# Patient Record
Sex: MALE | Race: WHITE | Employment: FULL TIME | URBAN - METROPOLITAN AREA
[De-identification: names, ages, dates, MRNs, and addresses within clinical notes are randomized per-mention and may not be internally consistent; named-entity substitution may affect disease eponyms.]

---

## 2018-07-05 ENCOUNTER — HOSPITAL ENCOUNTER (EMERGENCY)
Age: 33
Discharge: HOME | End: 2018-07-05
Attending: EMERGENCY MEDICINE

## 2018-07-05 VITALS
RESPIRATION RATE: 16 BRPM | DIASTOLIC BLOOD PRESSURE: 112 MMHG | HEART RATE: 86 BPM | BODY MASS INDEX: 31.15 KG/M2 | TEMPERATURE: 98.1 F | OXYGEN SATURATION: 99 % | SYSTOLIC BLOOD PRESSURE: 182 MMHG | WEIGHT: 230 LBS | HEIGHT: 72 IN

## 2018-07-05 DIAGNOSIS — H11.89 CONJUNCTIVAL IRRITATION: Primary | ICD-10-CM

## 2018-07-05 PROCEDURE — 99282 EMERGENCY DEPT VISIT SF MDM: CPT

## 2018-07-05 RX ORDER — TOBRAMYCIN 3 MG/ML
1 SOLUTION/ DROPS OPHTHALMIC EVERY 6 HOURS
Qty: 1 BOTTLE | Refills: 0 | Status: SHIPPED | OUTPATIENT
Start: 2018-07-05 | End: 2018-07-15

## 2018-07-05 ASSESSMENT — ENCOUNTER SYMPTOMS
SINUS PRESSURE: 0
SORE THROAT: 0
ABDOMINAL PAIN: 0
EYE PAIN: 0
COUGH: 0
NAUSEA: 0
WHEEZING: 0
EYE REDNESS: 1
PERI-ORBITAL EDEMA: 1
DIARRHEA: 0
SHORTNESS OF BREATH: 0
VOMITING: 0
EYE ITCHING: 1
BACK PAIN: 0
EYE DISCHARGE: 0

## 2018-07-05 NOTE — ED PROVIDER NOTES
The history is provided by the patient. Eye Problem   Location:  Both eyes  Quality:  Aching  Severity:  Mild  Onset quality:  Gradual  Duration:  4 days  Chronicity:  New  Associated symptoms: itching, redness and swelling    Associated symptoms: no discharge, no headaches, no nausea, no vomiting and no weakness        Review of Systems   Constitutional: Negative for chills and fever. HENT: Negative for ear pain, sinus pressure and sore throat. Eyes: Positive for redness and itching. Negative for pain and discharge. Respiratory: Negative for cough, shortness of breath and wheezing. Cardiovascular: Negative for chest pain. Gastrointestinal: Negative for abdominal pain, diarrhea, nausea and vomiting. Genitourinary: Negative for dysuria and frequency. Musculoskeletal: Negative for arthralgias and back pain. Skin: Negative for rash and wound. Neurological: Negative for weakness and headaches. Hematological: Negative for adenopathy. All other systems reviewed and are negative. Physical Exam   Constitutional: He is oriented to person, place, and time. He appears well-developed and well-nourished. HENT:   Head: Normocephalic and atraumatic. Eyes: Pupils are equal, round, and reactive to light. Right conjunctiva is injected. Left conjunctiva is injected. Neck: Normal range of motion. Neck supple. Cardiovascular: Normal rate, regular rhythm and normal heart sounds. No murmur heard. Pulmonary/Chest: Effort normal and breath sounds normal. No respiratory distress. He has no wheezes. He has no rales. Abdominal: Soft. Bowel sounds are normal. There is no tenderness. There is no rebound and no guarding. Musculoskeletal: He exhibits no edema. Neurological: He is alert and oriented to person, place, and time. No cranial nerve deficit. Coordination normal.   Skin: Skin is warm and dry. Nursing note and vitals reviewed.       Procedures    MDM evidence of an acute process requiring hospitalization or inpatient management. They have remained hemodynamically stable throughout their entire ED visit and are stable for discharge with outpatient follow-up. The plan has been discussed in detail and they are aware of the specific conditions for emergent return, as well as the importance of follow-up. New Prescriptions    TOBRAMYCIN (TOBREX) 0.3 % OPHTHALMIC SOLUTION    Place 1 drop into both eyes every 6 hours for 10 days       Diagnosis:  1. Conjunctival irritation        Disposition:  Patient's disposition: Discharge to home  Patient's condition is stable.          Octavio Manning MD  07/05/18 1125

## 2018-11-20 ENCOUNTER — HOSPITAL ENCOUNTER (EMERGENCY)
Age: 33
Discharge: HOME | End: 2018-11-20
Attending: EMERGENCY MEDICINE
Payer: COMMERCIAL

## 2018-11-20 VITALS
BODY MASS INDEX: 35.21 KG/M2 | SYSTOLIC BLOOD PRESSURE: 155 MMHG | DIASTOLIC BLOOD PRESSURE: 90 MMHG | OXYGEN SATURATION: 98 % | TEMPERATURE: 99.2 F | RESPIRATION RATE: 16 BRPM | HEIGHT: 72 IN | WEIGHT: 260 LBS | HEART RATE: 90 BPM

## 2018-11-20 DIAGNOSIS — Z48.89 ENCOUNTER FOR POSTOPERATIVE WOUND CHECK: Primary | ICD-10-CM

## 2018-11-20 PROCEDURE — G0381 LEV 2 HOSP TYPE B ED VISIT: HCPCS

## 2018-11-20 PROCEDURE — 99282 EMERGENCY DEPT VISIT SF MDM: CPT

## 2018-11-20 ASSESSMENT — ENCOUNTER SYMPTOMS
SHORTNESS OF BREATH: 0
VOMITING: 0
SINUS PRESSURE: 0
NAUSEA: 0
CONSTIPATION: 0
RHINORRHEA: 0
ABDOMINAL PAIN: 0
SORE THROAT: 0
SINUS PAIN: 0
BACK PAIN: 0
DIARRHEA: 0
WHEEZING: 0
COUGH: 0

## 2018-11-20 ASSESSMENT — PAIN DESCRIPTION - FREQUENCY: FREQUENCY: CONTINUOUS

## 2018-11-20 ASSESSMENT — PAIN DESCRIPTION - PAIN TYPE: TYPE: ACUTE PAIN

## 2018-11-20 ASSESSMENT — PAIN SCALES - GENERAL: PAINLEVEL_OUTOF10: 7

## 2018-11-20 ASSESSMENT — PAIN DESCRIPTION - DESCRIPTORS: DESCRIPTORS: ACHING

## 2018-11-20 ASSESSMENT — PAIN DESCRIPTION - ORIENTATION: ORIENTATION: LEFT

## 2018-11-20 ASSESSMENT — PAIN DESCRIPTION - LOCATION: LOCATION: ARM

## 2018-11-20 ASSESSMENT — PAIN DESCRIPTION - PROGRESSION: CLINICAL_PROGRESSION: NOT CHANGED

## 2018-11-20 ASSESSMENT — PAIN DESCRIPTION - ONSET: ONSET: ON-GOING

## 2018-11-20 NOTE — ED PROVIDER NOTES
Patient is a 44-year-old male who presents with chief complaint of wound check. Patient states that he was just discharged from the hospital 5 days ago after surgical debridement of an abscess. Patient does have a left antecubital fossa wound is not having any drainage, swelling, or erythema. The patient states that he does not have any money and ran out oh dressings for his wound. The patient denies any fevers or chills. He is on oral antibiotics that were prescribed by infectious disease. He is following with him closely as an outpatient. The patient denies any history of IV drug abuse. The history is provided by the patient. No  was used. Review of Systems   Constitutional: Negative for chills, fatigue and fever. HENT: Negative for congestion, rhinorrhea, sinus pain, sinus pressure, sneezing and sore throat. Respiratory: Negative for cough, shortness of breath and wheezing. Cardiovascular: Negative for chest pain and palpitations. Gastrointestinal: Negative for abdominal pain, constipation, diarrhea, nausea and vomiting. Genitourinary: Negative for dysuria, frequency and hematuria. Musculoskeletal: Negative for back pain, neck pain and neck stiffness. Skin: Positive for wound. Negative for rash. Neurological: Negative for dizziness, light-headedness and headaches. Psychiatric/Behavioral: Negative for agitation, behavioral problems and confusion. Physical Exam   Constitutional: He is oriented to person, place, and time. He appears well-developed and well-nourished. No distress. HENT:   Head: Normocephalic and atraumatic. Mouth/Throat: Oropharynx is clear and moist.   Eyes: Conjunctivae are normal. Right eye exhibits no discharge. Left eye exhibits no discharge. No scleral icterus. Neck: Normal range of motion. Neck supple. Cardiovascular: Normal rate and regular rhythm.     Pulmonary/Chest: Effort normal and breath sounds normal. No respiratory

## 2019-09-23 ENCOUNTER — HOSPITAL ENCOUNTER (EMERGENCY)
Age: 34
Discharge: HOME | End: 2019-09-23
Payer: COMMERCIAL

## 2019-09-23 VITALS
HEART RATE: 96 BPM | TEMPERATURE: 98.6 F | WEIGHT: 300 LBS | RESPIRATION RATE: 16 BRPM | OXYGEN SATURATION: 98 % | BODY MASS INDEX: 40.63 KG/M2 | HEIGHT: 72 IN | SYSTOLIC BLOOD PRESSURE: 189 MMHG | DIASTOLIC BLOOD PRESSURE: 96 MMHG

## 2019-09-23 DIAGNOSIS — I10 HYPERTENSION, UNSPECIFIED TYPE: ICD-10-CM

## 2019-09-23 DIAGNOSIS — H66.90 ACUTE OTITIS MEDIA, UNSPECIFIED OTITIS MEDIA TYPE: Primary | ICD-10-CM

## 2019-09-23 PROCEDURE — 99282 EMERGENCY DEPT VISIT SF MDM: CPT

## 2019-09-23 PROCEDURE — 6370000000 HC RX 637 (ALT 250 FOR IP): Performed by: NURSE PRACTITIONER

## 2019-09-23 RX ORDER — AMOXICILLIN 500 MG/1
500 CAPSULE ORAL 2 TIMES DAILY
Qty: 14 CAPSULE | Refills: 0 | Status: SHIPPED | OUTPATIENT
Start: 2019-09-23 | End: 2019-09-30

## 2019-09-23 RX ORDER — LORATADINE 10 MG/1
10 TABLET ORAL DAILY
Qty: 30 TABLET | Refills: 0 | Status: SHIPPED | OUTPATIENT
Start: 2019-09-23 | End: 2019-10-23

## 2019-09-23 RX ORDER — AMOXICILLIN 250 MG/1
500 CAPSULE ORAL ONCE
Status: COMPLETED | OUTPATIENT
Start: 2019-09-23 | End: 2019-09-23

## 2019-09-23 RX ADMIN — AMOXICILLIN 500 MG: 250 CAPSULE ORAL at 19:40

## 2019-09-23 ASSESSMENT — PAIN DESCRIPTION - PAIN TYPE: TYPE: ACUTE PAIN

## 2019-09-23 ASSESSMENT — PAIN SCALES - GENERAL: PAINLEVEL_OUTOF10: 7

## 2019-09-23 ASSESSMENT — PAIN DESCRIPTION - LOCATION: LOCATION: EAR

## 2019-09-23 ASSESSMENT — PAIN DESCRIPTION - ORIENTATION: ORIENTATION: LEFT;RIGHT

## 2019-09-24 ENCOUNTER — TELEPHONE (OUTPATIENT)
Dept: ADMINISTRATIVE | Age: 34
End: 2019-09-24

## 2019-09-24 NOTE — TELEPHONE ENCOUNTER
Patient was seen in the ED yesterday for Acute otitis media. He is a community corrections resident and is not able to leave the area. He is referred to follow up with the [address] [facility] but due to his restrictions he must see someone in [facility]. I did not have any openings and was not able to reach anyone in the office. Please call him back at [facility]. The call back number is [phone]. Thank you.

## 2019-09-24 NOTE — ED PROVIDER NOTES
99 16 94 % 6' (1.829 m) 300 lb (136.1 kg)      Oxygen Saturation Interpretation: Normal.    Constitutional:  Alert, development consistent with age. Ears:  External Ears: normal.                 TM's & External Canals:  abnormal external canal left ear - erythematous and abnormal TM left ear - dull. Nose:   There is no abnormalities present  Throat:  Pharynx without injection, exudate, or tonsillar hypertrophy. Airway patient. Neck:  Normal ROM. Supple. Respiratory:  Clear to auscultation and breath sounds equal.    CV: Regular rate and rhythm, normal heart sounds, without pathological murmurs, ectopy, gallops, or rubs. Skin:  No rashes, erythema present, unless noted elsewhere. Lymphatic: No lymphangitis or adenopathy noted. Neurological:  Oriented. Motor functions intact. Lab / Imaging Results   (All laboratory and radiology results have been personally reviewed by myself)  Labs:  No results found for this visit on 09/23/19. Imaging: All Radiology results interpreted by Radiologist unless otherwise noted. No orders to display     ED Course / Medical Decision Making     Medications   amoxicillin (AMOXIL) capsule 500 mg (500 mg Oral Given 9/23/19 1940)        Consult(s):   None    Procedure(s):   none    MDM:   Joy Zamora is a 36year old male who presents the emergency department for complaint of left earache for the past 3 weeks. He reports that his PCP told him that he had fluid behind the ear several days ago. Reports that he is under the care of ENT and has had to have tubes placed in the past.  Nontoxic. Is presently residing at [facility] and reports for that reason he has had difficulty getting follow-up with his ENT. Left TM is dull with noted erythema. Has not been on any recent antibiotics. Prescribed amoxicillin. Instructed to follow-up with ENT and advised to return for any new, changing, worsening symptoms or concerns. His blood pressure was noted to be elevated while he was in the ED. was instructed to follow-up with his PCP for blood pressure reevaluation/check. At this time the patient is without objective evidence of an acute process requiring hospitalization or inpatient management. They have remained hemodynamically stable throughout their entire ED visit and are stable for discharge with outpatient follow-up. The plan has been discussed in detail and they are aware of the specific conditions for emergent return, as well as the importance of follow-up. Counseling: The emergency provider has spoken with the patient and discussed todays results, in addition to providing specific details for the plan of care and counseling regarding the diagnosis and prognosis. Questions are answered at this time and they are agreeable with the plan. Assessment      1. Acute otitis media, unspecified otitis media type    2. Hypertension, unspecified type      Plan   Discharge to home  Patient condition is good    New Medications     Discharge Medication List as of 9/23/2019  7:44 PM      START taking these medications    Details   amoxicillin (AMOXIL) 500 MG capsule Take 1 capsule by mouth 2 times daily for 7 days, Disp-14 capsule, R-0Print      loratadine (CLARITIN) 10 MG tablet Take 1 tablet by mouth daily, Disp-30 tablet, R-0Print           Electronically signed by FADIA Tyler CNP   DD: 9/23/19  **This report was transcribed using voice recognition software. Every effort was made to ensure accuracy; however, inadvertent computerized transcription errors may be present.   END OF ED PROVIDER NOTE     FADIA Tyler CNP  09/23/19 5829

## 2021-01-13 ENCOUNTER — APPOINTMENT (OUTPATIENT)
Dept: GENERAL RADIOLOGY | Age: 36
End: 2021-01-13
Payer: MEDICAID

## 2021-01-13 ENCOUNTER — APPOINTMENT (OUTPATIENT)
Dept: ULTRASOUND IMAGING | Age: 36
End: 2021-01-13
Payer: MEDICAID

## 2021-01-13 ENCOUNTER — HOSPITAL ENCOUNTER (EMERGENCY)
Age: 36
Discharge: HOME | End: 2021-01-13
Attending: EMERGENCY MEDICINE
Payer: MEDICAID

## 2021-01-13 VITALS
WEIGHT: 315 LBS | DIASTOLIC BLOOD PRESSURE: 79 MMHG | TEMPERATURE: 97.8 F | BODY MASS INDEX: 44.1 KG/M2 | HEART RATE: 86 BPM | HEIGHT: 71 IN | SYSTOLIC BLOOD PRESSURE: 118 MMHG | RESPIRATION RATE: 16 BRPM | OXYGEN SATURATION: 95 %

## 2021-01-13 DIAGNOSIS — R11.2 NON-INTRACTABLE VOMITING WITH NAUSEA, UNSPECIFIED VOMITING TYPE: ICD-10-CM

## 2021-01-13 DIAGNOSIS — E86.0 DEHYDRATION: Primary | ICD-10-CM

## 2021-01-13 LAB
ALBUMIN SERPL-MCNC: 4.3 G/DL (ref 3.5–5.2)
ALP BLD-CCNC: 107 U/L (ref 40–129)
ALT SERPL-CCNC: 103 U/L (ref 0–40)
AMPHETAMINE SCREEN, URINE: NOT DETECTED
ANION GAP SERPL CALCULATED.3IONS-SCNC: 13 MMOL/L (ref 7–16)
AST SERPL-CCNC: 49 U/L (ref 0–39)
BARBITURATE SCREEN URINE: NOT DETECTED
BASOPHILS ABSOLUTE: 0.03 E9/L (ref 0–0.2)
BASOPHILS RELATIVE PERCENT: 0.3 % (ref 0–2)
BENZODIAZEPINE SCREEN, URINE: NOT DETECTED
BILIRUB SERPL-MCNC: 0.4 MG/DL (ref 0–1.2)
BILIRUBIN URINE: NEGATIVE
BLOOD, URINE: NEGATIVE
BUN BLDV-MCNC: 23 MG/DL (ref 6–20)
CALCIUM SERPL-MCNC: 9.7 MG/DL (ref 8.6–10.2)
CANNABINOID SCREEN URINE: POSITIVE
CHLORIDE BLD-SCNC: 98 MMOL/L (ref 98–107)
CLARITY: CLEAR
CO2: 25 MMOL/L (ref 22–29)
COCAINE METABOLITE SCREEN URINE: NOT DETECTED
COLOR: YELLOW
CREAT SERPL-MCNC: 1.3 MG/DL (ref 0.7–1.2)
EKG ATRIAL RATE: 69 BPM
EKG P AXIS: 21 DEGREES
EKG P-R INTERVAL: 162 MS
EKG Q-T INTERVAL: 384 MS
EKG QRS DURATION: 86 MS
EKG QTC CALCULATION (BAZETT): 411 MS
EKG R AXIS: 64 DEGREES
EKG T AXIS: 29 DEGREES
EKG VENTRICULAR RATE: 69 BPM
EOSINOPHILS ABSOLUTE: 0.27 E9/L (ref 0.05–0.5)
EOSINOPHILS RELATIVE PERCENT: 3 % (ref 0–6)
FENTANYL SCREEN, URINE: NOT DETECTED
GFR AFRICAN AMERICAN: >60
GFR NON-AFRICAN AMERICAN: >60 ML/MIN/1.73
GLUCOSE BLD-MCNC: 105 MG/DL (ref 74–99)
GLUCOSE URINE: NEGATIVE MG/DL
HCT VFR BLD CALC: 50.2 % (ref 37–54)
HEMOGLOBIN: 16.6 G/DL (ref 12.5–16.5)
IMMATURE GRANULOCYTES #: 0.04 E9/L
IMMATURE GRANULOCYTES %: 0.5 % (ref 0–5)
KETONES, URINE: NEGATIVE MG/DL
LEUKOCYTE ESTERASE, URINE: NEGATIVE
LIPASE: 20 U/L (ref 13–60)
LYMPHOCYTES ABSOLUTE: 2.71 E9/L (ref 1.5–4)
LYMPHOCYTES RELATIVE PERCENT: 30.6 % (ref 20–42)
Lab: ABNORMAL
MCH RBC QN AUTO: 28.7 PG (ref 26–35)
MCHC RBC AUTO-ENTMCNC: 33.1 % (ref 32–34.5)
MCV RBC AUTO: 86.7 FL (ref 80–99.9)
METHADONE SCREEN, URINE: NOT DETECTED
MONOCYTES ABSOLUTE: 0.82 E9/L (ref 0.1–0.95)
MONOCYTES RELATIVE PERCENT: 9.2 % (ref 2–12)
NEUTROPHILS ABSOLUTE: 5 E9/L (ref 1.8–7.3)
NEUTROPHILS RELATIVE PERCENT: 56.4 % (ref 43–80)
NITRITE, URINE: NEGATIVE
OPIATE SCREEN URINE: NOT DETECTED
OXYCODONE URINE: NOT DETECTED
PDW BLD-RTO: 12.4 FL (ref 11.5–15)
PH UA: 6 (ref 5–9)
PHENCYCLIDINE SCREEN URINE: NOT DETECTED
PLATELET # BLD: 297 E9/L (ref 130–450)
PMV BLD AUTO: 8.8 FL (ref 7–12)
POTASSIUM REFLEX MAGNESIUM: 4.5 MMOL/L (ref 3.5–5)
PROTEIN UA: NEGATIVE MG/DL
RBC # BLD: 5.79 E12/L (ref 3.8–5.8)
SODIUM BLD-SCNC: 136 MMOL/L (ref 132–146)
SPECIFIC GRAVITY UA: 1.02 (ref 1–1.03)
TOTAL PROTEIN: 8 G/DL (ref 6.4–8.3)
TROPONIN: <0.01 NG/ML (ref 0–0.03)
UROBILINOGEN, URINE: 0.2 E.U./DL
WBC # BLD: 8.9 E9/L (ref 4.5–11.5)

## 2021-01-13 PROCEDURE — 84484 ASSAY OF TROPONIN QUANT: CPT

## 2021-01-13 PROCEDURE — 81003 URINALYSIS AUTO W/O SCOPE: CPT

## 2021-01-13 PROCEDURE — 71045 X-RAY EXAM CHEST 1 VIEW: CPT

## 2021-01-13 PROCEDURE — 85025 COMPLETE CBC W/AUTO DIFF WBC: CPT

## 2021-01-13 PROCEDURE — 36415 COLL VENOUS BLD VENIPUNCTURE: CPT

## 2021-01-13 PROCEDURE — 83690 ASSAY OF LIPASE: CPT

## 2021-01-13 PROCEDURE — 76705 ECHO EXAM OF ABDOMEN: CPT

## 2021-01-13 PROCEDURE — 99283 EMERGENCY DEPT VISIT LOW MDM: CPT

## 2021-01-13 PROCEDURE — 80053 COMPREHEN METABOLIC PANEL: CPT

## 2021-01-13 PROCEDURE — 96361 HYDRATE IV INFUSION ADD-ON: CPT

## 2021-01-13 PROCEDURE — 80307 DRUG TEST PRSMV CHEM ANLYZR: CPT

## 2021-01-13 PROCEDURE — 2580000003 HC RX 258: Performed by: EMERGENCY MEDICINE

## 2021-01-13 PROCEDURE — 6370000000 HC RX 637 (ALT 250 FOR IP): Performed by: STUDENT IN AN ORGANIZED HEALTH CARE EDUCATION/TRAINING PROGRAM

## 2021-01-13 PROCEDURE — 96374 THER/PROPH/DIAG INJ IV PUSH: CPT

## 2021-01-13 PROCEDURE — 93005 ELECTROCARDIOGRAM TRACING: CPT | Performed by: STUDENT IN AN ORGANIZED HEALTH CARE EDUCATION/TRAINING PROGRAM

## 2021-01-13 PROCEDURE — 6360000002 HC RX W HCPCS: Performed by: STUDENT IN AN ORGANIZED HEALTH CARE EDUCATION/TRAINING PROGRAM

## 2021-01-13 RX ORDER — ONDANSETRON 4 MG/1
4 TABLET, ORALLY DISINTEGRATING ORAL EVERY 12 HOURS PRN
Qty: 4 TABLET | Refills: 0 | Status: SHIPPED | OUTPATIENT
Start: 2021-01-13 | End: 2021-01-13 | Stop reason: SDUPTHER

## 2021-01-13 RX ORDER — ONDANSETRON 2 MG/ML
4 INJECTION INTRAMUSCULAR; INTRAVENOUS ONCE
Status: COMPLETED | OUTPATIENT
Start: 2021-01-13 | End: 2021-01-13

## 2021-01-13 RX ORDER — 0.9 % SODIUM CHLORIDE 0.9 %
1000 INTRAVENOUS SOLUTION INTRAVENOUS ONCE
Status: COMPLETED | OUTPATIENT
Start: 2021-01-13 | End: 2021-01-13

## 2021-01-13 RX ORDER — ONDANSETRON 4 MG/1
4 TABLET, ORALLY DISINTEGRATING ORAL EVERY 12 HOURS PRN
Qty: 4 TABLET | Refills: 0 | Status: SHIPPED | OUTPATIENT
Start: 2021-01-13 | End: 2021-01-15

## 2021-01-13 RX ADMIN — LIDOCAINE HYDROCHLORIDE: 20 SOLUTION ORAL; TOPICAL at 13:35

## 2021-01-13 RX ADMIN — SODIUM CHLORIDE 1000 ML: 9 INJECTION, SOLUTION INTRAVENOUS at 15:43

## 2021-01-13 RX ADMIN — ONDANSETRON 4 MG: 2 INJECTION INTRAMUSCULAR; INTRAVENOUS at 13:37

## 2021-01-13 RX ADMIN — SODIUM CHLORIDE 1000 ML: 9 INJECTION, SOLUTION INTRAVENOUS at 13:36

## 2021-01-13 ASSESSMENT — PAIN DESCRIPTION - LOCATION: LOCATION: ABDOMEN

## 2021-01-13 ASSESSMENT — ENCOUNTER SYMPTOMS
WHEEZING: 0
COLOR CHANGE: 0
CONSTIPATION: 0
BACK PAIN: 0
SHORTNESS OF BREATH: 0
NAUSEA: 1
RECTAL PAIN: 0
VOICE CHANGE: 0
COUGH: 0
ABDOMINAL PAIN: 1
VOMITING: 1
DIARRHEA: 0
TROUBLE SWALLOWING: 0

## 2021-01-13 ASSESSMENT — PAIN DESCRIPTION - PAIN TYPE: TYPE: ACUTE PAIN;CHRONIC PAIN

## 2021-01-13 NOTE — ED PROVIDER NOTES
[address]      Pt Name: Shaheen Santos  MRN: [id number]  [place] 1985  Date of evaluation: 1/13/2021      CHIEF COMPLAINT       Chief Complaint   Patient presents with    Abdominal Pain    Nausea    Emesis     x several weeks, unable to keep liquids or solids down.  Fatigue    Dizziness        HPI  Shaheen Santos is a 30 y.o. male with pertinent past medical history of GERD without esophagitis, presents with complaints of nausea, emesis, fatigue, dizziness, and abdominal pain for 2 weeks. Patient describes his abdominal pain is epigastric, gnawing, aching, moderate in severity. Worsened with eating. No alleviating factors. Admits to nausea, vomiting, fatigue, and dizziness. Patient taken Zofran with only minor relief of his symptoms. Patient also admits to smoking 1-1/2 g of medical grade marijuana daily. States that it is not helped his abdominal pain or his nausea. Denies any fevers, chills, chest pain, shortness of breath, flank pain, dysuria, hematuria, diarrhea, constipation, new rashes or sores. Except as noted above the remainder of the review of systems was reviewed and negative. Review of Systems   Constitutional: Positive for appetite change. Negative for chills, diaphoresis, fatigue, fever and unexpected weight change. HENT: Negative for trouble swallowing and voice change. Eyes: Negative for visual disturbance. Respiratory: Negative for cough, shortness of breath and wheezing. Cardiovascular: Negative for chest pain. Gastrointestinal: Positive for abdominal pain, nausea and vomiting. Negative for constipation, diarrhea and rectal pain. Endocrine: Negative for polyphagia and polyuria. Genitourinary: Negative for dysuria and frequency. Musculoskeletal: Negative for arthralgias and back pain. Skin: Negative for color change, pallor, rash and wound.    Neurological: Negative for for \"weeks. \"  Vitals within normal limits. On physical exam patient is in no acute distress, speaking full sentences, alert and oriented x3. Lungs are clear to auscultation bilaterally. No wheeze rales rhonchi appreciated. Normal S1-S2. Abnormality at the epigastrium. Mild right upper quadrant abdominal tenderness. Negative Olivas sign. No tenderness McBurney's point. Positive bowel sounds. No bilateral leg edema. Diagnostic labs remarkable. Electrolytes within normal limits. Patient has a mildly elevated creatinine of 1.3. Right upper quadrant ultrasound negative for acute process. Possibly elevated due to dehydration. Patient was given IV fluids in the department and given Zofran. He was p.o. challenged. Tolerated it well. Patient's diagnosis of his abdominal pain epigastric. And nonintractable nausea and vomiting. I spoke with the patient to try to come to the decreased amount of marijuana he smokes. And possibly that this could be the cause of his symptoms. Patient has a follow-up with a GI specialist on 24th of this month. He is agreeable plan for discharge. Amount and/or Complexity of Data Reviewed  Decide to obtain previous medical records or to obtain history from someone other than the patient: yes           Patient is awake alert, hemodynamic stable, afebrile and in no respiratory distress. Discussed with patient plan for close outpatient follow-up with the patient's PCP as well as return precautions and the patient understands and agrees to the plan. ED Course as of Jan 14 0255 Wed Jan 13, 2021   1314 On chart review patient does not have any medications on file.     [JV]   1316 HTN on lisinopril, Hcl, clonazapam, zolotipam, escitalopram, hydroyzine    New physician used to see Dr. Miranda Corley      [JV]   [place]:     I have personally performed and/or participated in the history, exam, medical decision making, and rhinitis, Chronic back pain, and Depression. Past Surgical History:  has a past surgical history that includes Abscess Drainage. Social History:  reports that he has quit smoking. His smoking use included cigarettes. He has a 30.00 pack-year smoking history. He has never used smokeless tobacco. He reports current alcohol use. He reports that he does not use drugs. Family History: family history is not on file. The patients home medications have been reviewed. Allergies: Patient has no known allergies.     -------------------------------------------------- RESULTS -------------------------------------------------  Labs:  Results for orders placed or performed during the hospital encounter of 01/13/21   CBC Auto Differential   Result Value Ref Range    WBC 8.9 4.5 - 11.5 E9/L    RBC 5.79 3.80 - 5.80 E12/L    Hemoglobin 16.6 (H) 12.5 - 16.5 g/dL    Hematocrit 50.2 37.0 - 54.0 %    MCV 86.7 80.0 - 99.9 fL    MCH 28.7 26.0 - 35.0 pg    MCHC 33.1 32.0 - 34.5 %    RDW 12.4 11.5 - 15.0 fL    Platelets 796 624 - 810 E9/L    MPV 8.8 7.0 - 12.0 fL    Neutrophils % 56.4 43.0 - 80.0 %    Immature Granulocytes % 0.5 0.0 - 5.0 %    Lymphocytes % 30.6 20.0 - 42.0 %    Monocytes % 9.2 2.0 - 12.0 %    Eosinophils % 3.0 0.0 - 6.0 %    Basophils % 0.3 0.0 - 2.0 %    Neutrophils Absolute 5.00 1.80 - 7.30 E9/L    Immature Granulocytes # 0.04 E9/L    Lymphocytes Absolute 2.71 1.50 - 4.00 E9/L    Monocytes Absolute 0.82 0.10 - 0.95 E9/L    Eosinophils Absolute 0.27 0.05 - 0.50 E9/L    Basophils Absolute 0.03 0.00 - 0.20 E9/L   Comprehensive Metabolic Panel w/ Reflex to MG   Result Value Ref Range    Sodium 136 132 - 146 mmol/L    Potassium reflex Magnesium 4.5 3.5 - 5.0 mmol/L    Chloride 98 98 - 107 mmol/L    CO2 25 22 - 29 mmol/L    Anion Gap 13 7 - 16 mmol/L    Glucose 105 (H) 74 - 99 mg/dL    BUN 23 (H) 6 - 20 mg/dL    CREATININE 1.3 (H) 0.7 - 1.2 mg/dL    GFR Non-African American >60 >=60 mL/min/1.73    GFR African American >60     Calcium 9.7 8.6 - 10.2 mg/dL    Total Protein 8.0 6.4 - 8.3 g/dL    Alb 4.3 3.5 - 5.2 g/dL    Total Bilirubin 0.4 0.0 - 1.2 mg/dL    Alkaline Phosphatase 107 40 - 129 U/L     (H) 0 - 40 U/L    AST 49 (H) 0 - 39 U/L   Lipase   Result Value Ref Range    Lipase 20 13 - 60 U/L   Troponin   Result Value Ref Range    Troponin <0.01 0.00 - 0.03 ng/mL   Urinalysis   Result Value Ref Range    Color, UA Yellow Straw/Yellow    Clarity, UA Clear Clear    Glucose, Ur Negative Negative mg/dL    Bilirubin Urine Negative Negative    Ketones, Urine Negative Negative mg/dL    Specific Gravity, UA 1.020 1.005 - 1.030    Blood, Urine Negative Negative    pH, UA 6.0 5.0 - 9.0    Protein, UA Negative Negative mg/dL    Urobilinogen, Urine 0.2 <2.0 E.U./dL    Nitrite, Urine Negative Negative    Leukocyte Esterase, Urine Negative Negative   Urine Drug Screen   Result Value Ref Range    Amphetamine Screen, Urine NOT DETECTED Negative <1000 ng/mL    Barbiturate Screen, Ur NOT DETECTED Negative < 200 ng/mL    Benzodiazepine Screen, Urine NOT DETECTED Negative < 200 ng/mL    Cannabinoid Scrn, Ur POSITIVE (A) Negative < 50ng/mL    Cocaine Metabolite Screen, Urine NOT DETECTED Negative < 300 ng/mL    Opiate Scrn, Ur NOT DETECTED Negative < 300ng/mL    PCP Screen, Urine NOT DETECTED Negative < 25 ng/mL    Methadone Screen, Urine NOT DETECTED Negative <300 ng/mL    Oxycodone Urine NOT DETECTED Negative <100 ng/mL    FENTANYL SCREEN, URINE NOT DETECTED Negative <1 ng/mL    Drug Screen Comment: see below    EKG 12 Lead   Result Value Ref Range    Ventricular Rate 69 BPM    Atrial Rate 69 BPM    P-R Interval 162 ms    QRS Duration 86 ms    Q-T Interval 384 ms    QTc Calculation (Bazett) 411 ms    P Axis 21 degrees    R Axis 64 degrees    T Axis 29 degrees     EKG read by me. Heart rate 69. Normal sinus rhythm. Normal axis deviation. No QTC prolongation. No ST elevations or depressions.   Stable as compared to previous EKG on 1/10/2017. Radiology:  XR CHEST 1 VIEW   Final Result   No pneumonia or pleural effusion. US ABDOMEN LIMITED   Final Result   Unremarkable right upper quadrant ultrasound. Please note that there is limited visualization of the pancreas. ------------------------- NURSING NOTES AND VITALS REVIEWED ---------------------------  Date / Time Roomed:  1/13/2021 12:57 PM  ED Bed Assignment:  23/23    The nursing notes within the ED encounter and vital signs as below have been reviewed. /79   Pulse 86   Temp 97.8 °F (36.6 °C) (Oral)   Resp 16   Ht 5' 11\" (1.803 m)   Wt (!) 343 lb 1 oz (155.6 kg)   SpO2 95%   BMI 47.85 kg/m²   Oxygen Saturation Interpretation: normal      ------------------------------------------ PROGRESS NOTES ------------------------------------------    I have spoken with the patient and discussed todays results, in addition to providing specific details for the plan of care and counseling regarding the diagnosis and prognosis. Their questions are answered at this time and they are agreeable with the plan. I discussed at length with them reasons for immediate return here for re evaluation. They will followup with their PCP by calling their office tomorrow. --------------------------------- ADDITIONAL PROVIDER NOTES ---------------------------------  At this time the patient is without objective evidence of an acute process requiring hospitalization or inpatient management. They have remained hemodynamically stable throughout their entire ED visit and are stable for discharge with outpatient follow-up. The plan has been discussed in detail and they are aware of the specific conditions for emergent return, as well as the importance of follow-up.       Discharge Medication List as of 1/13/2021  4:20 PM      START taking these medications    Details   ondansetron (ZOFRAN ODT) 4 MG disintegrating tablet Take 1 tablet by mouth every 12 hours as needed for Nausea

## 2021-01-19 ENCOUNTER — OFFICE VISIT (OUTPATIENT)
Dept: FAMILY MEDICINE CLINIC | Age: 36
End: 2021-01-19
Payer: MEDICAID

## 2021-01-19 VITALS
RESPIRATION RATE: 16 BRPM | SYSTOLIC BLOOD PRESSURE: 92 MMHG | TEMPERATURE: 97.8 F | WEIGHT: 315 LBS | HEART RATE: 81 BPM | HEIGHT: 71 IN | OXYGEN SATURATION: 95 % | BODY MASS INDEX: 44.1 KG/M2 | DIASTOLIC BLOOD PRESSURE: 59 MMHG

## 2021-01-19 DIAGNOSIS — R10.11 RIGHT UPPER QUADRANT ABDOMINAL PAIN: Primary | ICD-10-CM

## 2021-01-19 DIAGNOSIS — Z72.0 TOBACCO ABUSE: ICD-10-CM

## 2021-01-19 DIAGNOSIS — Z13.31 POSITIVE DEPRESSION SCREENING: ICD-10-CM

## 2021-01-19 DIAGNOSIS — E66.01 CLASS 3 SEVERE OBESITY DUE TO EXCESS CALORIES WITH BODY MASS INDEX (BMI) OF 45.0 TO 49.9 IN ADULT, UNSPECIFIED WHETHER SERIOUS COMORBIDITY PRESENT (HCC): ICD-10-CM

## 2021-01-19 DIAGNOSIS — I10 ESSENTIAL HYPERTENSION: ICD-10-CM

## 2021-01-19 PROCEDURE — G8417 CALC BMI ABV UP PARAM F/U: HCPCS | Performed by: INTERNAL MEDICINE

## 2021-01-19 PROCEDURE — 99213 OFFICE O/P EST LOW 20 MIN: CPT | Performed by: INTERNAL MEDICINE

## 2021-01-19 PROCEDURE — G8427 DOCREV CUR MEDS BY ELIG CLIN: HCPCS | Performed by: INTERNAL MEDICINE

## 2021-01-19 PROCEDURE — G8431 POS CLIN DEPRES SCRN F/U DOC: HCPCS | Performed by: INTERNAL MEDICINE

## 2021-01-19 PROCEDURE — G8484 FLU IMMUNIZE NO ADMIN: HCPCS | Performed by: INTERNAL MEDICINE

## 2021-01-19 PROCEDURE — 4004F PT TOBACCO SCREEN RCVD TLK: CPT | Performed by: INTERNAL MEDICINE

## 2021-01-19 RX ORDER — TESTOSTERONE 20.25 MG/1.25G
GEL TOPICAL
COMMUNITY
Start: 2021-01-15 | End: 2021-09-15

## 2021-01-19 RX ORDER — ESCITALOPRAM OXALATE 20 MG/1
TABLET ORAL
COMMUNITY
Start: 2021-01-05

## 2021-01-19 RX ORDER — AMLODIPINE BESYLATE 10 MG/1
TABLET ORAL
COMMUNITY
Start: 2020-12-01 | End: 2021-01-19 | Stop reason: SDUPTHER

## 2021-01-19 RX ORDER — LISINOPRIL 40 MG/1
TABLET ORAL
COMMUNITY
Start: 2020-12-01 | End: 2021-01-19 | Stop reason: SDUPTHER

## 2021-01-19 RX ORDER — HYDROCHLOROTHIAZIDE 12.5 MG/1
TABLET ORAL
COMMUNITY
Start: 2020-12-01 | End: 2021-01-19 | Stop reason: SDUPTHER

## 2021-01-19 RX ORDER — LISINOPRIL 40 MG/1
TABLET ORAL
Qty: 90 TABLET | Refills: 3 | Status: SHIPPED
Start: 2021-01-19 | End: 2021-09-15

## 2021-01-19 RX ORDER — HYDROXYZINE PAMOATE 50 MG/1
CAPSULE ORAL
COMMUNITY
Start: 2021-01-05

## 2021-01-19 RX ORDER — ZOLPIDEM TARTRATE 5 MG/1
TABLET ORAL
COMMUNITY
Start: 2021-01-08 | End: 2022-01-10

## 2021-01-19 RX ORDER — AMLODIPINE BESYLATE 10 MG/1
TABLET ORAL
Qty: 90 TABLET | Refills: 3 | Status: SHIPPED
Start: 2021-01-19 | End: 2021-09-15

## 2021-01-19 RX ORDER — HYDROCHLOROTHIAZIDE 12.5 MG/1
TABLET ORAL
Qty: 90 TABLET | Refills: 3 | Status: SHIPPED
Start: 2021-01-19 | End: 2021-09-15

## 2021-01-19 SDOH — ECONOMIC STABILITY: TRANSPORTATION INSECURITY
IN THE PAST 12 MONTHS, HAS LACK OF TRANSPORTATION KEPT YOU FROM MEETINGS, WORK, OR FROM GETTING THINGS NEEDED FOR DAILY LIVING?: NO

## 2021-01-19 SDOH — ECONOMIC STABILITY: FOOD INSECURITY: WITHIN THE PAST 12 MONTHS, YOU WORRIED THAT YOUR FOOD WOULD RUN OUT BEFORE YOU GOT MONEY TO BUY MORE.: OFTEN TRUE

## 2021-01-19 SDOH — ECONOMIC STABILITY: FOOD INSECURITY: WITHIN THE PAST 12 MONTHS, THE FOOD YOU BOUGHT JUST DIDN'T LAST AND YOU DIDN'T HAVE MONEY TO GET MORE.: OFTEN TRUE

## 2021-01-19 ASSESSMENT — PATIENT HEALTH QUESTIONNAIRE - PHQ9
3. TROUBLE FALLING OR STAYING ASLEEP: 3
7. TROUBLE CONCENTRATING ON THINGS, SUCH AS READING THE NEWSPAPER OR WATCHING TELEVISION: 3
SUM OF ALL RESPONSES TO PHQ9 QUESTIONS 1 & 2: 3
SUM OF ALL RESPONSES TO PHQ QUESTIONS 1-9: 15
9. THOUGHTS THAT YOU WOULD BE BETTER OFF DEAD, OR OF HURTING YOURSELF: 0
SUM OF ALL RESPONSES TO PHQ QUESTIONS 1-9: 15
1. LITTLE INTEREST OR PLEASURE IN DOING THINGS: 2
2. FEELING DOWN, DEPRESSED OR HOPELESS: 1
6. FEELING BAD ABOUT YOURSELF - OR THAT YOU ARE A FAILURE OR HAVE LET YOURSELF OR YOUR FAMILY DOWN: 0

## 2021-01-19 ASSESSMENT — COLUMBIA-SUICIDE SEVERITY RATING SCALE - C-SSRS: 1. WITHIN THE PAST MONTH, HAVE YOU WISHED YOU WERE DEAD OR WISHED YOU COULD GO TO SLEEP AND NOT WAKE UP?: NO

## 2021-01-19 NOTE — PROGRESS NOTES
1/19/2021    Chief Complaint   Patient presents with   [address] Other     sick to stomach: difficulty to keep food and drink down since December    Medication Refill    Flu Vaccine     pt declined       HPI  C/o GI issues since mid December. Feels nauseous with almost all types of food. Vomits at times. Has learned that if he eats slowly it helps. Review of Systems   Constitutional: Negative for chills, diaphoresis, fever and unexpected weight change. HENT: Negative for sore throat and trouble swallowing. Respiratory: Negative for cough, shortness of breath and wheezing. Cardiovascular: Negative for chest pain and leg swelling. Gastrointestinal: Positive for nausea and vomiting. Negative for abdominal pain, blood in stool, constipation and diarrhea. Genitourinary: Negative for dysuria and hematuria. Past Medical History:   Diagnosis Date    Allergic rhinitis     Chronic back pain     Depression     Hypertension        Social History     Tobacco Use    Smoking status: Current Every Day Smoker     Packs/day: 0.50     Years: 15.00     Pack years: 7.50     Types: Cigarettes     Start date: 0    Smokeless tobacco: Never Used   Substance Use Topics    Alcohol use: Yes     Alcohol/week: 0.0 standard drinks     Comment: occ     Social History     Substance and Sexual Activity   Drug Use No    Comment: medical marijuana       Past surgical, family, social history reviewed and updated. BP (!) 92/59   Pulse 81   Temp 97.8 °F (36.6 °C) (Temporal)   Resp 16   Ht 5' 11\" (1.803 m)   Wt (!) 344 lb 11.2 oz (156.4 kg)   SpO2 95%   BMI 48.08 kg/m²     Physical Exam  Constitutional:       General: He is not in acute distress. Appearance: He is not diaphoretic. Eyes:      General: No scleral icterus. Pulmonary:      Effort: No respiratory distress. Abdominal:      General: Bowel sounds are normal.      Palpations: Abdomen is soft. There is no mass. Tenderness: There is no abdominal tenderness. Skin:     Coloration: Skin is not jaundiced. Neurological:      General: No focal deficit present. Mental Status: He is alert. Cranial Nerves: No cranial nerve deficit. Gait: Gait normal.   Psychiatric:         Thought Content: Thought content normal.       1. Right upper quadrant abdominal pain  Requesting records of recent testing and will advise pt further after reviewing    2. Tobacco abuse  Strongly urged him to quit, he will discuss with radha who is also a smoker    3. Essential hypertension  Stable- continue present management. - lisinopril (PRINIVIL;ZESTRIL) 40 MG tablet; Once daily  Dispense: 90 tablet; Refill: 3  - amLODIPine (NORVASC) 10 MG tablet; Once daily  Dispense: 90 tablet; Refill: 3  - hydroCHLOROthiazide (HYDRODIURIL) 12.5 MG tablet; TK 1 T PO QD  Dispense: 90 tablet; Refill: 3    4. Positive depression screening  Previous dx, stable on Lexapro    5.  Class 3 severe obesity due to excess calories with body mass index (BMI) of 45.0 to 49.9 in adult, unspecified whether serious comorbidity present ([facility] [address]  Counseled briefly on need to improve diet and exercise

## 2021-01-20 PROBLEM — I10 ESSENTIAL HYPERTENSION: Status: ACTIVE | Noted: 2021-01-20

## 2021-01-20 PROBLEM — E66.01 CLASS 3 SEVERE OBESITY DUE TO EXCESS CALORIES WITH BODY MASS INDEX (BMI) OF 45.0 TO 49.9 IN ADULT (HCC): Status: ACTIVE | Noted: 2021-01-20

## 2021-01-20 PROBLEM — E66.813 CLASS 3 SEVERE OBESITY DUE TO EXCESS CALORIES WITH BODY MASS INDEX (BMI) OF 45.0 TO 49.9 IN ADULT: Status: ACTIVE | Noted: 2021-01-20

## 2021-01-20 ASSESSMENT — ENCOUNTER SYMPTOMS
ABDOMINAL PAIN: 0
SORE THROAT: 0
TROUBLE SWALLOWING: 0
VOMITING: 1
DIARRHEA: 0
NAUSEA: 1
BLOOD IN STOOL: 0
CONSTIPATION: 0
COUGH: 0
WHEEZING: 0
SHORTNESS OF BREATH: 0

## 2021-01-26 ENCOUNTER — TELEPHONE (OUTPATIENT)
Dept: FAMILY MEDICINE CLINIC | Age: 36
End: 2021-01-26

## 2021-01-26 DIAGNOSIS — K22.10 ESOPHAGEAL EROSIONS: Primary | ICD-10-CM

## 2021-01-26 NOTE — TELEPHONE ENCOUNTER
Called pt to discuss receipt of EGD findings from 8/21/2016 showing erosive esophagitis. He states that he has just started on Prilosec prescribed by a Dr. Yelitza Harvey who is planning EGD.

## 2021-02-25 ENCOUNTER — OFFICE VISIT (OUTPATIENT)
Dept: FAMILY MEDICINE CLINIC | Age: 36
End: 2021-02-25
Payer: MEDICAID

## 2021-02-25 VITALS
SYSTOLIC BLOOD PRESSURE: 114 MMHG | HEART RATE: 76 BPM | WEIGHT: 315 LBS | BODY MASS INDEX: 44.1 KG/M2 | DIASTOLIC BLOOD PRESSURE: 72 MMHG | HEIGHT: 71 IN | OXYGEN SATURATION: 96 % | TEMPERATURE: 98.7 F

## 2021-02-25 DIAGNOSIS — R73.03 PREDIABETES: ICD-10-CM

## 2021-02-25 DIAGNOSIS — E66.01 CLASS 3 SEVERE OBESITY DUE TO EXCESS CALORIES WITH BODY MASS INDEX (BMI) OF 45.0 TO 49.9 IN ADULT, UNSPECIFIED WHETHER SERIOUS COMORBIDITY PRESENT (HCC): Primary | ICD-10-CM

## 2021-02-25 DIAGNOSIS — Z72.0 TOBACCO ABUSE: ICD-10-CM

## 2021-02-25 DIAGNOSIS — R11.2 NAUSEA AND VOMITING, INTRACTABILITY OF VOMITING NOT SPECIFIED, UNSPECIFIED VOMITING TYPE: ICD-10-CM

## 2021-02-25 LAB — HBA1C MFR BLD: 5.8 %

## 2021-02-25 PROCEDURE — 99213 OFFICE O/P EST LOW 20 MIN: CPT | Performed by: INTERNAL MEDICINE

## 2021-02-25 PROCEDURE — 4004F PT TOBACCO SCREEN RCVD TLK: CPT | Performed by: INTERNAL MEDICINE

## 2021-02-25 PROCEDURE — G8427 DOCREV CUR MEDS BY ELIG CLIN: HCPCS | Performed by: INTERNAL MEDICINE

## 2021-02-25 PROCEDURE — 83036 HEMOGLOBIN GLYCOSYLATED A1C: CPT | Performed by: INTERNAL MEDICINE

## 2021-02-25 PROCEDURE — G8484 FLU IMMUNIZE NO ADMIN: HCPCS | Performed by: INTERNAL MEDICINE

## 2021-02-25 PROCEDURE — G8417 CALC BMI ABV UP PARAM F/U: HCPCS | Performed by: INTERNAL MEDICINE

## 2021-02-25 RX ORDER — ONDANSETRON 4 MG/1
TABLET, ORALLY DISINTEGRATING ORAL
COMMUNITY
Start: 2021-02-14 | End: 2021-09-15

## 2021-02-25 RX ORDER — OMEPRAZOLE 40 MG/1
CAPSULE, DELAYED RELEASE ORAL
COMMUNITY
Start: 2021-02-19 | End: 2021-09-15

## 2021-02-25 RX ORDER — CLONAZEPAM 0.5 MG/1
TABLET ORAL
COMMUNITY
Start: 2021-02-02

## 2021-02-25 NOTE — PROGRESS NOTES
2/25/2021  Visit type: Established patient    Reason for Visit: Abdominal Pain (f/u: not any better)      ASSESSMENT AND PLAN   1. Class 3 severe obesity due to excess calories with body mass index (BMI) of 45.0 to 49.9 in adult, unspecified whether serious comorbidity present [facility])  And new dx of prediabetes. Counseled on nutrition and exercise. He has new treadmill on the way and will begin. After GI issues resolved will consider GLP1 agonist.        2. Tobacco abuse  Encouraged smoking cessation. 3. Nausea and vomiting, intractability of vomiting not specified, unspecified vomiting type  Unclear cause, has f/u with GI    4. Prediabetes  As above       ----------------------------------------------------------------------    SUBJECTIVE    Chief Complaint   Patient presents with    Abdominal Pain     f/u: not any better     HPI   Still has intermittent vomiting and abd discomfort. No new complaints. Review of Systems   Denies fever, chills, chest pain, SOB     OBJECTIVE    /72   Pulse 76   Temp 98.7 °F (37.1 °C) (Temporal)   Ht 5' 11\" (1.803 m)   Wt (!) 341 lb 3.2 oz (154.8 kg)   SpO2 96%   BMI 47.59 kg/m²   Physical Exam  Vitals signs reviewed. Constitutional:       General: He is not in acute distress. Pulmonary:      Effort: No respiratory distress.    Neurological:      Mental Status: He is alert.         ---------------------------------------------------------------------------

## 2021-07-18 ENCOUNTER — HOSPITAL ENCOUNTER (EMERGENCY)
Age: 36
Discharge: HOME | End: 2021-07-18
Payer: MEDICAID

## 2021-07-18 ENCOUNTER — APPOINTMENT (OUTPATIENT)
Dept: GENERAL RADIOLOGY | Age: 36
End: 2021-07-18
Payer: MEDICAID

## 2021-07-18 VITALS
HEART RATE: 89 BPM | HEIGHT: 72 IN | SYSTOLIC BLOOD PRESSURE: 162 MMHG | TEMPERATURE: 98 F | DIASTOLIC BLOOD PRESSURE: 90 MMHG | RESPIRATION RATE: 20 BRPM | OXYGEN SATURATION: 98 % | WEIGHT: 315 LBS | BODY MASS INDEX: 42.66 KG/M2

## 2021-07-18 DIAGNOSIS — S93.519A SPRAIN OF INTERPHALANGEAL JOINT OF TOE, INITIAL ENCOUNTER: Primary | ICD-10-CM

## 2021-07-18 PROCEDURE — 73630 X-RAY EXAM OF FOOT: CPT

## 2021-07-18 PROCEDURE — 6370000000 HC RX 637 (ALT 250 FOR IP): Performed by: PHYSICIAN ASSISTANT

## 2021-07-18 PROCEDURE — 99283 EMERGENCY DEPT VISIT LOW MDM: CPT

## 2021-07-18 RX ORDER — NAPROXEN 500 MG/1
500 TABLET ORAL 2 TIMES DAILY WITH MEALS
Qty: 20 TABLET | Refills: 0 | Status: SHIPPED | OUTPATIENT
Start: 2021-07-18 | End: 2021-09-15

## 2021-07-18 RX ORDER — NAPROXEN 500 MG/1
500 TABLET ORAL ONCE
Status: COMPLETED | OUTPATIENT
Start: 2021-07-18 | End: 2021-07-18

## 2021-07-18 RX ADMIN — NAPROXEN 500 MG: 500 TABLET ORAL at 14:29

## 2021-07-18 ASSESSMENT — PAIN DESCRIPTION - LOCATION: LOCATION: TOE (COMMENT WHICH ONE)

## 2021-07-18 ASSESSMENT — PAIN DESCRIPTION - FREQUENCY: FREQUENCY: CONTINUOUS

## 2021-07-18 ASSESSMENT — PAIN DESCRIPTION - ORIENTATION: ORIENTATION: LEFT

## 2021-07-18 ASSESSMENT — PAIN SCALES - GENERAL: PAINLEVEL_OUTOF10: 9

## 2021-07-18 ASSESSMENT — PAIN DESCRIPTION - PAIN TYPE: TYPE: ACUTE PAIN

## 2021-07-18 NOTE — ED PROVIDER NOTES
[facility]    HPI:  7/18/21, Time: 2:16 PM EDT         Bayron Barber is a 29 y.o. male presenting to the ED for left great toe pain, beginning yesterday. The complaint has been persistent, moderate in severity, and worsened by movement of left great toe and ambulation. Patient reports he tripped over this toe injuring it yesterday. Denies previous injury to this area. States it has been swelling at home. Afebrile without recent travel or sick contacts. Patient denies all other symptoms and injuries at this time. Review of Systems:   A complete review of systems was performed and pertinent positives and negatives are stated within HPI, all other systems reviewed and are negative.          --------------------------------------------- PAST HISTORY ---------------------------------------------  Past Medical History:  has a past medical history of Allergic rhinitis, Chronic back pain, Depression, and Hypertension. Past Surgical History:  has a past surgical history that includes Abscess Drainage. Social History:  reports that he has been smoking cigarettes. He started smoking about 24 years ago. He has a 7.50 pack-year smoking history. He has never used smokeless tobacco. He reports current alcohol use. He reports that he does not use drugs. Family History: family history is not on file. The patients home medications have been reviewed. Allergies: Patient has no known allergies. -------------------------------------------------- RESULTS -------------------------------------------------  All laboratory and radiology results have been personally reviewed by myself   LABS:  No results found for this visit on 07/18/21. RADIOLOGY:  Interpreted by Radiologist.  XR FOOT LEFT (MIN 3 VIEWS)   Final Result   No acute osseous abnormality. Mild degenerative change. Small posterior calcaneal spur.              ------------------------- NURSING NOTES AND VITALS REVIEWED ---------------------------   The nursing notes within the ED encounter and vital signs as below have been reviewed. BP (!) 162/90   Pulse 89   Temp 98 °F (36.7 °C) (Temporal)   Resp 20   Ht 6' (1.829 m)   Wt (!) 350 lb (158.8 kg)   SpO2 98%   BMI 47.47 kg/m²   Oxygen Saturation Interpretation: Normal      ---------------------------------------------------PHYSICAL EXAM--------------------------------------      Constitutional/General: Alert and oriented x3, well appearing, non toxic in NAD  Head: Normocephalic and atraumatic  Eyes: PERRL, EOMI  Mouth: Oropharynx clear, handling secretions, no trismus  Neck: Supple, full ROM,   Pulmonary: Lungs clear to auscultation bilaterally, no wheezes, rales, or rhonchi. Not in respiratory distress  Cardiovascular:  Regular rate and rhythm, no murmurs, gallops, or rubs. 2+ distal pulses  Abdomen: Soft, non tender, non distended,   Extremities: Moves all extremities x 4. Warm and well perfused. Ecchymosis, swelling, and tenderness to palpation to entire left great toe. Skin: warm and dry without rash  Neurologic: GCS 15,  Psych: Normal Affect      ------------------------------ ED COURSE/MEDICAL DECISION MAKING----------------------  Medications   naproxen (NAPROSYN) tablet 500 mg (500 mg Oral Given 7/18/21 1429)         ED COURSE:       Medical Decision Making:    Patient is a 43-year-old male presenting emergency department left toe injury. X-ray does not reveal any acute pathology. He is neurovascularly intact in the emergency department. Patient was given a postop shoe and a prescription for naproxen. Encouraged close follow-up with PCP for recheck return to the emergency department with any new or worsening symptoms. Patient and his wife voiced understanding and are agreeable to the above treatment plan. Counseling:    The emergency provider has spoken with the patient and spouse/SO and discussed todays results, in addition to providing specific details for the plan of care and counseling regarding the diagnosis and prognosis. Questions are answered at this time and they are agreeable with the plan.      --------------------------------- IMPRESSION AND DISPOSITION ---------------------------------    IMPRESSION  1. Sprain of interphalangeal joint of toe, initial encounter        DISPOSITION  Disposition: Discharge to home  Patient condition is stable      NOTE: This report was transcribed using voice recognition software.  Every effort was made to ensure accuracy; however, inadvertent computerized transcription errors may be present        Ascencion Coto, [id number]  07/18/21 3952

## 2021-09-04 ENCOUNTER — HOSPITAL ENCOUNTER (EMERGENCY)
Age: 36
Discharge: HOME | End: 2021-09-04
Attending: EMERGENCY MEDICINE
Payer: MEDICAID

## 2021-09-04 VITALS
OXYGEN SATURATION: 99 % | DIASTOLIC BLOOD PRESSURE: 60 MMHG | SYSTOLIC BLOOD PRESSURE: 124 MMHG | HEART RATE: 78 BPM | RESPIRATION RATE: 18 BRPM | TEMPERATURE: 97.8 F

## 2021-09-04 DIAGNOSIS — R53.83 FATIGUE, UNSPECIFIED TYPE: Primary | ICD-10-CM

## 2021-09-04 DIAGNOSIS — E86.0 DEHYDRATION: ICD-10-CM

## 2021-09-04 LAB
ALBUMIN SERPL-MCNC: 4.3 G/DL (ref 3.5–5.2)
ALP BLD-CCNC: 93 U/L (ref 40–129)
ALT SERPL-CCNC: 102 U/L (ref 0–40)
ANION GAP SERPL CALCULATED.3IONS-SCNC: 7 MMOL/L (ref 7–16)
AST SERPL-CCNC: 55 U/L (ref 0–39)
BASOPHILS ABSOLUTE: 0.03 E9/L (ref 0–0.2)
BASOPHILS RELATIVE PERCENT: 0.4 % (ref 0–2)
BILIRUB SERPL-MCNC: 0.4 MG/DL (ref 0–1.2)
BILIRUBIN URINE: NEGATIVE
BLOOD, URINE: NEGATIVE
BUN BLDV-MCNC: 11 MG/DL (ref 6–20)
CALCIUM SERPL-MCNC: 9.1 MG/DL (ref 8.6–10.2)
CHLORIDE BLD-SCNC: 107 MMOL/L (ref 98–107)
CHP ED QC CHECK: NORMAL
CLARITY: CLEAR
CO2: 25 MMOL/L (ref 22–29)
COLOR: YELLOW
CREAT SERPL-MCNC: 0.9 MG/DL (ref 0.7–1.2)
EOSINOPHILS ABSOLUTE: 0.26 E9/L (ref 0.05–0.5)
EOSINOPHILS RELATIVE PERCENT: 3.8 % (ref 0–6)
GFR AFRICAN AMERICAN: >60
GFR NON-AFRICAN AMERICAN: >60 ML/MIN/1.73
GLUCOSE BLD-MCNC: 117 MG/DL
GLUCOSE BLD-MCNC: 124 MG/DL (ref 74–99)
GLUCOSE URINE: NEGATIVE MG/DL
HCT VFR BLD CALC: 46.3 % (ref 37–54)
HEMOGLOBIN: 15.5 G/DL (ref 12.5–16.5)
IMMATURE GRANULOCYTES #: 0.03 E9/L
IMMATURE GRANULOCYTES %: 0.4 % (ref 0–5)
KETONES, URINE: NEGATIVE MG/DL
LACTIC ACID: 0.9 MMOL/L (ref 0.5–2.2)
LEUKOCYTE ESTERASE, URINE: NEGATIVE
LYMPHOCYTES ABSOLUTE: 3.45 E9/L (ref 1.5–4)
LYMPHOCYTES RELATIVE PERCENT: 50.1 % (ref 20–42)
MAGNESIUM: 2.2 MG/DL (ref 1.6–2.6)
MCH RBC QN AUTO: 27.6 PG (ref 26–35)
MCHC RBC AUTO-ENTMCNC: 33.5 % (ref 32–34.5)
MCV RBC AUTO: 82.5 FL (ref 80–99.9)
METER GLUCOSE: 117 MG/DL (ref 74–99)
MONOCYTES ABSOLUTE: 0.62 E9/L (ref 0.1–0.95)
MONOCYTES RELATIVE PERCENT: 9 % (ref 2–12)
NEUTROPHILS ABSOLUTE: 2.5 E9/L (ref 1.8–7.3)
NEUTROPHILS RELATIVE PERCENT: 36.3 % (ref 43–80)
NITRITE, URINE: NEGATIVE
PDW BLD-RTO: 12.4 FL (ref 11.5–15)
PH UA: 6.5 (ref 5–9)
PLATELET # BLD: 218 E9/L (ref 130–450)
PMV BLD AUTO: 8.7 FL (ref 7–12)
POTASSIUM SERPL-SCNC: 4 MMOL/L (ref 3.5–5)
PROTEIN UA: NEGATIVE MG/DL
RBC # BLD: 5.61 E12/L (ref 3.8–5.8)
SODIUM BLD-SCNC: 139 MMOL/L (ref 132–146)
SPECIFIC GRAVITY UA: 1.02 (ref 1–1.03)
TOTAL PROTEIN: 7.2 G/DL (ref 6.4–8.3)
UROBILINOGEN, URINE: 0.2 E.U./DL
WBC # BLD: 6.9 E9/L (ref 4.5–11.5)

## 2021-09-04 PROCEDURE — U0005 INFEC AGEN DETEC AMPLI PROBE: HCPCS

## 2021-09-04 PROCEDURE — 80053 COMPREHEN METABOLIC PANEL: CPT

## 2021-09-04 PROCEDURE — 85025 COMPLETE CBC W/AUTO DIFF WBC: CPT

## 2021-09-04 PROCEDURE — 83735 ASSAY OF MAGNESIUM: CPT

## 2021-09-04 PROCEDURE — 99283 EMERGENCY DEPT VISIT LOW MDM: CPT

## 2021-09-04 PROCEDURE — 82962 GLUCOSE BLOOD TEST: CPT

## 2021-09-04 PROCEDURE — 81003 URINALYSIS AUTO W/O SCOPE: CPT

## 2021-09-04 PROCEDURE — 83605 ASSAY OF LACTIC ACID: CPT

## 2021-09-04 PROCEDURE — U0003 INFECTIOUS AGENT DETECTION BY NUCLEIC ACID (DNA OR RNA); SEVERE ACUTE RESPIRATORY SYNDROME CORONAVIRUS 2 (SARS-COV-2) (CORONAVIRUS DISEASE [COVID-19]), AMPLIFIED PROBE TECHNIQUE, MAKING USE OF HIGH THROUGHPUT TECHNOLOGIES AS DESCRIBED BY CMS-2020-01-R: HCPCS

## 2021-09-04 RX ORDER — 0.9 % SODIUM CHLORIDE 0.9 %
1000 INTRAVENOUS SOLUTION INTRAVENOUS ONCE
Status: DISCONTINUED | OUTPATIENT
Start: 2021-09-04 | End: 2021-09-04 | Stop reason: HOSPADM

## 2021-09-04 NOTE — ED PROVIDER NOTES
HPI:  9/4/21, Time: 5:32 PM EDT         Bibi Navarrete is a 29 y.o. male presenting to the ED for fatigue, dry mouth, urinary frequency, beginning 2-3 weeks ago. The complaint has been persistent, moderate in severity, and worsened by nothing. Patient is worried he has diabetes  . Patient denies fever/chills, sore throat, cough, congestion, chest pain, shortness of breath, edema, headache, visual disturbances, focal paresthesias, focal weakness, abdominal pain, nausea, vomiting, diarrhea, constipation, dysuria, hematuria, trauma, neck or back pain, rash or other complaints. ROS:   A complete review of systems was performed and all pertinent positives and negatives are stated within HPI, all other systems reviewed and are negative.      --------------------------------------------- PAST HISTORY ---------------------------------------------  Past Medical History:  has a past medical history of Allergic rhinitis, Chronic back pain, Depression, and Hypertension. Past Surgical History:  has a past surgical history that includes Abscess Drainage. Social History:  reports that he has been smoking cigarettes. He started smoking about 24 years ago. He has a 7.50 pack-year smoking history. He has never used smokeless tobacco. He reports current alcohol use. He reports that he does not use drugs. Family History: family history is not on file. The patients home medications have been reviewed. Allergies: Patient has no known allergies. ----------------------------------------PHYSICAL EXAM--------------------------------------  Constitutional:  Well developed, well nourished, obese, no acute distress, non-toxic appearance   Eyes:  PERRL, conjunctiva normal, EOMI  HENT:  Atraumatic, external ears normal, nose normal, oropharynx dry, no pharyngeal exudates.  Neck- normal range of motion, no nuchal rigidity   Respiratory:  No respiratory distress, normal breath sounds, no rales, no wheezing Cardiovascular:  Normal rate, normal rhythm, no murmurs, no gallops, no rubs. Radial and DP pulses 2+ bilaterally. GI:  Soft, nondistended, normal bowel sounds, nontender, no organomegaly, no mass, no rebound, no guarding   :  No costovertebral angle tenderness   Musculoskeletal:  No edema, no tenderness, no deformities. Back- no tenderness  Integument:  Well hydrated, no rash. Adequate perfusion. Lymphatic:  No cervical lymphadenopathy noted   Neurologic:  Alert & oriented x 3, CN 2-12 normal, normal motor function, normal sensory function, no focal deficits noted. Normal gait. Psychiatric:  Speech and behavior appropriate       -------------------------------------------------- RESULTS -------------------------------------------------  I have personally reviewed all laboratory and imaging results for this patient. Results are listed below.      LABS:  Results for orders placed or performed during the hospital encounter of 09/04/21   CBC Auto Differential   Result Value Ref Range    WBC 6.9 4.5 - 11.5 E9/L    RBC 5.61 3.80 - 5.80 E12/L    Hemoglobin 15.5 12.5 - 16.5 g/dL    Hematocrit 46.3 37.0 - 54.0 %    MCV 82.5 80.0 - 99.9 fL    MCH 27.6 26.0 - 35.0 pg    MCHC 33.5 32.0 - 34.5 %    RDW 12.4 11.5 - 15.0 fL    Platelets 770 745 - 793 E9/L    MPV 8.7 7.0 - 12.0 fL    Neutrophils % 36.3 (L) 43.0 - 80.0 %    Immature Granulocytes % 0.4 0.0 - 5.0 %    Lymphocytes % 50.1 (H) 20.0 - 42.0 %    Monocytes % 9.0 2.0 - 12.0 %    Eosinophils % 3.8 0.0 - 6.0 %    Basophils % 0.4 0.0 - 2.0 %    Neutrophils Absolute 2.50 1.80 - 7.30 E9/L    Immature Granulocytes # 0.03 E9/L    Lymphocytes Absolute 3.45 1.50 - 4.00 E9/L    Monocytes Absolute 0.62 0.10 - 0.95 E9/L    Eosinophils Absolute 0.26 0.05 - 0.50 E9/L    Basophils Absolute 0.03 0.00 - 0.20 E9/L   Comprehensive Metabolic Panel   Result Value Ref Range    Sodium 139 132 - 146 mmol/L    Potassium 4.0 3.5 - 5.0 mmol/L    Chloride 107 98 - 107 mmol/L    CO2 25 22 - 29 mmol/L    Anion Gap 7 7 - 16 mmol/L    Glucose 124 (H) 74 - 99 mg/dL    BUN 11 6 - 20 mg/dL    CREATININE 0.9 0.7 - 1.2 mg/dL    GFR Non-African American >60 >=60 mL/min/1.73    GFR African American >60     Calcium 9.1 8.6 - 10.2 mg/dL    Total Protein 7.2 6.4 - 8.3 g/dL    Albumin 4.3 3.5 - 5.2 g/dL    Total Bilirubin 0.4 0.0 - 1.2 mg/dL    Alkaline Phosphatase 93 40 - 129 U/L     (H) 0 - 40 U/L    AST 55 (H) 0 - 39 U/L   Urinalysis   Result Value Ref Range    Color, UA Yellow Straw/Yellow    Clarity, UA Clear Clear    Glucose, Ur Negative Negative mg/dL    Bilirubin Urine Negative Negative    Ketones, Urine Negative Negative mg/dL    Specific Gravity, UA 1.025 1.005 - 1.030    Blood, Urine Negative Negative    pH, UA 6.5 5.0 - 9.0    Protein, UA Negative Negative mg/dL    Urobilinogen, Urine 0.2 <2.0 E.U./dL    Nitrite, Urine Negative Negative    Leukocyte Esterase, Urine Negative Negative   Magnesium   Result Value Ref Range    Magnesium 2.2 1.6 - 2.6 mg/dL   Lactic Acid, Plasma   Result Value Ref Range    Lactic Acid 0.9 0.5 - 2.2 mmol/L   POCT Glucose   Result Value Ref Range    Glucose 117 mg/dL    QC OK? y    POCT Glucose   Result Value Ref Range    Meter Glucose 117 (H) 74 - 99 mg/dL       RADIOLOGY:  Interpreted by Radiologist.  No orders to display             ------------------------- NURSING NOTES AND VITALS REVIEWED ---------------------------  The nursing notes within the ED encounter and vital signs as below have been reviewed by myself. /60   Pulse 78   Temp 97.8 °F (36.6 °C) (Temporal)   Resp 18   SpO2 99%   Oxygen Saturation Interpretation: Normal      The patients available past medical records and past encounters were reviewed.         ------------------------------ ED COURSE/MEDICAL DECISION MAKING----------------------  Medications   0.9 % sodium chloride bolus (1,000 mLs IntraVENous Not Given 9/4/21 1751)     ED Course as of Sep 04 1809   Sat Sep 04, 2021   1204 ATTENDING PROVIDER ATTESTATION:     I have personally performed and/or participated in the history, exam, medical decision making, and procedures and agree with all pertinent clinical information unless otherwise noted. I have also reviewed and agree with the past medical, family and social history unless otherwise noted. I have discussed this patient in detail with the resident, and provided the instruction and education regarding. My findings/plan: 43-year-old male presenting for evaluation of 3 weeks of increased thirst, urinary frequency, and fatigue. Patiently in bed no acute distress. Abdomen soft nontender, lungs clear to auscultation bilaterally, heart regular rate and rhythm. Obtain labs to rule out hyperglycemia and UA.          [BB]      ED Course User Index  [BB] Ellis Owen,        MEDICATIONS  Discharge Medication List as of 9/4/2021  6:01 PM            Medical Decision Making:    Patient is 43-year-old male presented to the emergency department for symptoms of fatigue and will be tested for diabetes. His glucose found to be within normal limits patient was found in physical exam to be moderately dehydrated. He was offered IV fluids however he stated he would prefer oral here hydration and was tolerating oral rehydration. Patient was given outpatient testing for Covid and will be discharged with follow-up to his PCP for further testing evaluation. Patient was explicitly instructed on specific signs and symptoms on which to return to the emergency room for. Patient was instructed to return to the ER for any new or worsening symptoms. Additional discharge instructions were given verbally. All questions were answered. Patient is comfortable and agreeable with discharge plan. Patient in no acute distress and non-toxic in appearance.        This patient's ED course included: a personal history and physicial examination, re-evaluation prior to disposition, multiple bedside re-evaluations, IV medications, cardiac monitoring, continuous pulse oximetry, a and a personal history and physicial eaxmination    This patient has remained hemodynamically stable and been closely monitored during their ED course. Re-Evaluations:  Time: 1800   Re-evaluation. Patients symptoms show no change  Repeat physical examination is not changed        Counseling: The emergency provider has spoken with the patient and discussed todays results, in addition to providing specific details for the plan of care and counseling regarding the diagnosis and prognosis. Questions are answered at this time and they are agreeable with the plan.    --------------------------- IMPRESSION AND DISPOSITION ---------------------------------    IMPRESSION  1. Fatigue, unspecified type    2.  Dehydration        DISPOSITION  Disposition: Discharge to home  Patient condition is good             Darren Hobson DO  Resident  09/04/21 [id number]

## 2021-09-04 NOTE — ED NOTES
FIRST PROVIDER CONTACT ASSESSMENT NOTE      Department of Emergency Medicine   Admit Date: No admission date for patient encounter. Chief Complaint: Fatigue, Dizziness, and Urinary Frequency      History of Present Illness:    Bryan Lieberman is a 29 y.o. male who presents to the ED for plaints of fatigue, dizziness with urinary frequency, polydipsia and polyphagia. States he is concerned that he is diabetic. He has been told in the past he was prediabetic. States he did not like his last primary care physician therefore he never went back.   He continues take medication that was prescribed 3 months ago.        -----------------END OF FIRST PROVIDER CONTACT ASSESSMENT NOTE--------------  Electronically signed by FADIA Craig CNP   DD: 9/4/21               FADIA Humphries CNP  09/04/21 9090

## 2021-09-05 LAB — SARS-COV-2, PCR: NOT DETECTED

## 2021-09-10 ENCOUNTER — CARE COORDINATION (OUTPATIENT)
Dept: CARE COORDINATION | Age: 36
End: 2021-09-10

## 2021-09-10 NOTE — CARE COORDINATION
-ACM spoke to patient to follow up on recent ED visit on 9-4-2021 for post ED COVID-19 Monitoring, however Pt reports he is at work and unable to talk at this time.   -Pt requests a return call, ACM agreed.

## 2021-09-13 ENCOUNTER — CARE COORDINATION (OUTPATIENT)
Dept: CARE COORDINATION | Age: 36
End: 2021-09-13

## 2021-09-13 NOTE — CARE COORDINATION
-ACM spoke to patient to follow up on recent ED visit for post ED COVID-19 Monitoring, however Pt said this is not a good time to talk because he is at work. -Pt not aware of his Covid test results. Informed Pt that Covid test on 9-4-2021 was not detected. -Pt reports fatigue and dizziness are gone. Urinary frequency and dry mouth continue. -PCP appt 9-. Pt said he will be going to PCP  appt and will discuss symptoms and concerns.   -Pt reports no further questions at this time.  -Pt declines and further ED follow up calls. Will remove name from care team and resolve Covid episode. No further outreaches will be made.

## 2021-09-15 ENCOUNTER — OFFICE VISIT (OUTPATIENT)
Dept: FAMILY MEDICINE CLINIC | Age: 36
End: 2021-09-15
Payer: MEDICAID

## 2021-09-15 VITALS
TEMPERATURE: 97 F | OXYGEN SATURATION: 96 % | DIASTOLIC BLOOD PRESSURE: 89 MMHG | SYSTOLIC BLOOD PRESSURE: 135 MMHG | HEIGHT: 69 IN | HEART RATE: 83 BPM | BODY MASS INDEX: 46.65 KG/M2 | WEIGHT: 315 LBS

## 2021-09-15 DIAGNOSIS — Z72.0 TOBACCO ABUSE: ICD-10-CM

## 2021-09-15 DIAGNOSIS — R73.03 PREDIABETES: ICD-10-CM

## 2021-09-15 DIAGNOSIS — Z76.89 ENCOUNTER TO ESTABLISH CARE: Primary | ICD-10-CM

## 2021-09-15 DIAGNOSIS — I10 ESSENTIAL HYPERTENSION: ICD-10-CM

## 2021-09-15 DIAGNOSIS — E66.01 CLASS 3 SEVERE OBESITY DUE TO EXCESS CALORIES WITH BODY MASS INDEX (BMI) OF 45.0 TO 49.9 IN ADULT, UNSPECIFIED WHETHER SERIOUS COMORBIDITY PRESENT (HCC): ICD-10-CM

## 2021-09-15 PROCEDURE — G8417 CALC BMI ABV UP PARAM F/U: HCPCS | Performed by: FAMILY MEDICINE

## 2021-09-15 PROCEDURE — G8427 DOCREV CUR MEDS BY ELIG CLIN: HCPCS | Performed by: FAMILY MEDICINE

## 2021-09-15 PROCEDURE — 4004F PT TOBACCO SCREEN RCVD TLK: CPT | Performed by: FAMILY MEDICINE

## 2021-09-15 PROCEDURE — 99204 OFFICE O/P NEW MOD 45 MIN: CPT | Performed by: FAMILY MEDICINE

## 2021-09-15 RX ORDER — GABAPENTIN 100 MG/1
100 CAPSULE ORAL 3 TIMES DAILY
COMMUNITY
Start: 2021-08-31

## 2021-09-15 RX ORDER — ARIPIPRAZOLE 5 MG/1
5 TABLET ORAL NIGHTLY
COMMUNITY
Start: 2021-09-01 | End: 2022-09-19

## 2021-09-15 RX ORDER — TOPIRAMATE 25 MG/1
TABLET ORAL
COMMUNITY
Start: 2021-08-31

## 2021-09-15 RX ORDER — TRAZODONE HYDROCHLORIDE 150 MG/1
2 TABLET ORAL NIGHTLY
COMMUNITY
Start: 2021-09-01

## 2021-09-15 ASSESSMENT — ENCOUNTER SYMPTOMS
RESPIRATORY NEGATIVE: 1
ALLERGIC/IMMUNOLOGIC NEGATIVE: 1
EYES NEGATIVE: 1
GASTROINTESTINAL NEGATIVE: 1

## 2021-09-15 NOTE — PROGRESS NOTES
OFFICE PROGRESS NOTE      SUBJECTIVE:        Patient ID:   Landy Casas is a 29 y.o. male who presents for   Chief Complaint   Patient presents with   Saeid Manzo Establish Care     Here to establish self as a patient. Wants testing for diabetes due to family history of DM           HPI:   Patient states is feeling okay  He did not take any blood pressure medication  He wants to be checked for the diabetes  History of diabetes in the family  Patient states he works long hours and is on his feet all day  He did not follow the diet  He still continues to smoke        Prior to Visit Medications    Medication Sig Taking? Authorizing Provider   clonazePAM (KLONOPIN) 0.5 MG tablet TAKE 1 AND 1/2 TABLETS BY MOUTH DAILY AS NEEDED Yes Historical Provider, MD   escitalopram (LEXAPRO) 20 MG tablet TAKE 1 TABLET BY MOUTH DAILY Yes Historical Provider, MD   hydrOXYzine (VISTARIL) 50 MG capsule TAKE 1 CAPSULE BY MOUTH TWICE DAILY AS NEEDED Yes Historical Provider, MD   naproxen (NAPROSYN) 500 MG tablet Take 1 tablet by mouth 2 times daily for 7 days Yes Bob Ackerman PA-C   traZODone (DESYREL) 150 MG tablet Take 2 tablets by mouth nightly  Historical Provider, MD   topiramate (TOPAMAX) 25 MG tablet TAKE 2 TABLETS BY MOUTH AT BEDTIME  Historical Provider, MD   gabapentin (NEURONTIN) 100 MG capsule Take 100 mg by mouth.  3 tablets q am  Historical Provider, MD   ARIPiprazole (ABILIFY) 5 MG tablet Take 5 mg by mouth nightly  Historical Provider, MD   zolpidem (AMBIEN) 5 MG tablet TAKE 1 TABLET BY MOUTH EVERY NIGHT AT BEDTIME AS NEEDED  Patient not taking: Reported on 9/15/2021  Historical Provider, MD      Social History     Socioeconomic History    Marital status: Single     Spouse name: None    Number of children: None    Years of education: None    Highest education level: None   Occupational History    None   Tobacco Use    Smoking status: Current Every Day Smoker     Packs/day: 0.50 Years: 15.00     Pack years: 7.50     Types: Cigarettes     Start date: 1997    Smokeless tobacco: Never Used   Vaping Use    Vaping Use: Never used   Substance and Sexual Activity    Alcohol use: Yes     Alcohol/week: 0.0 standard drinks     Comment: occ    Drug use: No     Comment: medical marijuana    Sexual activity: Yes     Partners: Female   Other Topics Concern    None   Social History Narrative    None     Social Determinants of Health     Financial Resource Strain: High Risk    Difficulty of Paying Living Expenses: Hard   Food Insecurity: Food Insecurity Present    Worried About Running Out of Food in the Last Year: Often true    Andrew of Food in the Last Year: Often true   Transportation Needs: No Transportation Needs    Lack of Transportation (Medical): No    Lack of Transportation (Non-Medical): No   Physical Activity:     Days of Exercise per Week:     Minutes of Exercise per Session:    Stress:     Feeling of Stress :    Social Connections:     Frequency of Communication with Friends and Family:     Frequency of Social Gatherings with Friends and Family:     Attends Caoda[employer] Services:     Active Member of Clubs or Organizations:     Attends Club or Organization Meetings:     Marital Status:    Intimate Partner Violence:     Fear of Current or Ex-Partner:     Emotionally Abused:     Physically Abused:     Sexually Abused:        I have reviewed Bob's allergies, medications, problem list, medical, social and family history and have updated as needed in the electronic medical record  Review Of Systems:    Review of Systems   Constitutional: Negative. HENT: Negative. Eyes: Negative. Respiratory: Negative. Cardiovascular: Negative. Gastrointestinal: Negative. Endocrine: Negative. Genitourinary: Negative. Musculoskeletal: Negative. Allergic/Immunologic: Negative. Neurological: Negative. Hematological: Negative.     Psychiatric/Behavioral: Negative. OBJECTIVE:     VS:  Wt Readings from Last 3 Encounters:   09/15/21 (!) 360 lb (163.3 kg)   07/18/21 (!) 350 lb (158.8 kg)   02/25/21 (!) 341 lb 3.2 oz (154.8 kg)     Temp Readings from Last 3 Encounters:   09/15/21 97 °F (36.1 °C) (Infrared)   09/04/21 97.8 °F (36.6 °C) (Temporal)   07/18/21 98 °F (36.7 °C) (Temporal)     BP Readings from Last 3 Encounters:   09/15/21 135/89   09/04/21 124/60   07/18/21 (!) 162/90        Physical Exam  Constitutional:       Appearance: He is well-developed. HENT:      Head: Normocephalic and atraumatic. Eyes:      Conjunctiva/sclera: Conjunctivae normal.      Pupils: Pupils are equal, round, and reactive to light. Cardiovascular:      Rate and Rhythm: Normal rate and regular rhythm. Heart sounds: Normal heart sounds. Pulmonary:      Effort: Pulmonary effort is normal.      Breath sounds: Normal breath sounds. Abdominal:      General: Bowel sounds are normal.      Palpations: Abdomen is soft. Musculoskeletal:         General: Normal range of motion. Cervical back: Normal range of motion and neck supple. Skin:     General: Skin is warm and dry. Neurological:      Mental Status: He is alert and oriented to person, place, and time.    Psychiatric:         Behavior: Behavior normal.            Labs :    Lab Results   Component Value Date    WBC 6.9 09/04/2021    HGB 15.5 09/04/2021    HCT 46.3 09/04/2021     09/04/2021    CHOL 178 12/29/2015    TRIG 92 12/29/2015    HDL 46 12/29/2015     (H) 09/04/2021    AST 55 (H) 09/04/2021     09/04/2021    K 4.0 09/04/2021     09/04/2021    CREATININE 0.9 09/04/2021    BUN 11 09/04/2021    CO2 25 09/04/2021    TSH 2.050 12/29/2015    INR 1.1 08/24/2016    LABA1C 5.8 02/25/2021     Lab Results   Component Value Date    COLORU Yellow 09/04/2021    NITRU Negative 09/04/2021    GLUCOSEU Negative 09/04/2021    KETUA Negative 09/04/2021    UROBILINOGEN 0.2 09/04/2021    BILIRUBINUR Negative 09/04/2021     No results found for: PSA, CEA, , [id number],       Controlled Substances Monitoring:                                    ASSESSMENT     Patient Active Problem List    Diagnosis Date Noted    Prediabetes 02/25/2021    Nausea and vomiting 02/25/2021    Essential hypertension 01/20/2021    Class 3 severe obesity due to excess calories with body mass index (BMI) of 45.0 to 49.9 in adult [facility]) 01/20/2021    Abdominal pain 09/06/2016    Peptic disease 90/04/4129    Biliary colic 95/92/9656    Acute nasopharyngitis 01/08/2016    Contact dermatitis and eczema due to detergents 01/08/2016    Tobacco abuse 12/07/2015    Midline low back pain without sciatica 12/07/2015    Encounter for long-term (current) use of medications 12/07/2015    Chronic fatigue 12/07/2015    Anxiety 12/07/2015        Diagnosis:     ICD-10-CM    1. Encounter to establish care  Z76.89 CBC WITH AUTO DIFFERENTIAL   2. Class 3 severe obesity due to excess calories with body mass index (BMI) of 45.0 to 49.9 in adult, unspecified whether serious comorbidity present ([facility])  E66.01 CBC WITH AUTO DIFFERENTIAL    Z68.42 COMPREHENSIVE METABOLIC PANEL     LIPID PANEL   3. Tobacco abuse  Z72.0    4. Prediabetes  R73.03 HEMOGLOBIN A1C   5. Essential hypertension  I10        PLAN:   Continue present treatment  Serial blood pressure readings  Low-sodium low-fat low-carb weight reduction diet  Quit smoking  Regular exercises  Return to clinic earlier if any problems        Patient Instructions   Continue present treatment  Low-sodium low-fat low-carb diet  Weight reduction  Regular exercises  Get the lab work done  Stop smoking  Return to clinic earlier if any problems      Return in about 2 weeks (around 9/29/2021). Vangie Valverde reviewed my findings and recommendations with Talib Cruz.     Electronicallysigned by Eunice Carmichael MD on 9/15/21 at 1:25 PM EDT

## 2021-09-15 NOTE — PATIENT INSTRUCTIONS
Continue present treatment  Low-sodium low-fat low-carb diet  Weight reduction  Regular exercises  Get the lab work done  Stop smoking  Return to clinic earlier if any problems

## 2021-10-28 ENCOUNTER — HOSPITAL ENCOUNTER (EMERGENCY)
Age: 36
Discharge: HOME | End: 2021-10-28
Attending: EMERGENCY MEDICINE
Payer: MEDICAID

## 2021-10-28 VITALS
WEIGHT: 315 LBS | TEMPERATURE: 97.1 F | BODY MASS INDEX: 44.1 KG/M2 | SYSTOLIC BLOOD PRESSURE: 145 MMHG | RESPIRATION RATE: 16 BRPM | OXYGEN SATURATION: 98 % | HEART RATE: 66 BPM | DIASTOLIC BLOOD PRESSURE: 95 MMHG | HEIGHT: 71 IN

## 2021-10-28 DIAGNOSIS — Z20.822 SUSPECTED COVID-19 VIRUS INFECTION: Primary | ICD-10-CM

## 2021-10-28 PROCEDURE — U0003 INFECTIOUS AGENT DETECTION BY NUCLEIC ACID (DNA OR RNA); SEVERE ACUTE RESPIRATORY SYNDROME CORONAVIRUS 2 (SARS-COV-2) (CORONAVIRUS DISEASE [COVID-19]), AMPLIFIED PROBE TECHNIQUE, MAKING USE OF HIGH THROUGHPUT TECHNOLOGIES AS DESCRIBED BY CMS-2020-01-R: HCPCS

## 2021-10-28 PROCEDURE — U0005 INFEC AGEN DETEC AMPLI PROBE: HCPCS

## 2021-10-28 PROCEDURE — 99282 EMERGENCY DEPT VISIT SF MDM: CPT

## 2021-10-28 RX ORDER — BROMPHENIRAMINE MALEATE, PSEUDOEPHEDRINE HYDROCHLORIDE, AND DEXTROMETHORPHAN HYDROBROMIDE 2; 30; 10 MG/5ML; MG/5ML; MG/5ML
5 SYRUP ORAL 4 TIMES DAILY PRN
Qty: 120 ML | Refills: 1 | Status: SHIPPED | OUTPATIENT
Start: 2021-10-28 | End: 2021-11-07

## 2021-10-28 RX ORDER — IBUPROFEN 800 MG/1
800 TABLET ORAL EVERY 8 HOURS PRN
Qty: 21 TABLET | Refills: 0 | Status: SHIPPED | OUTPATIENT
Start: 2021-10-28 | End: 2022-01-10

## 2021-10-28 ASSESSMENT — ENCOUNTER SYMPTOMS
EYE REDNESS: 0
ABDOMINAL PAIN: 0
SINUS PRESSURE: 0
EYE DISCHARGE: 0
DIARRHEA: 0
EYE PAIN: 0
COUGH: 1
VOMITING: 0
NAUSEA: 0
BACK PAIN: 0
SHORTNESS OF BREATH: 0
WHEEZING: 0
SORE THROAT: 0

## 2021-10-28 NOTE — Clinical Note
Dulce Butler was seen and treated in our emergency department on 10/28/2021. He may return to work on 11/07/2021. REMAIN IN ISOLATION (Pending results):  NEGATIVE result:                                                                                                                                                                                     (result in 2-3 days; return to work/school if negative)       OR    POSITIVE result:  \" 10 days from date symptoms first appeared,  \" at least 24 hours with no fever (without the use of fever-reducing medication)   \" AND symptoms improved             If you have any questions or concerns, please don't hesitate to call.       Rosario Smith, DO

## 2021-10-28 NOTE — ED PROVIDER NOTES
The history is provided by the patient. Illness   The current episode started yesterday. The onset was gradual. The problem occurs occasionally. The problem has been unchanged. The problem is mild. Nothing relieves the symptoms. Nothing aggravates the symptoms. Associated symptoms include congestion, cough and URI. Pertinent negatives include no fever, no abdominal pain, no diarrhea, no nausea, no vomiting, no ear pain, no headaches, no sore throat, no wheezing, no rash, no eye discharge, no eye pain and no eye redness. He has been less active. Urine output has been normal. There were sick contacts at home and at work. He has received no recent medical care. Review of Systems   Constitutional: Positive for activity change and fatigue. Negative for chills and fever. HENT: Positive for congestion. Negative for ear pain, sinus pressure and sore throat. Eyes: Negative for pain, discharge and redness. Respiratory: Positive for cough. Negative for shortness of breath and wheezing. Cardiovascular: Negative for chest pain. Gastrointestinal: Negative for abdominal pain, diarrhea, nausea and vomiting. Genitourinary: Negative for dysuria and frequency. Musculoskeletal: Negative for arthralgias and back pain. Skin: Negative for rash and wound. Neurological: Negative for weakness and headaches. Hematological: Negative for adenopathy. Psychiatric/Behavioral: Negative. All other systems reviewed and are negative. Physical Exam  Vitals and nursing note reviewed. Constitutional:       Appearance: He is well-developed. HENT:      Head: Normocephalic and atraumatic. Right Ear: Tympanic membrane normal.      Left Ear: Tympanic membrane normal.      Nose: Congestion and rhinorrhea present. Eyes:      Pupils: Pupils are equal, round, and reactive to light. Cardiovascular:      Rate and Rhythm: Normal rate and regular rhythm. Heart sounds: Normal heart sounds. No murmur heard. Pulmonary:      Effort: Pulmonary effort is normal.      Breath sounds: Normal breath sounds. Abdominal:      General: Bowel sounds are normal.      Palpations: Abdomen is soft. Tenderness: There is no abdominal tenderness. There is no guarding or rebound. Musculoskeletal:      Cervical back: Normal range of motion and neck supple. Skin:     General: Skin is warm and dry. Neurological:      Mental Status: He is alert and oriented to person, place, and time. Psychiatric:         Behavior: Behavior normal.         Thought Content: Thought content normal.         Judgment: Judgment normal.        --------------------------------------------- PAST HISTORY ---------------------------------------------  Past Medical History:  has a past medical history of Allergic rhinitis, Chronic back pain, Depression, and Hypertension. Past Surgical History:  has a past surgical history that includes Abscess Drainage. Social History:  reports that he has been smoking cigarettes. He started smoking about 24 years ago. He has a 7.50 pack-year smoking history. He has never used smokeless tobacco. He reports current alcohol use. He reports that he does not use drugs. Family History: family history is not on file. The patients home medications have been reviewed. Allergies: Patient has no known allergies. -------------------------------------------------- RESULTS -------------------------------------------------  No results found for this visit on 10/28/21. No orders to display       ------------------------- NURSING NOTES AND VITALS REVIEWED ---------------------------   The nursing notes within the ED encounter and vital signs as below have been reviewed.    BP (!) 145/95   Pulse 66   Temp 97.1 °F (36.2 °C) (Temporal)   Resp 16   Ht 5' 11\" (1.803 m)   Wt (!) 340 lb (154.2 kg)   SpO2 98%   BMI 47.42 kg/m²   Oxygen Saturation Interpretation: Normal      ------------------------------------------ PROGRESS NOTES ------------------------------------------   I have spoken with the patient and discussed todays results, in addition to providing specific details for the plan of care and counseling regarding the diagnosis and prognosis. Their questions are answered at this time and they are agreeable with the plan.      --------------------------------- ADDITIONAL PROVIDER NOTES ---------------------------------        This patient is stable for discharge. I have shared the specific conditions for return, as well as the importance of follow-up. IMPRESSION:     1. Suspected COVID-19 virus infection      Patient's Medications   New Prescriptions    BROMPHENIRAMINE-PSEUDOEPHEDRINE-DM 2-30-10 MG/5ML SYRUP    Take 5 mLs by mouth 4 times daily as needed for Congestion or Cough    IBUPROFEN (IBU) 800 MG TABLET    Take 1 tablet by mouth every 8 hours as needed for Pain   Previous Medications    ARIPIPRAZOLE (ABILIFY) 5 MG TABLET    Take 5 mg by mouth nightly    CLONAZEPAM (KLONOPIN) 0.5 MG TABLET    TAKE 1 AND 1/2 TABLETS BY MOUTH DAILY AS NEEDED    ESCITALOPRAM (LEXAPRO) 20 MG TABLET    TAKE 1 TABLET BY MOUTH DAILY    GABAPENTIN (NEURONTIN) 100 MG CAPSULE    Take 100 mg by mouth.  3 tablets q am    HYDROXYZINE (VISTARIL) 50 MG CAPSULE    TAKE 1 CAPSULE BY MOUTH TWICE DAILY AS NEEDED    NAPROXEN (NAPROSYN) 500 MG TABLET    Take 1 tablet by mouth 2 times daily for 7 days    TOPIRAMATE (TOPAMAX) 25 MG TABLET    TAKE 2 TABLETS BY MOUTH AT BEDTIME    TRAZODONE (DESYREL) 150 MG TABLET    Take 2 tablets by mouth nightly    ZOLPIDEM (AMBIEN) 5 MG TABLET    TAKE 1 TABLET BY MOUTH EVERY NIGHT AT BEDTIME AS NEEDED   Modified Medications    No medications on file   Discontinued Medications    No medications on file         Procedures     DONNY Smith DO  10/28/21 [id number]

## 2021-10-29 ENCOUNTER — CARE COORDINATION (OUTPATIENT)
Dept: CARE COORDINATION | Age: 36
End: 2021-10-29

## 2021-10-29 LAB — SARS-COV-2, PCR: NOT DETECTED

## 2021-10-29 NOTE — CARE COORDINATION
Patient contacted regarding COVID-19 risk. Discussed COVID-19 related testing which was pending at this time. Test results were pending. Patient informed of results, if available? Yes. Ambulatory Care Manager contacted the patient by telephone to perform post discharge assessment. Call within 2 business days of discharge: Yes. Verified name and  with patient as identifiers. Provided introduction to self, and explanation of the CTN/ACM role, and reason for call due to risk factors for infection and/or exposure to COVID-19. Symptoms reviewed with patient who verbalized the following symptoms: no new symptoms and no worsening symptoms. Due to no new or worsening symptoms encounter was not routed to provider for escalation. Discussed follow-up appointments. If no appointment was previously scheduled, appointment scheduling offered: Yes. [facility] follow up appointment(s): No future appointments. Non-[facility] follow up appointment(s): n/a    Non-face-to-face services provided:  Obtained and reviewed discharge summary and/or continuity of care documents     Advance Care Planning:   Does patient have an Advance Directive:  reviewed and current. Educated patient about risk for severe COVID-19 due to risk factors according to CDC guidelines. ACM reviewed discharge instructions, medical action plan and red flag symptoms with the patient who verbalized understanding. Discussed COVID vaccination status: Yes. Education provided on COVID-19 vaccination as appropriate. Discussed exposure protocols and quarantine with CDC Guidelines. Patient was given an opportunity to verbalize any questions and concerns and agrees to contact ACM or health care provider for questions related to their healthcare. Reviewed and educated patient on any new and changed medications related to discharge diagnosis     Was patient discharged with a pulse oximeter?  No Discussed and confirmed pulse oximeter discharge instructions and when to notify provider or seek emergency care. ACM provided contact information. No further follow-up call identified based on severity of symptoms and risk factors. Patient understands CDC guidelines and is able to repeat them. Patient verbalizes understanding of suggestions for follow up from ED AVS and has number to do so  Patient has no new or worsening symptoms. Patient wishes no further calls at this time from [facility]. Patient has contact information and encouraged to contact ACM should there be any questions or concerns. Episode to be closed at this time.

## 2021-11-05 ENCOUNTER — HOSPITAL ENCOUNTER (OUTPATIENT)
Age: 36
Discharge: HOME | End: 2021-11-05
Payer: MEDICAID

## 2021-11-05 LAB
AMPHETAMINE SCREEN, URINE: NOT DETECTED
BARBITURATE SCREEN URINE: NOT DETECTED
BENZODIAZEPINE SCREEN, URINE: NOT DETECTED
CANNABINOID SCREEN URINE: POSITIVE
COCAINE METABOLITE SCREEN URINE: NOT DETECTED
FENTANYL SCREEN, URINE: NOT DETECTED
Lab: ABNORMAL
METHADONE SCREEN, URINE: NOT DETECTED
OPIATE SCREEN URINE: NOT DETECTED
OXYCODONE URINE: NOT DETECTED
PHENCYCLIDINE SCREEN URINE: NOT DETECTED

## 2021-11-05 PROCEDURE — 80307 DRUG TEST PRSMV CHEM ANLYZR: CPT

## 2021-11-22 ENCOUNTER — HOSPITAL ENCOUNTER (EMERGENCY)
Age: 36
Discharge: HOME | End: 2021-11-22
Attending: GENERAL PRACTICE
Payer: MEDICAID

## 2021-11-22 VITALS
DIASTOLIC BLOOD PRESSURE: 78 MMHG | HEART RATE: 84 BPM | SYSTOLIC BLOOD PRESSURE: 137 MMHG | RESPIRATION RATE: 20 BRPM | BODY MASS INDEX: 48.82 KG/M2 | TEMPERATURE: 96.9 F | WEIGHT: 315 LBS | OXYGEN SATURATION: 97 %

## 2021-11-22 DIAGNOSIS — J06.9 VIRAL URI WITH COUGH: Primary | ICD-10-CM

## 2021-11-22 DIAGNOSIS — Z20.822 SUSPECTED COVID-19 VIRUS INFECTION: ICD-10-CM

## 2021-11-22 PROCEDURE — U0003 INFECTIOUS AGENT DETECTION BY NUCLEIC ACID (DNA OR RNA); SEVERE ACUTE RESPIRATORY SYNDROME CORONAVIRUS 2 (SARS-COV-2) (CORONAVIRUS DISEASE [COVID-19]), AMPLIFIED PROBE TECHNIQUE, MAKING USE OF HIGH THROUGHPUT TECHNOLOGIES AS DESCRIBED BY CMS-2020-01-R: HCPCS

## 2021-11-22 PROCEDURE — 99283 EMERGENCY DEPT VISIT LOW MDM: CPT

## 2021-11-22 PROCEDURE — U0005 INFEC AGEN DETEC AMPLI PROBE: HCPCS

## 2021-11-22 RX ORDER — NAPROXEN 375 MG/1
375 TABLET ORAL 2 TIMES DAILY WITH MEALS
Qty: 60 TABLET | Refills: 0 | Status: SHIPPED | OUTPATIENT
Start: 2021-11-22 | End: 2022-01-10

## 2021-11-22 RX ORDER — GUAIFENESIN, PSEUDOEPHEDRINE HYDROCHLORIDE 600; 60 MG/1; MG/1
1 TABLET, EXTENDED RELEASE ORAL EVERY 12 HOURS
Qty: 14 TABLET | Refills: 0 | Status: SHIPPED | OUTPATIENT
Start: 2021-11-22 | End: 2021-11-29

## 2021-11-22 RX ORDER — BENZONATATE 100 MG/1
100 CAPSULE ORAL 3 TIMES DAILY PRN
Qty: 21 CAPSULE | Refills: 0 | Status: SHIPPED | OUTPATIENT
Start: 2021-11-22 | End: 2021-11-29

## 2021-11-22 ASSESSMENT — PAIN DESCRIPTION - LOCATION: LOCATION: GENERALIZED

## 2021-11-22 ASSESSMENT — PAIN SCALES - GENERAL
PAINLEVEL_OUTOF10: 8
PAINLEVEL_OUTOF10: 8

## 2021-11-22 ASSESSMENT — PAIN DESCRIPTION - PAIN TYPE: TYPE: ACUTE PAIN

## 2021-11-22 ASSESSMENT — PAIN - FUNCTIONAL ASSESSMENT: PAIN_FUNCTIONAL_ASSESSMENT: 0-10

## 2021-11-22 ASSESSMENT — PAIN DESCRIPTION - PROGRESSION: CLINICAL_PROGRESSION: NOT CHANGED

## 2021-11-22 ASSESSMENT — PAIN DESCRIPTION - DESCRIPTORS: DESCRIPTORS: ACHING

## 2021-11-22 ASSESSMENT — PAIN DESCRIPTION - ONSET: ONSET: GRADUAL

## 2021-11-22 NOTE — ED PROVIDER NOTES
Department of Emergency Medicine   ED  Provider Note  Admit Date/RoomTime: 11/22/2021  1:37 PM  ED Room: 02/02 11/22/21  2:12 PM EST      HPI: Mindi Washington is a 29 y.o. male with a past medical history of  has a past medical history of Allergic rhinitis, Chronic back pain, Depression, and Hypertension. presenting with complaints of a cough with nasal congestion. The patient states that these symptoms began gradually. The history is obtained from the patient. Patient does complain of a mild cough associated with it that is nonproductive. The patient denies any abdominal pain, left upper quadrant fullness, or early satiety. The patient also denies difficulty breathing, hemoptysis, neck pain/stiffness, or blurry vision, or chest pain. Sx have persisted and are mildly worse which is what prompted the visit today. denies exposure to sick contacts. Does endorse body aches, loss of taste and smell        Review of Systems:   Pertinent positives and negatives are stated within HPI, all other systems reviewed and are negative.           --------------------------------------------- PAST HISTORY ---------------------------------------------  Past Medical History:  has a past medical history of Allergic rhinitis, Chronic back pain, Depression, and Hypertension. Past Surgical History:  has a past surgical history that includes Abscess Drainage. Social History:  reports that he has been smoking cigarettes. He started smoking about 24 years ago. He has a 7.50 pack-year smoking history. He has never used smokeless tobacco. He reports current alcohol use. He reports that he does not use drugs. Family History: family history is not on file. The patients home medications have been reviewed. Allergies: Patient has no known allergies. Physical exam:  Constitutional: Vital signs are reviewed the patient is comfortable. The patient is alert and oriented and conversant.   Head: The head is atraumatic and normocephalic. Eyes: No discharge is present from the eyes. The sclera are normal.  Ears: TMs bilaterally demonstrate no erythema, no bulging and no evidence of infection. Mouth: The oropharynx demonstrates a small amount of erythema bilaterally. There is no tonsillar enlargement nor is there any exudate present. No uvular deviation or edema. No tonsillary asymmetry. Floor of the mouth soft, no trismus, handling secretions. Neck: Normal range of motion is achieved in the neck. There is no JVD present. No meningeal signs are present   Anterior cervical adenopathy is present. Respiratory: The chest is nontender. Breath sounds are clear to auscultation bilaterally. No wheezes, rales, or rhonchi. There is no respiratory distress. Cardiovascular: Heart shows a regular rate and rhythm without murmurs, rubs, clicks or gallops. Abdominal exam: The abdomen is non tender without evidence of peritoneal signs. Specific attention to the left upper quadrant with palpation of the spleen demonstrates no organomegaly or tenderness  Skin: warm and dry, without rash  Neurologic: GCS 15   Psych: Normal Affect  -------------------------------------------------- RESULTS -------------------------------------------------    LABS:  No results found for this visit on 11/22/21. RADIOLOGY:  Interpreted by Radiologist.  No orders to display             ------------------------- NURSING NOTES AND VITALS REVIEWED ---------------------------   The nursing notes within the ED encounter and vital signs as below have been reviewed. /78   Pulse 84   Temp 96.9 °F (36.1 °C) (Temporal)   Resp 20   Wt (!) 350 lb (158.8 kg)   SpO2 97%   BMI 48.82 kg/m²   Oxygen Saturation Interpretation: Normal    The patients available past medical records and past encounters were reviewed.         ------------------------------ ED COURSE/MEDICAL DECISION MAKING----------------------  Medications - No data to display          Medical Decision Making:         Upper respiratory infection likely viral in etiology. Covid test pending. Not hypoxic, nothing to suggest pneumonia. Well appearing, non toxic, appropriate for outpatient management. Plan is for symptom management and PCP follow up. This patient's ED course included: a personal history and physicial eaxmination and re-evaluation prior to disposition    This patient has remained hemodynamically stable during their ED course. Counseling: The emergency provider has spoken with the patient and discussed todays results, in addition to providing specific details for the plan of care and counseling regarding the diagnosis and prognosis. Questions are answered at this time and they are agreeable with the plan.       --------------------------------- IMPRESSION AND DISPOSITION ---------------------------------    IMPRESSION  1. Viral URI with cough    2.  Suspected COVID-19 virus infection        DISPOSITION  Disposition: Discharge to home  Patient condition is good           Sabino Kelly,   Resident  11/22/21 [id number]

## 2021-11-22 NOTE — Clinical Note
Steve Culp was seen and treated in our emergency department on 11/22/2021. He may return to work on 11/24/2021. Missy Raya may go to work on November 24, assuming his Covid test is negative. If it is positive, he may return to work 2 weeks from onset of symptoms, on December 3     If you have any questions or concerns, please don't hesitate to call.       Noman  43., DO

## 2021-11-23 ENCOUNTER — CARE COORDINATION (OUTPATIENT)
Dept: CARE COORDINATION | Age: 36
End: 2021-11-23

## 2021-11-23 ENCOUNTER — TELEPHONE (OUTPATIENT)
Dept: FAMILY MEDICINE CLINIC | Age: 36
End: 2021-11-23

## 2021-11-23 LAB — SARS-COV-2, PCR: DETECTED

## 2021-11-23 NOTE — TELEPHONE ENCOUNTER
Patient called c/o cough,chills,body aches loss of taste and smell. Was seen at urgent care,covid test pending ,requesting something better for his cough. Was given tessalon perls,mucinex and naprosyn on discharge.

## 2021-11-23 NOTE — CARE COORDINATION
Patient contacted regarding COVID-19 risk. Discussed COVID-19 related testing which was pending at this time. Test results were pending. Patient informed of results, if available? Yes. Ambulatory Care Manager contacted the patient by telephone to perform post discharge assessment. Call within 2 business days of discharge: Yes. Verified name and  with patient as identifiers. Provided introduction to self, and explanation of the CTN/ACM role, and reason for call due to risk factors for infection and/or exposure to COVID-19. Symptoms reviewed with patient who verbalized the following symptoms: fatigue, pain or aching joints, cough, no new symptoms and no worsening symptoms. Due to no new or worsening symptoms encounter was not routed to provider for escalation. Discussed follow-up appointments. If no appointment was previously scheduled, appointment scheduling offered: Yes. [facility] follow up appointment(s): No future appointments. Non-[facility] follow up appointment(s): n/a    Non-face-to-face services provided:  Obtained and reviewed discharge summary and/or continuity of care documents     Advance Care Planning:   Does patient have an Advance Directive:  reviewed and current. Educated patient about risk for severe COVID-19 due to risk factors according to CDC guidelines. ACM reviewed discharge instructions, medical action plan and red flag symptoms with the patient who verbalized understanding. Discussed COVID vaccination status: Yes. Education provided on COVID-19 vaccination as appropriate. Discussed exposure protocols and quarantine with CDC Guidelines. Patient was given an opportunity to verbalize any questions and concerns and agrees to contact ACM or health care provider for questions related to their healthcare. Reviewed and educated patient on any new and changed medications related to discharge diagnosis     Was patient discharged with a pulse oximeter?  No Discussed and confirmed pulse oximeter discharge instructions and when to notify provider or seek emergency care. ACM provided contact information. No further follow-up call identified based on severity of symptoms and risk factors. Patient understands CDC guidelines and is able to repeat them. Patient verbalizes understanding of suggestions for follow up from ED AVS and has number to do so  Patient has no new or worsening symptoms. Patient wishes no further calls at this time from [place]. Patient has contact information and encouraged to contact ACM should there be any questions or concerns. Episode to be closed at this time.

## 2021-11-24 ENCOUNTER — TELEPHONE (OUTPATIENT)
Dept: FAMILY MEDICINE CLINIC | Age: 36
End: 2021-11-24

## 2021-11-24 ENCOUNTER — HOSPITAL ENCOUNTER (EMERGENCY)
Age: 36
Discharge: LEFT BEFORE DISCHARGE | End: 2021-11-24

## 2021-11-24 VITALS
DIASTOLIC BLOOD PRESSURE: 93 MMHG | RESPIRATION RATE: 18 BRPM | HEART RATE: 94 BPM | OXYGEN SATURATION: 96 % | WEIGHT: 315 LBS | HEIGHT: 71 IN | SYSTOLIC BLOOD PRESSURE: 159 MMHG | BODY MASS INDEX: 44.1 KG/M2 | TEMPERATURE: 98.2 F

## 2021-11-24 DIAGNOSIS — R05.9 COUGH: Primary | ICD-10-CM

## 2021-11-24 RX ORDER — DEXTROMETHORPHAN HYDROBROMIDE AND PROMETHAZINE HYDROCHLORIDE 15; 6.25 MG/5ML; MG/5ML
5 SYRUP ORAL 4 TIMES DAILY PRN
Qty: 140 ML | Refills: 0 | Status: SHIPPED | OUTPATIENT
Start: 2021-11-24 | End: 2021-12-01

## 2021-11-24 ASSESSMENT — PAIN DESCRIPTION - PAIN TYPE: TYPE: ACUTE PAIN

## 2021-11-24 ASSESSMENT — PAIN DESCRIPTION - LOCATION: LOCATION: GENERALIZED

## 2021-11-24 ASSESSMENT — PAIN SCALES - GENERAL: PAINLEVEL_OUTOF10: 9

## 2021-11-25 ENCOUNTER — HOSPITAL ENCOUNTER (EMERGENCY)
Age: 36
Discharge: HOME | End: 2021-11-25
Attending: EMERGENCY MEDICINE
Payer: MEDICAID

## 2021-11-25 ENCOUNTER — APPOINTMENT (OUTPATIENT)
Dept: GENERAL RADIOLOGY | Age: 36
End: 2021-11-25
Payer: MEDICAID

## 2021-11-25 VITALS
SYSTOLIC BLOOD PRESSURE: 149 MMHG | RESPIRATION RATE: 20 BRPM | TEMPERATURE: 97.5 F | OXYGEN SATURATION: 97 % | HEART RATE: 73 BPM | DIASTOLIC BLOOD PRESSURE: 94 MMHG

## 2021-11-25 DIAGNOSIS — U07.1 COVID-19: Primary | ICD-10-CM

## 2021-11-25 LAB
ALBUMIN SERPL-MCNC: 4.4 G/DL (ref 3.5–5.2)
ALP BLD-CCNC: 105 U/L (ref 40–129)
ALT SERPL-CCNC: 65 U/L (ref 0–40)
ANION GAP SERPL CALCULATED.3IONS-SCNC: 12 MMOL/L (ref 7–16)
APTT: 29.3 SEC (ref 24.5–35.1)
AST SERPL-CCNC: 32 U/L (ref 0–39)
BASOPHILS ABSOLUTE: 0.02 E9/L (ref 0–0.2)
BASOPHILS RELATIVE PERCENT: 0.3 % (ref 0–2)
BILIRUB SERPL-MCNC: 0.4 MG/DL (ref 0–1.2)
BUN BLDV-MCNC: 14 MG/DL (ref 6–20)
CALCIUM SERPL-MCNC: 9.2 MG/DL (ref 8.6–10.2)
CHLORIDE BLD-SCNC: 106 MMOL/L (ref 98–107)
CO2: 22 MMOL/L (ref 22–29)
CREAT SERPL-MCNC: 0.8 MG/DL (ref 0.7–1.2)
D DIMER: 212 NG/ML DDU
EOSINOPHILS ABSOLUTE: 0.22 E9/L (ref 0.05–0.5)
EOSINOPHILS RELATIVE PERCENT: 3.4 % (ref 0–6)
GFR AFRICAN AMERICAN: >60
GFR NON-AFRICAN AMERICAN: >60 ML/MIN/1.73
GLUCOSE BLD-MCNC: 144 MG/DL (ref 74–99)
HCT VFR BLD CALC: 51.4 % (ref 37–54)
HEMOGLOBIN: 17 G/DL (ref 12.5–16.5)
IMMATURE GRANULOCYTES #: 0.03 E9/L
IMMATURE GRANULOCYTES %: 0.5 % (ref 0–5)
INR BLD: 1
LYMPHOCYTES ABSOLUTE: 3.59 E9/L (ref 1.5–4)
LYMPHOCYTES RELATIVE PERCENT: 55.7 % (ref 20–42)
MCH RBC QN AUTO: 27.4 PG (ref 26–35)
MCHC RBC AUTO-ENTMCNC: 33.1 % (ref 32–34.5)
MCV RBC AUTO: 82.9 FL (ref 80–99.9)
MONOCYTES ABSOLUTE: 0.46 E9/L (ref 0.1–0.95)
MONOCYTES RELATIVE PERCENT: 7.1 % (ref 2–12)
NEUTROPHILS ABSOLUTE: 2.13 E9/L (ref 1.8–7.3)
NEUTROPHILS RELATIVE PERCENT: 33 % (ref 43–80)
PDW BLD-RTO: 12.8 FL (ref 11.5–15)
PLATELET # BLD: 218 E9/L (ref 130–450)
PMV BLD AUTO: 8.8 FL (ref 7–12)
POTASSIUM REFLEX MAGNESIUM: 4.2 MMOL/L (ref 3.5–5)
PROTHROMBIN TIME: 12.1 SEC (ref 9.3–12.4)
RBC # BLD: 6.2 E12/L (ref 3.8–5.8)
SODIUM BLD-SCNC: 140 MMOL/L (ref 132–146)
TOTAL PROTEIN: 7.5 G/DL (ref 6.4–8.3)
TROPONIN, HIGH SENSITIVITY: 6 NG/L (ref 0–11)
WBC # BLD: 6.5 E9/L (ref 4.5–11.5)

## 2021-11-25 PROCEDURE — 93005 ELECTROCARDIOGRAM TRACING: CPT | Performed by: PHYSICIAN ASSISTANT

## 2021-11-25 PROCEDURE — 96374 THER/PROPH/DIAG INJ IV PUSH: CPT

## 2021-11-25 PROCEDURE — 85730 THROMBOPLASTIN TIME PARTIAL: CPT

## 2021-11-25 PROCEDURE — 36415 COLL VENOUS BLD VENIPUNCTURE: CPT

## 2021-11-25 PROCEDURE — 94640 AIRWAY INHALATION TREATMENT: CPT

## 2021-11-25 PROCEDURE — 2580000003 HC RX 258

## 2021-11-25 PROCEDURE — 85025 COMPLETE CBC W/AUTO DIFF WBC: CPT

## 2021-11-25 PROCEDURE — 84484 ASSAY OF TROPONIN QUANT: CPT

## 2021-11-25 PROCEDURE — 6370000000 HC RX 637 (ALT 250 FOR IP)

## 2021-11-25 PROCEDURE — 71046 X-RAY EXAM CHEST 2 VIEWS: CPT

## 2021-11-25 PROCEDURE — 99283 EMERGENCY DEPT VISIT LOW MDM: CPT

## 2021-11-25 PROCEDURE — 6360000002 HC RX W HCPCS

## 2021-11-25 PROCEDURE — 85610 PROTHROMBIN TIME: CPT

## 2021-11-25 PROCEDURE — 85378 FIBRIN DEGRADE SEMIQUANT: CPT

## 2021-11-25 PROCEDURE — 80053 COMPREHEN METABOLIC PANEL: CPT

## 2021-11-25 RX ORDER — ONDANSETRON 4 MG/1
4 TABLET, ORALLY DISINTEGRATING ORAL 3 TIMES DAILY PRN
Qty: 21 TABLET | Refills: 0 | Status: SHIPPED | OUTPATIENT
Start: 2021-11-25 | End: 2022-01-10

## 2021-11-25 RX ORDER — 0.9 % SODIUM CHLORIDE 0.9 %
1000 INTRAVENOUS SOLUTION INTRAVENOUS ONCE
Status: COMPLETED | OUTPATIENT
Start: 2021-11-25 | End: 2021-11-25

## 2021-11-25 RX ORDER — IPRATROPIUM BROMIDE AND ALBUTEROL SULFATE 2.5; .5 MG/3ML; MG/3ML
1 SOLUTION RESPIRATORY (INHALATION) ONCE
Status: COMPLETED | OUTPATIENT
Start: 2021-11-25 | End: 2021-11-25

## 2021-11-25 RX ORDER — ONDANSETRON 2 MG/ML
4 INJECTION INTRAMUSCULAR; INTRAVENOUS ONCE
Status: COMPLETED | OUTPATIENT
Start: 2021-11-25 | End: 2021-11-25

## 2021-11-25 RX ADMIN — SODIUM CHLORIDE 1000 ML: 9 INJECTION, SOLUTION INTRAVENOUS at 17:17

## 2021-11-25 RX ADMIN — IPRATROPIUM BROMIDE AND ALBUTEROL SULFATE 1 AMPULE: .5; 2.5 SOLUTION RESPIRATORY (INHALATION) at 16:56

## 2021-11-25 RX ADMIN — ONDANSETRON 4 MG: 2 INJECTION INTRAMUSCULAR; INTRAVENOUS at 17:17

## 2021-11-25 ASSESSMENT — ENCOUNTER SYMPTOMS
RHINORRHEA: 1
COUGH: 1
ABDOMINAL PAIN: 0
TROUBLE SWALLOWING: 0
EYE REDNESS: 0
BACK PAIN: 0
WHEEZING: 0
PHOTOPHOBIA: 0
NAUSEA: 1
EYE DISCHARGE: 0
SHORTNESS OF BREATH: 1
CONSTIPATION: 0
VOMITING: 0
DIARRHEA: 1
SORE THROAT: 1

## 2021-11-25 NOTE — ED NOTES
Results of testing explained to patient. Patient verbalizes understanding of instructions regarding testing and need to follow up with PCP and/or specialist provider.         Alice Ramirez RN  11/25/21 [id number]

## 2021-11-25 NOTE — ED PROVIDER NOTES
52-year-old male presenting to emergency department with complaints of worsening shortness of breath, positive for COVID-19 3 days ago. Patient has been sick for 1 week and has sick contacts at home. Patient states that he has been having cough, shortness of breath, chills, chest congestion. Denies any cardiac chest pain, syncope, dizziness, weakness. Patient is above 90% throughout interview, and remains above 90 when ambulated. Patient states that his wife is also sick and is interested in monoclonal antibody therapy like his wife qualified for. Associated fatigue           Review of Systems   Constitutional: Positive for fatigue. Negative for activity change, chills and fever. HENT: Positive for rhinorrhea and sore throat. Negative for congestion and trouble swallowing. Eyes: Negative for photophobia, discharge, redness and visual disturbance. Respiratory: Positive for cough and shortness of breath. Negative for wheezing. Cardiovascular: Negative for chest pain. Gastrointestinal: Positive for diarrhea and nausea. Negative for abdominal pain, constipation and vomiting. Genitourinary: Negative for dysuria, frequency, hematuria and urgency. Musculoskeletal: Negative for back pain, myalgias and neck pain. Skin: Negative for rash and wound. Neurological: Negative for dizziness, syncope, weakness and numbness. Psychiatric/Behavioral: Negative for behavioral problems and confusion. Physical Exam  Vitals reviewed. Constitutional:       General: He is not in acute distress. Appearance: He is well-developed. He is obese. He is not toxic-appearing. Interventions: He is not intubated. HENT:      Head: Normocephalic. Mouth/Throat:      Mouth: Mucous membranes are moist.   Cardiovascular:      Rate and Rhythm: Normal rate and regular rhythm. Pulses: Normal pulses. Heart sounds: No murmur heard. No gallop.     Pulmonary:      Effort: Pulmonary effort is normal. No accessory muscle usage or respiratory distress. He is not intubated. Breath sounds: Wheezing present. No decreased breath sounds, rhonchi or rales. Chest:      Chest wall: No tenderness or edema. Abdominal:      Palpations: Abdomen is soft. Tenderness: There is no abdominal tenderness. There is no guarding or rebound. Musculoskeletal:         General: Normal range of motion. Cervical back: Normal range of motion and neck supple. Right lower leg: No edema. Left lower leg: No edema. Skin:     General: Skin is warm. Capillary Refill: Capillary refill takes less than 2 seconds. Findings: No erythema or rash. Neurological:      Mental Status: He is alert and oriented to person, place, and time. Motor: No weakness. Psychiatric:         Mood and Affect: Mood normal.         Behavior: Behavior normal.          Procedures   EKG: This EKG is signed by emergency department physician. Rate: 75  Rhythm: Sinus  Interpretation: no acute changes  Comparison: was normal, stable as compared to patient's most recent EKG  MDM  Number of Diagnoses or Management Options  COVID-19  Diagnosis management comments: 52-year-old male presenting to emergency department with symptoms of COVID-19, beginning 1 week ago. Positive Covid test 3 days ago at home. Sick contact from girlfriend. Troponin VI with normal sinus rhythm on EKG. Chest x-ray showed no active pathology at this time. Patient was ambulated and remained above 92% throughout, and was able to converse throughout interview remaining above 90%. Zofran 4 mg given, DuoNeb given, fluids given, and upon reassessment patient said he felt greatly improved from previous. Patient qualifies for monoclonal antibody, information faxed. We will plan to discharge patient with follow-up with PCP and return instructions given.  Patient informed to await call tomorrow for infusion clinic and recommend pulse ox purchase with at home monitoring for SPO2 less than 90%. Patient stable at time of discharge       Amount and/or Complexity of Data Reviewed  Clinical lab tests: reviewed  Tests in the radiology section of CPT®: reviewed                      --------------------------------------------- PAST HISTORY ---------------------------------------------  Past Medical History:  has a past medical history of Allergic rhinitis, Chronic back pain, Depression, and Hypertension. Past Surgical History:  has a past surgical history that includes Abscess Drainage. Social History:  reports that he has been smoking cigarettes. He started smoking about 24 years ago. He has a 7.50 pack-year smoking history. He has never used smokeless tobacco. He reports current alcohol use. He reports that he does not use drugs. Family History: family history is not on file. The patients home medications have been reviewed. Allergies: Patient has no known allergies.     -------------------------------------------------- RESULTS -------------------------------------------------  Labs:  Results for orders placed or performed during the hospital encounter of 11/25/21   CBC Auto Differential   Result Value Ref Range    WBC 6.5 4.5 - 11.5 E9/L    RBC 6.20 (H) 3.80 - 5.80 E12/L    Hemoglobin 17.0 (H) 12.5 - 16.5 g/dL    Hematocrit 51.4 37.0 - 54.0 %    MCV 82.9 80.0 - 99.9 fL    MCH 27.4 26.0 - 35.0 pg    MCHC 33.1 32.0 - 34.5 %    RDW 12.8 11.5 - 15.0 fL    Platelets 258 609 - 023 E9/L    MPV 8.8 7.0 - 12.0 fL    Neutrophils % 33.0 (L) 43.0 - 80.0 %    Immature Granulocytes % 0.5 0.0 - 5.0 %    Lymphocytes % 55.7 (H) 20.0 - 42.0 %    Monocytes % 7.1 2.0 - 12.0 %    Eosinophils % 3.4 0.0 - 6.0 %    Basophils % 0.3 0.0 - 2.0 %    Neutrophils Absolute 2.13 1.80 - 7.30 E9/L    Immature Granulocytes # 0.03 E9/L    Lymphocytes Absolute 3.59 1.50 - 4.00 E9/L    Monocytes Absolute 0.46 0.10 - 0.95 E9/L    Eosinophils Absolute 0.22 0.05 - 0.50 E9/L    Basophils Absolute 0.02 0.00 - 0.20 E9/L   Comprehensive Metabolic Panel w/ Reflex to MG   Result Value Ref Range    Sodium 140 132 - 146 mmol/L    Potassium reflex Magnesium 4.2 3.5 - 5.0 mmol/L    Chloride 106 98 - 107 mmol/L    CO2 22 22 - 29 mmol/L    Anion Gap 12 7 - 16 mmol/L    Glucose 144 (H) 74 - 99 mg/dL    BUN 14 6 - 20 mg/dL    CREATININE 0.8 0.7 - 1.2 mg/dL    GFR Non-African American >60 >=60 mL/min/1.73    GFR African American >60     Calcium 9.2 8.6 - 10.2 mg/dL    Total Protein 7.5 6.4 - 8.3 g/dL    Albumin 4.4 3.5 - 5.2 g/dL    Total Bilirubin 0.4 0.0 - 1.2 mg/dL    Alkaline Phosphatase 105 40 - 129 U/L    ALT 65 (H) 0 - 40 U/L    AST 32 0 - 39 U/L   Troponin   Result Value Ref Range    Troponin, High Sensitivity 6 0 - 11 ng/L   D-Dimer, Quantitative   Result Value Ref Range    D-Dimer, Quant 212 ng/mL DDU   Protime-INR   Result Value Ref Range    Protime 12.1 9.3 - 12.4 sec    INR 1.0    APTT   Result Value Ref Range    aPTT 29.3 24.5 - 35.1 sec   EKG 12 Lead   Result Value Ref Range    Ventricular Rate 74 BPM    Atrial Rate 74 BPM    P-R Interval 152 ms    QRS Duration 80 ms    Q-T Interval 400 ms    QTc Calculation (Bazett) 444 ms    P Axis 26 degrees    R Axis 74 degrees    T Axis 41 degrees       Radiology:  XR CHEST (2 VW)   Final Result   No evidence of active cardiopulmonary pathology on this imaging modality.             ------------------------- NURSING NOTES AND VITALS REVIEWED ---------------------------  Date / Time Roomed:  11/25/2021  4:24 PM  ED Bed Assignment:  16/16    The nursing notes within the ED encounter and vital signs as below have been reviewed.    BP (!) 149/94   Pulse 73   Temp 97.5 °F (36.4 °C) (Tympanic)   Resp 20   SpO2 97%   Oxygen Saturation Interpretation: Normal      ------------------------------------------ PROGRESS NOTES ------------------------------------------  I have spoken with the patient and discussed todays results, in addition to providing specific details for the plan of care and counseling regarding the diagnosis and prognosis. Their questions are answered at this time and they are agreeable with the plan. I discussed at length with them reasons for immediate return here for re evaluation. They will followup with primary care by calling their office tomorrow. --------------------------------- ADDITIONAL PROVIDER NOTES ---------------------------------  At this time the patient is without objective evidence of an acute process requiring hospitalization or inpatient management. They have remained hemodynamically stable throughout their entire ED visit and are stable for discharge with outpatient follow-up. The plan has been discussed in detail and they are aware of the specific conditions for emergent return, as well as the importance of follow-up. Discharge Medication List as of 11/25/2021  6:01 PM      START taking these medications    Details   ondansetron (ZOFRAN-ODT) 4 MG disintegrating tablet Take 1 tablet by mouth 3 times daily as needed for Nausea or Vomiting, Disp-21 tablet, R-0Normal             Diagnosis:  1. COVID-19        Disposition:  Patient's disposition: Discharge to home  Patient's condition is stable.          Swetha Aguayo,   Resident  11/26/21 [facility]  Resident  11/26/21 [id number]

## 2021-11-25 NOTE — ED PROVIDER NOTES
ATTENDING PROVIDER ATTESTATION:     Laura Oreilly presented to the emergency department for evaluation of flu like illness and shortness of breath and was initially evaluated by the resident physician. See Original ED Note for H&P and ED course above. I have reviewed and discussed the case, including pertinent history (medical, surgical, family and social) and exam findings with the resident. I have personally performed and/or participated in the history, exam, medical decision making, and procedures and agree with all pertinent clinical information except for any differences as noted below. I was present for all key portions of any procedures performed during the ED course. I have reviewed my findings and recommendations with the assigned resident, the patient, and members of family present at the time of disposition. Symptomatic COVID19 infection, gradual onset over the past few days to 1 week, progressively worsening, moderate in severity. midsternal chest tightness, nonradiating, worse w/ coughing. Associated shortness of breath worse w/ exertion. Not vaccinated for COVID19. Non-toxic appearing, afebrile, hemodynamically stable, and in no acute distress. Breathing comfortably on room air without respiratory distress. Neurovascularly intact throughout. EKG shows normal sinus rhythm, vent rate 74bpm, normal axis and intervals, no acute injury pattern, no clinically significant change compared w/ prior EKG. Labs unrevealing. High sens troponin not c/w ACS. D-dimer wnl with low risk Wells. Improved w/ symptomatic tx. Will DC home w/ supportive care and refer for outpatient antibody infusion treatment. DC home in stable condition. My findings/plan:   1.  COVID-19        New Prescriptions    ONDANSETRON (ZOFRAN-ODT) 4 MG DISINTEGRATING TABLET    Take 1 tablet by mouth 3 times daily as needed for Nausea or Vomiting         MD Abdiel Abrams MD  11/25/21 [id number]

## 2021-11-26 ENCOUNTER — CARE COORDINATION (OUTPATIENT)
Dept: CARE COORDINATION | Age: 36
End: 2021-11-26

## 2021-11-26 PROBLEM — U07.1 COVID-19: Status: ACTIVE | Noted: 2021-11-26

## 2021-11-26 RX ORDER — SODIUM CHLORIDE 9 MG/ML
INJECTION, SOLUTION INTRAVENOUS CONTINUOUS
Status: CANCELLED | OUTPATIENT
Start: 2021-11-26

## 2021-11-26 RX ORDER — ACETAMINOPHEN 325 MG/1
650 TABLET ORAL
Status: CANCELLED | OUTPATIENT
Start: 2021-11-26

## 2021-11-26 RX ORDER — DIPHENHYDRAMINE HYDROCHLORIDE 50 MG/ML
50 INJECTION INTRAMUSCULAR; INTRAVENOUS
Status: CANCELLED | OUTPATIENT
Start: 2021-11-26

## 2021-11-26 RX ORDER — ALBUTEROL SULFATE 90 UG/1
4 AEROSOL, METERED RESPIRATORY (INHALATION) PRN
Status: CANCELLED | OUTPATIENT
Start: 2021-11-26

## 2021-11-26 RX ORDER — EPINEPHRINE 1 MG/ML
0.3 INJECTION, SOLUTION, CONCENTRATE INTRAVENOUS PRN
Status: CANCELLED | OUTPATIENT
Start: 2021-11-26

## 2021-11-26 RX ORDER — ONDANSETRON 2 MG/ML
8 INJECTION INTRAMUSCULAR; INTRAVENOUS
Status: CANCELLED | OUTPATIENT
Start: 2021-11-26

## 2021-11-26 NOTE — CARE COORDINATION
Patient contacted regarding COVID-19 diagnosis. Discussed COVID-19 related testing which was available at this time. Test results were positive. Patient informed of results, if available? Yes. Ambulatory Care Manager contacted the patient by telephone to perform post discharge assessment. Call within 2 business days of discharge: Yes. Verified name and  with patient as identifiers. Provided introduction to self, and explanation of the CTN/ACM role, and reason for call due to risk factors for infection and/or exposure to COVID-19. Symptoms reviewed with patient who verbalized the following symptoms: fatigue, cough, shortness of breath, loss of taste or smell, chills or shaking, no new symptoms, no worsening symptoms and chest congestion. Due to no new or worsening symptoms encounter was not routed to provider for escalation. Discussed follow-up appointments. If no appointment was previously scheduled, appointment scheduling offered: Yes. [facility] follow up appointment(s):   Patient states that he will call the office of Dr. Anderson Keith to schedule a follow up appointment. Future Appointments   Date Time Provider Mima Maldonado   2021 10:30 AM [facility] follow up appointment(s): n/a    Non-face-to-face services provided:  Obtained and reviewed discharge summary and/or continuity of care documents     Advance Care Planning:   Does patient have an Advance Directive:  reviewed and current. Educated patient about risk for severe COVID-19 due to risk factors according to CDC guidelines. ACM reviewed discharge instructions, medical action plan and red flag symptoms with the patient who verbalized understanding. Discussed COVID vaccination status: Yes. Education provided on COVID-19 vaccination as appropriate. Discussed exposure protocols and quarantine with CDC Guidelines.  Patient was given an opportunity to verbalize any questions and concerns and agrees to contact ACM or health care provider for questions related to their healthcare. Reviewed and educated patient on any new and changed medications related to discharge diagnosis     Was patient discharged with a pulse oximeter? No     -Patient denies worsening of any symptoms.  -Patient verbalizes understanding of when it would be of emergent need to return to the ED. -ACM emphasized the importance to follow up with PCP. -Patient verbalized understanding of the CDC guidelines.  -Patient is active with MyChart.  -All questions were answered during this encounter. ACM provided contact information. Plan for follow-up call in 5-7 days based on severity of symptoms and risk factors.

## 2021-11-27 ENCOUNTER — HOSPITAL ENCOUNTER (OUTPATIENT)
Dept: INFUSION THERAPY | Age: 36
Setting detail: INFUSION SERIES
Discharge: HOME | End: 2021-11-27
Payer: MEDICAID

## 2021-11-27 VITALS
TEMPERATURE: 97.7 F | DIASTOLIC BLOOD PRESSURE: 77 MMHG | HEART RATE: 75 BPM | OXYGEN SATURATION: 95 % | SYSTOLIC BLOOD PRESSURE: 108 MMHG | RESPIRATION RATE: 16 BRPM

## 2021-11-27 DIAGNOSIS — U07.1 COVID-19: Primary | ICD-10-CM

## 2021-11-27 LAB
EKG ATRIAL RATE: 74 BPM
EKG P AXIS: 26 DEGREES
EKG P-R INTERVAL: 152 MS
EKG Q-T INTERVAL: 400 MS
EKG QRS DURATION: 80 MS
EKG QTC CALCULATION (BAZETT): 444 MS
EKG R AXIS: 74 DEGREES
EKG T AXIS: 41 DEGREES
EKG VENTRICULAR RATE: 74 BPM

## 2021-11-27 PROCEDURE — 6360000002 HC RX W HCPCS: Performed by: INTERNAL MEDICINE

## 2021-11-27 PROCEDURE — 2580000003 HC RX 258: Performed by: INTERNAL MEDICINE

## 2021-11-27 PROCEDURE — M0243 CASIRIVI AND IMDEVI INFUSION: HCPCS

## 2021-11-27 RX ORDER — ACETAMINOPHEN 325 MG/1
650 TABLET ORAL
Status: CANCELLED | OUTPATIENT
Start: 2021-11-27

## 2021-11-27 RX ORDER — DIPHENHYDRAMINE HYDROCHLORIDE 50 MG/ML
50 INJECTION INTRAMUSCULAR; INTRAVENOUS
Status: CANCELLED | OUTPATIENT
Start: 2021-11-27

## 2021-11-27 RX ORDER — ONDANSETRON 2 MG/ML
8 INJECTION INTRAMUSCULAR; INTRAVENOUS
Status: CANCELLED | OUTPATIENT
Start: 2021-11-27

## 2021-11-27 RX ORDER — HEPARIN SODIUM (PORCINE) LOCK FLUSH IV SOLN 100 UNIT/ML 100 UNIT/ML
500 SOLUTION INTRAVENOUS PRN
Status: DISCONTINUED | OUTPATIENT
Start: 2021-11-27 | End: 2021-11-28 | Stop reason: HOSPADM

## 2021-11-27 RX ORDER — SODIUM CHLORIDE 0.9 % (FLUSH) 0.9 %
5-40 SYRINGE (ML) INJECTION PRN
Status: CANCELLED | OUTPATIENT
Start: 2021-11-27

## 2021-11-27 RX ORDER — HEPARIN SODIUM (PORCINE) LOCK FLUSH IV SOLN 100 UNIT/ML 100 UNIT/ML
500 SOLUTION INTRAVENOUS PRN
Status: CANCELLED | OUTPATIENT
Start: 2021-11-27

## 2021-11-27 RX ORDER — EPINEPHRINE 1 MG/ML
0.3 INJECTION, SOLUTION, CONCENTRATE INTRAVENOUS PRN
Status: CANCELLED | OUTPATIENT
Start: 2021-11-27

## 2021-11-27 RX ORDER — SODIUM CHLORIDE 9 MG/ML
25 INJECTION, SOLUTION INTRAVENOUS PRN
Status: CANCELLED | OUTPATIENT
Start: 2021-11-27

## 2021-11-27 RX ORDER — SODIUM CHLORIDE 0.9 % (FLUSH) 0.9 %
5-40 SYRINGE (ML) INJECTION PRN
Status: DISCONTINUED | OUTPATIENT
Start: 2021-11-27 | End: 2021-11-28 | Stop reason: HOSPADM

## 2021-11-27 RX ORDER — ALBUTEROL SULFATE 90 UG/1
4 AEROSOL, METERED RESPIRATORY (INHALATION) PRN
Status: CANCELLED | OUTPATIENT
Start: 2021-11-27

## 2021-11-27 RX ORDER — SODIUM CHLORIDE 9 MG/ML
INJECTION, SOLUTION INTRAVENOUS CONTINUOUS
Status: CANCELLED | OUTPATIENT
Start: 2021-11-27

## 2021-11-27 RX ORDER — SODIUM CHLORIDE 9 MG/ML
INJECTION, SOLUTION INTRAVENOUS CONTINUOUS
Status: DISCONTINUED | OUTPATIENT
Start: 2021-11-27 | End: 2021-11-28 | Stop reason: HOSPADM

## 2021-11-27 RX ORDER — SODIUM CHLORIDE 9 MG/ML
25 INJECTION, SOLUTION INTRAVENOUS PRN
Status: DISCONTINUED | OUTPATIENT
Start: 2021-11-27 | End: 2021-11-28 | Stop reason: HOSPADM

## 2021-11-27 RX ADMIN — SODIUM CHLORIDE: 9 INJECTION, SOLUTION INTRAVENOUS at 11:31

## 2021-11-27 RX ADMIN — CASIRIVIMAB AND IMDEVIMAB: 600; 600 INJECTION, SOLUTION, CONCENTRATE INTRAVENOUS at 11:30

## 2021-11-27 NOTE — PROGRESS NOTES
Pt given education on RegenKadenze. All questions answered. Pt gives verbal consent to receive medications.

## 2021-11-27 NOTE — PROGRESS NOTES
Pt observed for 1 hour post infusion of RegenCov.  VS are WNL.  Pt given discharge instructions including signs & symptoms of allergic reaction and when to seek emergency medical care. Pt verbalized understanding.  All questions answered.  Pt discharged without issue.

## 2021-11-28 ENCOUNTER — APPOINTMENT (OUTPATIENT)
Dept: GENERAL RADIOLOGY | Age: 36
End: 2021-11-28
Payer: MEDICAID

## 2021-11-28 ENCOUNTER — HOSPITAL ENCOUNTER (EMERGENCY)
Age: 36
Discharge: HOME | End: 2021-11-28
Attending: EMERGENCY MEDICINE
Payer: MEDICAID

## 2021-11-28 VITALS
HEART RATE: 85 BPM | OXYGEN SATURATION: 97 % | TEMPERATURE: 96.8 F | RESPIRATION RATE: 18 BRPM | SYSTOLIC BLOOD PRESSURE: 147 MMHG | DIASTOLIC BLOOD PRESSURE: 98 MMHG

## 2021-11-28 DIAGNOSIS — U07.1 COVID-19 VIRUS INFECTION: Primary | ICD-10-CM

## 2021-11-28 LAB
ALBUMIN SERPL-MCNC: 4.4 G/DL (ref 3.5–5.2)
ALP BLD-CCNC: 109 U/L (ref 40–129)
ALT SERPL-CCNC: 49 U/L (ref 0–40)
ANION GAP SERPL CALCULATED.3IONS-SCNC: 11 MMOL/L (ref 7–16)
AST SERPL-CCNC: 26 U/L (ref 0–39)
BASOPHILS ABSOLUTE: 0.02 E9/L (ref 0–0.2)
BASOPHILS RELATIVE PERCENT: 0.3 % (ref 0–2)
BILIRUB SERPL-MCNC: 0.3 MG/DL (ref 0–1.2)
BUN BLDV-MCNC: 13 MG/DL (ref 6–20)
CALCIUM SERPL-MCNC: 9.1 MG/DL (ref 8.6–10.2)
CHLORIDE BLD-SCNC: 106 MMOL/L (ref 98–107)
CO2: 24 MMOL/L (ref 22–29)
CREAT SERPL-MCNC: 0.8 MG/DL (ref 0.7–1.2)
EOSINOPHILS ABSOLUTE: 0.2 E9/L (ref 0.05–0.5)
EOSINOPHILS RELATIVE PERCENT: 2.5 % (ref 0–6)
GFR AFRICAN AMERICAN: >60
GFR NON-AFRICAN AMERICAN: >60 ML/MIN/1.73
GLUCOSE BLD-MCNC: 135 MG/DL (ref 74–99)
HCT VFR BLD CALC: 51.5 % (ref 37–54)
HEMOGLOBIN: 17.1 G/DL (ref 12.5–16.5)
IMMATURE GRANULOCYTES #: 0.04 E9/L
IMMATURE GRANULOCYTES %: 0.5 % (ref 0–5)
LACTIC ACID: 1.1 MMOL/L (ref 0.5–2.2)
LYMPHOCYTES ABSOLUTE: 3.86 E9/L (ref 1.5–4)
LYMPHOCYTES RELATIVE PERCENT: 48.9 % (ref 20–42)
MCH RBC QN AUTO: 27.6 PG (ref 26–35)
MCHC RBC AUTO-ENTMCNC: 33.2 % (ref 32–34.5)
MCV RBC AUTO: 83.1 FL (ref 80–99.9)
MONOCYTES ABSOLUTE: 0.55 E9/L (ref 0.1–0.95)
MONOCYTES RELATIVE PERCENT: 7 % (ref 2–12)
NEUTROPHILS ABSOLUTE: 3.23 E9/L (ref 1.8–7.3)
NEUTROPHILS RELATIVE PERCENT: 40.8 % (ref 43–80)
PDW BLD-RTO: 12.8 FL (ref 11.5–15)
PLATELET # BLD: 259 E9/L (ref 130–450)
PMV BLD AUTO: 8.8 FL (ref 7–12)
POTASSIUM SERPL-SCNC: 4.1 MMOL/L (ref 3.5–5)
RBC # BLD: 6.2 E12/L (ref 3.8–5.8)
SODIUM BLD-SCNC: 141 MMOL/L (ref 132–146)
TOTAL PROTEIN: 7.6 G/DL (ref 6.4–8.3)
TROPONIN, HIGH SENSITIVITY: 7 NG/L (ref 0–11)
WBC # BLD: 7.9 E9/L (ref 4.5–11.5)

## 2021-11-28 PROCEDURE — 96374 THER/PROPH/DIAG INJ IV PUSH: CPT

## 2021-11-28 PROCEDURE — 80053 COMPREHEN METABOLIC PANEL: CPT

## 2021-11-28 PROCEDURE — 94640 AIRWAY INHALATION TREATMENT: CPT

## 2021-11-28 PROCEDURE — 83605 ASSAY OF LACTIC ACID: CPT

## 2021-11-28 PROCEDURE — 84484 ASSAY OF TROPONIN QUANT: CPT

## 2021-11-28 PROCEDURE — 99284 EMERGENCY DEPT VISIT MOD MDM: CPT

## 2021-11-28 PROCEDURE — 93005 ELECTROCARDIOGRAM TRACING: CPT | Performed by: PHYSICIAN ASSISTANT

## 2021-11-28 PROCEDURE — 2580000003 HC RX 258: Performed by: EMERGENCY MEDICINE

## 2021-11-28 PROCEDURE — 94664 DEMO&/EVAL PT USE INHALER: CPT

## 2021-11-28 PROCEDURE — 2580000003 HC RX 258: Performed by: PHYSICIAN ASSISTANT

## 2021-11-28 PROCEDURE — 6360000002 HC RX W HCPCS: Performed by: PHYSICIAN ASSISTANT

## 2021-11-28 PROCEDURE — 71046 X-RAY EXAM CHEST 2 VIEWS: CPT

## 2021-11-28 PROCEDURE — 6370000000 HC RX 637 (ALT 250 FOR IP): Performed by: EMERGENCY MEDICINE

## 2021-11-28 PROCEDURE — 85025 COMPLETE CBC W/AUTO DIFF WBC: CPT

## 2021-11-28 RX ORDER — 0.9 % SODIUM CHLORIDE 0.9 %
1000 INTRAVENOUS SOLUTION INTRAVENOUS ONCE
Status: COMPLETED | OUTPATIENT
Start: 2021-11-28 | End: 2021-11-28

## 2021-11-28 RX ORDER — IPRATROPIUM BROMIDE AND ALBUTEROL SULFATE 2.5; .5 MG/3ML; MG/3ML
2 SOLUTION RESPIRATORY (INHALATION) ONCE
Status: COMPLETED | OUTPATIENT
Start: 2021-11-28 | End: 2021-11-28

## 2021-11-28 RX ORDER — ALBUTEROL SULFATE 90 UG/1
2 AEROSOL, METERED RESPIRATORY (INHALATION) 4 TIMES DAILY PRN
Qty: 54 G | Refills: 1 | Status: SHIPPED | OUTPATIENT
Start: 2021-11-28 | End: 2022-03-09 | Stop reason: SDUPTHER

## 2021-11-28 RX ORDER — DEXAMETHASONE SODIUM PHOSPHATE 10 MG/ML
10 INJECTION INTRAMUSCULAR; INTRAVENOUS ONCE
Status: COMPLETED | OUTPATIENT
Start: 2021-11-28 | End: 2021-11-28

## 2021-11-28 RX ADMIN — SODIUM CHLORIDE 1000 ML: 9 INJECTION, SOLUTION INTRAVENOUS at 18:34

## 2021-11-28 RX ADMIN — IPRATROPIUM BROMIDE AND ALBUTEROL SULFATE 2 AMPULE: .5; 3 SOLUTION RESPIRATORY (INHALATION) at 18:04

## 2021-11-28 RX ADMIN — SODIUM CHLORIDE 1000 ML: 9 INJECTION, SOLUTION INTRAVENOUS at 18:36

## 2021-11-28 RX ADMIN — DEXAMETHASONE SODIUM PHOSPHATE 10 MG: 10 INJECTION INTRAMUSCULAR; INTRAVENOUS at 18:17

## 2021-11-28 ASSESSMENT — ENCOUNTER SYMPTOMS
TROUBLE SWALLOWING: 0
WHEEZING: 0
CHEST TIGHTNESS: 1
NAUSEA: 1
SORE THROAT: 0
DIARRHEA: 0
RHINORRHEA: 0
SHORTNESS OF BREATH: 1
COUGH: 1
ABDOMINAL PAIN: 0
PHOTOPHOBIA: 0
VOMITING: 0

## 2021-11-28 NOTE — ED PROVIDER NOTES
Presents to the ED for evaluation. Patient developed Covid symptoms last Friday and on last Sunday he was diagnosed with Covid. Symptoms have been persistent since then. Symptoms consist of cough, congestion body aches, and shortness of breath which he describes as chest tightness. Denies any actual chest pain. He is also been having subjective fever and chills. Associated nausea but no emesis. No associated diarrhea. No blood in the stool or urine. Patient was seen and was referred to the monoclonal antibody clinic and had an infusion yesterday. States he woke up this morning and his symptoms were worse. This is what prompted him to come to the ED to be evaluated. His son is also sick at home with similar symptoms. Patient has not been vaccinated against Covid. Patient is a current smoker. Review of Systems   Constitutional: Positive for chills, fatigue and fever. Negative for diaphoresis. HENT: Positive for congestion. Negative for rhinorrhea, sore throat and trouble swallowing. Eyes: Negative for photophobia and visual disturbance. Respiratory: Positive for cough, chest tightness and shortness of breath. Negative for wheezing. Cardiovascular: Negative for chest pain. Gastrointestinal: Positive for nausea. Negative for abdominal pain, diarrhea and vomiting. Genitourinary: Negative for decreased urine volume, difficulty urinating and hematuria. Musculoskeletal: Positive for arthralgias and myalgias. Negative for neck pain and neck stiffness. Skin: Negative for pallor and rash. Neurological: Negative for dizziness, light-headedness, numbness and headaches. Hematological: Does not bruise/bleed easily. Psychiatric/Behavioral: Negative for confusion. All other systems reviewed and are negative. Physical Exam  Vitals and nursing note reviewed. Constitutional:       General: He is not in acute distress. Appearance: He is well-developed. He is obese.  He is ill-appearing. He is not toxic-appearing or diaphoretic. HENT:      Head: Normocephalic and atraumatic. Mouth/Throat:      Pharynx: No pharyngeal swelling. Comments: Dry oral mucosa  Eyes:      Conjunctiva/sclera: Conjunctivae normal.   Cardiovascular:      Rate and Rhythm: Normal rate and regular rhythm. Heart sounds: Normal heart sounds. No murmur heard. Pulmonary:      Effort: Pulmonary effort is normal. No respiratory distress ( No signs of conversational dyspnea, accessory muscle use, or tachypnea. ). Breath sounds: Decreased breath sounds and wheezing (Faint scattered wheezes noted.) present. No rhonchi or rales. Abdominal:      General: Bowel sounds are normal.      Palpations: Abdomen is soft. Tenderness: There is no abdominal tenderness. There is no guarding or rebound. Musculoskeletal:         General: No tenderness or deformity. Cervical back: Normal range of motion and neck supple. Comments: There is no pretibial edema nor calf tenderness bilaterally      Skin:     General: Skin is warm and dry. Coloration: Skin is not cyanotic or pale. Neurological:      Mental Status: He is alert and oriented to person, place, and time. Coordination: Coordination normal.          Procedures     MDM   With a known history of Covid presents to the ED with worsening symptoms. While he was in the ED he had labs assessed. CBC showed no runs of leukopenia or leukocytosis. No evidence of anemia. CMP showed no evidence of electrolyte abnormalities or evidence of renal insufficiency. No liver function. Troponin was 7. This is near his baseline from his previous visit. Chest x-ray showed stable findings. No new cardiopulmonary pathology. Patient is not hypoxic and is safe for discharge home. Return precautions have been given. ED Course as of 11/28/21 2001   [place] Nov 28, 2021   2992 Patient resting in bed no distress.   States that he feels better after IV fluids. Discussed results of labs and imaging with him. He is comfortably discharged home. He will be given a prescription for albuterol inhaler. He does have a pulse oximeter now and have advised him to check his pulse ox 3 times a day or if he feels short of breath. If it drops to 88% he should come to the ED to be further evaluated. He understands if he has new concerns or worsening symptoms he can also come to the ED to be evaluated. [MS]      ED Course User Index  [MS] Ema Avilez, DO       --------------------------------------------- PAST HISTORY ---------------------------------------------  Past Medical History:  has a past medical history of Allergic rhinitis, Chronic back pain, Depression, and Hypertension. Past Surgical History:  has a past surgical history that includes Abscess Drainage. Social History:  reports that he has been smoking cigarettes. He started smoking about 24 years ago. He has a 7.50 pack-year smoking history. He has never used smokeless tobacco. He reports current alcohol use. He reports that he does not use drugs. Family History: family history is not on file. The patients home medications have been reviewed. Allergies: Patient has no known allergies.     -------------------------------------------------- RESULTS -------------------------------------------------  Labs:  Results for orders placed or performed during the hospital encounter of 11/28/21   CBC Auto Differential   Result Value Ref Range    WBC 7.9 4.5 - 11.5 E9/L    RBC 6.20 (H) 3.80 - 5.80 E12/L    Hemoglobin 17.1 (H) 12.5 - 16.5 g/dL    Hematocrit 51.5 37.0 - 54.0 %    MCV 83.1 80.0 - 99.9 fL    MCH 27.6 26.0 - 35.0 pg    MCHC 33.2 32.0 - 34.5 %    RDW 12.8 11.5 - 15.0 fL    Platelets 236 097 - 259 E9/L    MPV 8.8 7.0 - 12.0 fL    Neutrophils % 40.8 (L) 43.0 - 80.0 %    Immature Granulocytes % 0.5 0.0 - 5.0 %    Lymphocytes % 48.9 (H) 20.0 - 42.0 %    Monocytes % 7.0 2.0 - 12.0 %    Eosinophils % 2.5 0.0 - 6.0 %    Basophils % 0.3 0.0 - 2.0 %    Neutrophils Absolute 3.23 1.80 - 7.30 E9/L    Immature Granulocytes # 0.04 E9/L    Lymphocytes Absolute 3.86 1.50 - 4.00 E9/L    Monocytes Absolute 0.55 0.10 - 0.95 E9/L    Eosinophils Absolute 0.20 0.05 - 0.50 E9/L    Basophils Absolute 0.02 0.00 - 0.20 E9/L   Comprehensive Metabolic Panel   Result Value Ref Range    Sodium 141 132 - 146 mmol/L    Potassium 4.1 3.5 - 5.0 mmol/L    Chloride 106 98 - 107 mmol/L    CO2 24 22 - 29 mmol/L    Anion Gap 11 7 - 16 mmol/L    Glucose 135 (H) 74 - 99 mg/dL    BUN 13 6 - 20 mg/dL    CREATININE 0.8 0.7 - 1.2 mg/dL    GFR Non-African American >60 >=60 mL/min/1.73    GFR African American >60     Calcium 9.1 8.6 - 10.2 mg/dL    Total Protein 7.6 6.4 - 8.3 g/dL    Albumin 4.4 3.5 - 5.2 g/dL    Total Bilirubin 0.3 0.0 - 1.2 mg/dL    Alkaline Phosphatase 109 40 - 129 U/L    ALT 49 (H) 0 - 40 U/L    AST 26 0 - 39 U/L   Troponin   Result Value Ref Range    Troponin, High Sensitivity 7 0 - 11 ng/L   Lactic Acid, Plasma   Result Value Ref Range    Lactic Acid 1.1 0.5 - 2.2 mmol/L       Radiology:  XR CHEST (2 VW)   Final Result   Stable chest series. No new cardiopulmonary pathology on this exam.           EKG Interpretation    Interpreted by emergency department physician    Rhythm: normal sinus   Rate: normal  Axis: normal  Ectopy: none  Conduction: normal  ST Segments: no acute change  T Waves: no acute change  Q Waves: none    Clinical Impression: no acute changes    Kahlil Sanabria DO     ------------------------- NURSING NOTES AND VITALS REVIEWED ---------------------------  Date / Time Roomed:  11/28/2021  5:18 PM  ED Bed Assignment:  16/16    The nursing notes within the ED encounter and vital signs as below have been reviewed.    BP (!) 147/98   Pulse 85   Temp 96.8 °F (36 °C) (Infrared)   Resp 18   SpO2 97%   Oxygen Saturation Interpretation: Normal      ------------------------------------------ PROGRESS NOTES ------------------------------------------  I have spoken with the patient and discussed todays results, in addition to providing specific details for the plan of care and counseling regarding the diagnosis and prognosis. Their questions are answered at this time and they are agreeable with the plan. I discussed at length with them reasons for immediate return here for re evaluation. They will followup with primary care by calling their office tomorrow. --------------------------------- ADDITIONAL PROVIDER NOTES ---------------------------------  At this time the patient is without objective evidence of an acute process requiring hospitalization or inpatient management. They have remained hemodynamically stable throughout their entire ED visit and are stable for discharge with outpatient follow-up. The plan has been discussed in detail and they are aware of the specific conditions for emergent return, as well as the importance of follow-up. New Prescriptions    ALBUTEROL SULFATE HFA (VENTOLIN HFA) 108 (90 BASE) MCG/ACT INHALER    Inhale 2 puffs into the lungs 4 times daily as needed for Wheezing       Diagnosis:  1. COVID-19 virus infection        Disposition:  Patient's disposition: Discharge to home  Patient's condition is stable.            Arthur White DO  11/28/21 2001       Arthur White DO  11/28/21 2011

## 2021-11-28 NOTE — ED NOTES
FIRST PROVIDER CONTACT ASSESSMENT NOTE           Department of Emergency Medicine                 First Provider Note            21  4:36 PM EST    Date of Encounter: No admission date for patient encounter. Patient Name: Elvia Stewart  : 1985  MRN: [id number]    Chief Complaint: Positive For Covid-19 (had monoclonal antibody infusion yesterday) and Shortness of Breath (feels like he needs breathing treatment)      History of Present Illness:   Elvia Stewart is a 29 y.o. male who presents to the ED for SOB. Diagnosed with covid 21. Also c/o myalgia, loss of taste, loss of smell, headache, and fatigue. Focused Physical Exam:  VS:    ED Triage Vitals   BP Temp Temp Source Pulse Resp SpO2 Height Weight   21 1633 21 1557 21 1557 21 1557 21 1633 21 1557 -- --   (!) 147/98 96.8 °F (36 °C) Infrared 111 18 97 %          Physical Ex: Constitutional: Alert and non-toxic. Medical History:  has a past medical history of Allergic rhinitis, Chronic back pain, Depression, and Hypertension. Surgical History:  has a past surgical history that includes Abscess Drainage. Social History:  reports that he has been smoking cigarettes. He started smoking about 24 years ago. He has a 7.50 pack-year smoking history. He has never used smokeless tobacco. He reports current alcohol use. He reports that he does not use drugs. Family History: family history is not on file. Allergies: Patient has no known allergies.      Initial Plan of Care: Initiate Treatment-Testing, Proceed toTreatment Area When Bed Available for ED Attending/MLP to Continue Care      ---END OF FIRST PROVIDER CONTACT ASSESSMENT NOTE---  Electronically signed by Monique Alfred PA-C   DD: 21         Monique Alfred PA-C  [phone]

## 2021-11-29 ENCOUNTER — CARE COORDINATION (OUTPATIENT)
Dept: CARE COORDINATION | Age: 36
End: 2021-11-29

## 2021-11-29 NOTE — CARE COORDINATION
Patient contacted regarding COVID-19 diagnosis, pulse oximeter ordered at discharge and monoclonal antibody infusion follow up. Discussed COVID-19 related testing which was available at this time. Test results were positive. Patient informed of results, if available? Yes    Ambulatory Care Manager contacted the patient by telephone to perform follow-up assessment. Verified name and  with patient as identifiers. Patient has following risk factors of: hypertension. Symptoms reviewed with patient who verbalized the following symptoms: cough, shortness of breath, no new symptoms and no worsening symptoms. Due to no new or worsening symptoms encounter was not routed to provider for escalation. Educated patient about risk for severe COVID-19 due to risk factors according to CDC guidelines. ACM reviewed discharge instructions, medical action plan and red flag symptoms with the patient who verbalized understanding. Discussed COVID vaccination status: Yes. Education provided on COVID-19 vaccination as appropriate. Discussed exposure protocols and quarantine with CDC Guidelines. Patient was given an opportunity to verbalize any questions and concerns and agrees to contact ACM or health care provider for questions related to their healthcare. Was patient discharged with a pulse oximeter? Yes Discussed and confirmed pulse oximeter discharge instructions and when to notify provider or seek emergency care. Patient verbalizes understanding on how to properly use the pulse oximeter. Patient reports that his most recent reading today was 94% on room air.    -Patient denies worsening of any symptoms. Patient states that he is \"feeling a little better today. \"  -Patient verbalizes understanding of when it would be of emergent need to return to the ED. -Reviewed medications ordered by the ED provider. Patient states he is using the Albuterol as prescribed.     -ACM emphasized the importance to follow up with PCP.  -Patient verbalized understanding of the CDC guidelines.  -Patient is active with MyChart.  -All questions were answered during this encounter. ACM provided contact information. No further follow-up call identified based on severity of symptoms and risk factors.

## 2021-12-01 LAB
EKG ATRIAL RATE: 71 BPM
EKG P AXIS: 21 DEGREES
EKG P-R INTERVAL: 156 MS
EKG Q-T INTERVAL: 376 MS
EKG QRS DURATION: 90 MS
EKG QTC CALCULATION (BAZETT): 408 MS
EKG R AXIS: 73 DEGREES
EKG T AXIS: 32 DEGREES
EKG VENTRICULAR RATE: 71 BPM

## 2021-12-22 DIAGNOSIS — I10 ESSENTIAL HYPERTENSION: ICD-10-CM

## 2021-12-22 RX ORDER — AMLODIPINE BESYLATE 10 MG/1
TABLET ORAL
Qty: 90 TABLET | Refills: 3 | OUTPATIENT
Start: 2021-12-22

## 2021-12-22 RX ORDER — HYDROCHLOROTHIAZIDE 12.5 MG/1
TABLET ORAL
Qty: 90 TABLET | Refills: 3 | OUTPATIENT
Start: 2021-12-22

## 2022-02-18 ENCOUNTER — OFFICE VISIT (OUTPATIENT)
Dept: FAMILY MEDICINE CLINIC | Age: 37
End: 2022-02-18
Payer: MEDICAID

## 2022-02-18 ENCOUNTER — NURSE TRIAGE (OUTPATIENT)
Dept: OTHER | Facility: CLINIC | Age: 37
End: 2022-02-18

## 2022-02-18 VITALS
TEMPERATURE: 98 F | HEART RATE: 90 BPM | WEIGHT: 315 LBS | HEIGHT: 71 IN | DIASTOLIC BLOOD PRESSURE: 77 MMHG | OXYGEN SATURATION: 96 % | BODY MASS INDEX: 44.1 KG/M2 | SYSTOLIC BLOOD PRESSURE: 133 MMHG

## 2022-02-18 DIAGNOSIS — F39 MOOD DISORDER (HCC): ICD-10-CM

## 2022-02-18 DIAGNOSIS — Z79.4 TYPE 2 DIABETES MELLITUS WITHOUT COMPLICATION, WITH LONG-TERM CURRENT USE OF INSULIN (HCC): Primary | ICD-10-CM

## 2022-02-18 DIAGNOSIS — E11.9 TYPE 2 DIABETES MELLITUS WITHOUT COMPLICATION, WITH LONG-TERM CURRENT USE OF INSULIN (HCC): Primary | ICD-10-CM

## 2022-02-18 PROCEDURE — G8427 DOCREV CUR MEDS BY ELIG CLIN: HCPCS | Performed by: INTERNAL MEDICINE

## 2022-02-18 PROCEDURE — G8417 CALC BMI ABV UP PARAM F/U: HCPCS | Performed by: INTERNAL MEDICINE

## 2022-02-18 PROCEDURE — 3046F HEMOGLOBIN A1C LEVEL >9.0%: CPT | Performed by: INTERNAL MEDICINE

## 2022-02-18 PROCEDURE — G8484 FLU IMMUNIZE NO ADMIN: HCPCS | Performed by: INTERNAL MEDICINE

## 2022-02-18 PROCEDURE — 99214 OFFICE O/P EST MOD 30 MIN: CPT | Performed by: INTERNAL MEDICINE

## 2022-02-18 PROCEDURE — 4004F PT TOBACCO SCREEN RCVD TLK: CPT | Performed by: INTERNAL MEDICINE

## 2022-02-18 PROCEDURE — 2022F DILAT RTA XM EVC RTNOPTHY: CPT | Performed by: INTERNAL MEDICINE

## 2022-02-18 RX ORDER — GLIMEPIRIDE 2 MG/1
2 TABLET ORAL
Qty: 90 TABLET | Refills: 0 | Status: SHIPPED | OUTPATIENT
Start: 2022-02-18

## 2022-02-18 RX ORDER — GLUCOSAMINE HCL/CHONDROITIN SU 500-400 MG
CAPSULE ORAL
Qty: 100 STRIP | Refills: 1 | Status: SHIPPED
Start: 2022-02-18 | End: 2022-04-11 | Stop reason: SDUPTHER

## 2022-02-18 RX ORDER — INSULIN GLARGINE 100 [IU]/ML
10 INJECTION, SOLUTION SUBCUTANEOUS NIGHTLY
Qty: 5 PEN | Refills: 0 | Status: SHIPPED
Start: 2022-02-18 | End: 2022-03-09 | Stop reason: SDUPTHER

## 2022-02-18 SDOH — ECONOMIC STABILITY: FOOD INSECURITY: WITHIN THE PAST 12 MONTHS, YOU WORRIED THAT YOUR FOOD WOULD RUN OUT BEFORE YOU GOT MONEY TO BUY MORE.: NEVER TRUE

## 2022-02-18 SDOH — ECONOMIC STABILITY: FOOD INSECURITY: WITHIN THE PAST 12 MONTHS, THE FOOD YOU BOUGHT JUST DIDN'T LAST AND YOU DIDN'T HAVE MONEY TO GET MORE.: NEVER TRUE

## 2022-02-18 ASSESSMENT — ENCOUNTER SYMPTOMS
NAUSEA: 0
SHORTNESS OF BREATH: 0
BLOOD IN STOOL: 0
EYE DISCHARGE: 0
ABDOMINAL PAIN: 0

## 2022-02-18 ASSESSMENT — PATIENT HEALTH QUESTIONNAIRE - PHQ9
2. FEELING DOWN, DEPRESSED OR HOPELESS: 0
6. FEELING BAD ABOUT YOURSELF - OR THAT YOU ARE A FAILURE OR HAVE LET YOURSELF OR YOUR FAMILY DOWN: 0
3. TROUBLE FALLING OR STAYING ASLEEP: 3
SUM OF ALL RESPONSES TO PHQ9 QUESTIONS 1 & 2: 0
5. POOR APPETITE OR OVEREATING: 3
SUM OF ALL RESPONSES TO PHQ QUESTIONS 1-9: 9
4. FEELING TIRED OR HAVING LITTLE ENERGY: 3
10. IF YOU CHECKED OFF ANY PROBLEMS, HOW DIFFICULT HAVE THESE PROBLEMS MADE IT FOR YOU TO DO YOUR WORK, TAKE CARE OF THINGS AT HOME, OR GET ALONG WITH OTHER PEOPLE: 0
7. TROUBLE CONCENTRATING ON THINGS, SUCH AS READING THE NEWSPAPER OR WATCHING TELEVISION: 0
8. MOVING OR SPEAKING SO SLOWLY THAT OTHER PEOPLE COULD HAVE NOTICED. OR THE OPPOSITE, BEING SO FIGETY OR RESTLESS THAT YOU HAVE BEEN MOVING AROUND A LOT MORE THAN USUAL: 0
9. THOUGHTS THAT YOU WOULD BE BETTER OFF DEAD, OR OF HURTING YOURSELF: 0
SUM OF ALL RESPONSES TO PHQ QUESTIONS 1-9: 9
1. LITTLE INTEREST OR PLEASURE IN DOING THINGS: 0

## 2022-02-18 ASSESSMENT — SOCIAL DETERMINANTS OF HEALTH (SDOH): HOW HARD IS IT FOR YOU TO PAY FOR THE VERY BASICS LIKE FOOD, HOUSING, MEDICAL CARE, AND HEATING?: NOT HARD AT ALL

## 2022-02-18 NOTE — TELEPHONE ENCOUNTER
Received call from Xavier Marti at [facility] with [employer]. Subjective: Caller states \"I want to get my PCP to check my BG. I am not a diagnosed diabetic but I got a monitor to check my sugar and it has been: 351, 391, 401. \"     Current Symptoms: dry mouth, frequent urination, incontinence at night, high blood glucose. Onset: having symptoms for months, getting worse so he bought a glucometer. Pain Severity: stomach pains: 8/10 only when coughing    Temperature: none     What has been tried: glucometer     Recommended disposition: be seen today    Care advice provided, patient verbalizes understanding; denies any other questions or concerns; instructed to call back for any new or worsening symptoms. Patient/Caller agrees with recommended disposition; writer provided warm transfer to [place] at [facility] for appointment scheduling     Attention Provider: Thank you for allowing me to participate in the care of your patient. The patient was connected to triage in response to information provided to the ECC/PSC. Please do not respond through this encounter as the response is not directed to a shared pool.       Reason for Disposition   New-onset diabetes mellitus suspected (e.g., frequent urination, weak, weight loss)    Protocols used: DIABETES - HIGH BLOOD SUGAR-ADULT-OH

## 2022-02-18 NOTE — PROGRESS NOTES
Chief Complaint   Patient presents with    Hypertension    Other     took sugar at home today it was 471 just not feeling right he did not get labs that were ordered         HPI:  Patient is here for follow-up     Pt says his BG are high for last one month       Polyuria      Allergy and Medications are reviewed and updated. Past Medical History, Surgical History, and Family History has been reviewed and updated. Review of Systems:  Review of Systems   Constitutional: Negative for chills and fever. HENT: Negative for congestion and ear pain. Eyes: Negative for discharge. Respiratory: Negative for shortness of breath (No new SOB). Cardiovascular: Negative for chest pain and leg swelling. Gastrointestinal: Negative for abdominal pain, blood in stool and nausea. Endocrine: Negative for polydipsia. Genitourinary: Negative for flank pain and hematuria. Musculoskeletal: Negative for myalgias and neck pain. Skin: Negative for rash. Neurological: Negative for dizziness and seizures. Hematological: Does not bruise/bleed easily. Psychiatric/Behavioral: Negative for hallucinations and suicidal ideas. Vitals:    02/18/22 1612   BP: 133/77   Position: Sitting   Pulse: 90   Temp: 98 °F (36.7 °C)   TempSrc: Temporal   SpO2: 96%   Weight: (!) 333 lb 9.6 oz (151.3 kg)   Height: 5' 11\" (1.803 m)       Physical Exam  Vitals reviewed. Constitutional:       Appearance: He is well-developed. HENT:      Head: Normocephalic and atraumatic. Eyes:      Conjunctiva/sclera: Conjunctivae normal.      Pupils: Pupils are equal, round, and reactive to light. Neck:      Vascular: No JVD. Cardiovascular:      Rate and Rhythm: Normal rate and regular rhythm. Pulmonary:      Effort: Pulmonary effort is normal.      Breath sounds: Normal breath sounds. No rales. Abdominal:      General: Bowel sounds are normal.      Palpations: Abdomen is soft.    Musculoskeletal:         General: Normal range of motion. Lymphadenopathy:      Cervical: No cervical adenopathy. Skin:     General: Skin is warm and dry. Neurological:      Mental Status: He is alert and oriented to person, place, and time. Psychiatric:         Behavior: Behavior normal.          Labs :    Lab Results   Component Value Date    WBC 7.9 11/28/2021    HGB 17.1 (H) 11/28/2021    HCT 51.5 11/28/2021     11/28/2021    CHOL 178 12/29/2015    TRIG 92 12/29/2015    HDL 46 12/29/2015    ALT 49 (H) 11/28/2021    AST 26 11/28/2021     11/28/2021    K 4.1 11/28/2021     11/28/2021    CREATININE 0.8 11/28/2021    BUN 13 11/28/2021    CO2 24 11/28/2021    TSH 2.050 12/29/2015    INR 1.0 11/25/2021    LABA1C 5.8 02/25/2021     Lab Results   Component Value Date    COLORU Yellow 09/04/2021    NITRU Negative 09/04/2021    GLUCOSEU Negative 09/04/2021    KETUA Negative 09/04/2021    UROBILINOGEN 0.2 09/04/2021    BILIRUBINUR Negative 09/04/2021             ASSESSMENT     Patient Active Problem List    Diagnosis Date Noted    COVID-19 11/26/2021     Overview Note:     Diagnosis added to problem list by Marijean Sever, [facility] based on transcribed order from Dr. Leonardo Gould Prediabetes 02/25/2021    Nausea and vomiting 02/25/2021    Essential hypertension 01/20/2021    Class 3 severe obesity due to excess calories with body mass index (BMI) of 45.0 to 49.9 in adult [facility]) 01/20/2021    Abdominal pain 09/06/2016    Peptic disease 92/58/5103    Biliary colic 82/08/4621    Acute nasopharyngitis 01/08/2016    Contact dermatitis and eczema due to detergents 01/08/2016    Tobacco abuse 12/07/2015    Midline low back pain without sciatica 12/07/2015    Encounter for long-term (current) use of medications 12/07/2015    Chronic fatigue 12/07/2015    Anxiety 12/07/2015        Diagnosis:     ICD-10-CM    1.  Type 2 diabetes mellitus without complication, with long-term current use of insulin (HCC)  E11.9 CBC with Auto Differential    Z79.4 Comprehensive Metabolic Panel, Fasting     Lipid, Fasting     TSH     Urinalysis with Microscopic     Hemoglobin A1C     Microalbumin / Creatinine Urine Ratio     insulin glargine (LANTUS SOLOSTAR) 100 UNIT/ML injection pen     blood glucose monitor strips     glimepiride (AMARYL) 2 MG tablet   2. Mood disorder ([facility] [address]  F39        PLAN:       Patient advised to maintain blood sugar diary twice a day and bring it with every visit for review    Pt is stable on current medical treatment. Continue current treatment plan    Side effects/Adverse effects/Precautions are reviewed with patient. Low salt, Low Carb diet an low fat diet  Continue medications as advised and take them regularly  Regular exercises as advised    Discussed natural and expected course of this diagnosis and need to alert me if symptoms do not follow expected course, or if any worse. Smoking cessation if applicable, discussed with patient. Add lantus 10 units at night  Amaryl 2 mg in AM     Patient Instructions   The medication list included in this document is our record of what you are currently taking, including any changes that were made at today's visit.  If you find any differences when compared to your medications at home, or have any questions that were not answered at your visit, please contact the office. Patient advised to maintain blood sugar diary twice a day and bring it with every visit for review    These are your blood sugar and A1c goals:  · Blood sugar fastin mg/dl to 130 mg/dl  · Blood sugar before meals: <150 mg/dl  · Peak blood sugar lower than 180 mg/dl  · A1c: between 6.5 - 7.5%          Return in about 2 weeks (around 3/4/2022).

## 2022-02-18 NOTE — LETTER
[address] [address]  Phone: [phone]  Fax: [phone]    Raul Hoover MD        February 18, 2022     Patient: Ishmael Núñez   YOB: 1985   Date of Visit: 2/18/2022       To Whom it May Concern:    Grace Javed was seen in my clinic on 2/18/2022. If you have any questions or concerns, please don't hesitate to call.     Sincerely,         Raul Hoover MD

## 2022-02-18 NOTE — PATIENT INSTRUCTIONS
The medication list included in this document is our record of what you are currently taking, including any changes that were made at today's visit.  If you find any differences when compared to your medications at home, or have any questions that were not answered at your visit, please contact the office.     Patient advised to maintain blood sugar diary twice a day and bring it with every visit for review    These are your blood sugar and A1c goals:  · Blood sugar fastin mg/dl to 130 mg/dl  · Blood sugar before meals: <150 mg/dl  · Peak blood sugar lower than 180 mg/dl  · A1c: between 6.5 - 7.5%

## 2022-02-19 ENCOUNTER — HOSPITAL ENCOUNTER (OUTPATIENT)
Age: 37
Discharge: HOME | End: 2022-02-19
Payer: MEDICAID

## 2022-02-19 DIAGNOSIS — Z79.4 TYPE 2 DIABETES MELLITUS WITHOUT COMPLICATION, WITH LONG-TERM CURRENT USE OF INSULIN (HCC): ICD-10-CM

## 2022-02-19 DIAGNOSIS — E11.9 TYPE 2 DIABETES MELLITUS WITHOUT COMPLICATION, WITH LONG-TERM CURRENT USE OF INSULIN (HCC): ICD-10-CM

## 2022-02-19 LAB
ALBUMIN SERPL-MCNC: 4.5 G/DL (ref 3.5–5.2)
ALP BLD-CCNC: 144 U/L (ref 40–129)
ALT SERPL-CCNC: 156 U/L (ref 0–40)
ANION GAP SERPL CALCULATED.3IONS-SCNC: 11 MMOL/L (ref 7–16)
AST SERPL-CCNC: 73 U/L (ref 0–39)
BACTERIA: ABNORMAL /HPF
BASOPHILS ABSOLUTE: 0.03 E9/L (ref 0–0.2)
BASOPHILS RELATIVE PERCENT: 0.4 % (ref 0–2)
BILIRUB SERPL-MCNC: 0.5 MG/DL (ref 0–1.2)
BILIRUBIN URINE: NEGATIVE
BLOOD, URINE: ABNORMAL
BUN BLDV-MCNC: 11 MG/DL (ref 6–20)
CALCIUM SERPL-MCNC: 9.5 MG/DL (ref 8.6–10.2)
CHLORIDE BLD-SCNC: 95 MMOL/L (ref 98–107)
CHOLESTEROL, FASTING: 166 MG/DL (ref 0–199)
CLARITY: CLEAR
CO2: 27 MMOL/L (ref 22–29)
COLOR: YELLOW
CREAT SERPL-MCNC: 0.9 MG/DL (ref 0.7–1.2)
CREATININE URINE: 121 MG/DL (ref 40–278)
EOSINOPHILS ABSOLUTE: 0.24 E9/L (ref 0.05–0.5)
EOSINOPHILS RELATIVE PERCENT: 3 % (ref 0–6)
GFR AFRICAN AMERICAN: >60
GFR NON-AFRICAN AMERICAN: >60 ML/MIN/1.73
GLUCOSE FASTING: 316 MG/DL (ref 74–99)
GLUCOSE URINE: >=1000 MG/DL
HBA1C MFR BLD: 10.5 % (ref 4–5.6)
HCT VFR BLD CALC: 49.2 % (ref 37–54)
HDLC SERPL-MCNC: 23 MG/DL
HEMOGLOBIN: 16.6 G/DL (ref 12.5–16.5)
IMMATURE GRANULOCYTES #: 0.04 E9/L
IMMATURE GRANULOCYTES %: 0.5 % (ref 0–5)
KETONES, URINE: NEGATIVE MG/DL
LDL CHOLESTEROL CALCULATED: 88 MG/DL (ref 0–99)
LEUKOCYTE ESTERASE, URINE: NEGATIVE
LYMPHOCYTES ABSOLUTE: 3.6 E9/L (ref 1.5–4)
LYMPHOCYTES RELATIVE PERCENT: 45.6 % (ref 20–42)
MCH RBC QN AUTO: 27.7 PG (ref 26–35)
MCHC RBC AUTO-ENTMCNC: 33.7 % (ref 32–34.5)
MCV RBC AUTO: 82 FL (ref 80–99.9)
MICROALBUMIN UR-MCNC: 79.5 MG/L
MICROALBUMIN/CREAT UR-RTO: 65.7 (ref 0–30)
MONOCYTES ABSOLUTE: 0.55 E9/L (ref 0.1–0.95)
MONOCYTES RELATIVE PERCENT: 7 % (ref 2–12)
NEUTROPHILS ABSOLUTE: 3.43 E9/L (ref 1.8–7.3)
NEUTROPHILS RELATIVE PERCENT: 43.5 % (ref 43–80)
NITRITE, URINE: NEGATIVE
PDW BLD-RTO: 12.1 FL (ref 11.5–15)
PH UA: 6 (ref 5–9)
PLATELET # BLD: 273 E9/L (ref 130–450)
PMV BLD AUTO: 9 FL (ref 7–12)
POTASSIUM SERPL-SCNC: 4.3 MMOL/L (ref 3.5–5)
PROTEIN UA: ABNORMAL MG/DL
RBC # BLD: 6 E12/L (ref 3.8–5.8)
RBC UA: ABNORMAL /HPF (ref 0–2)
SODIUM BLD-SCNC: 133 MMOL/L (ref 132–146)
SPECIFIC GRAVITY UA: 1.02 (ref 1–1.03)
TOTAL PROTEIN: 7.8 G/DL (ref 6.4–8.3)
TRIGLYCERIDE, FASTING: 276 MG/DL (ref 0–149)
TSH SERPL DL<=0.05 MIU/L-ACNC: 1.81 UIU/ML (ref 0.27–4.2)
UROBILINOGEN, URINE: 0.2 E.U./DL
VLDLC SERPL CALC-MCNC: 55 MG/DL
WBC # BLD: 7.9 E9/L (ref 4.5–11.5)
WBC UA: ABNORMAL /HPF (ref 0–5)

## 2022-02-19 PROCEDURE — 82570 ASSAY OF URINE CREATININE: CPT

## 2022-02-19 PROCEDURE — 84443 ASSAY THYROID STIM HORMONE: CPT

## 2022-02-19 PROCEDURE — 80053 COMPREHEN METABOLIC PANEL: CPT

## 2022-02-19 PROCEDURE — 82044 UR ALBUMIN SEMIQUANTITATIVE: CPT

## 2022-02-19 PROCEDURE — 83036 HEMOGLOBIN GLYCOSYLATED A1C: CPT

## 2022-02-19 PROCEDURE — 81001 URINALYSIS AUTO W/SCOPE: CPT

## 2022-02-19 PROCEDURE — 36415 COLL VENOUS BLD VENIPUNCTURE: CPT

## 2022-02-19 PROCEDURE — 85025 COMPLETE CBC W/AUTO DIFF WBC: CPT

## 2022-02-19 PROCEDURE — 80061 LIPID PANEL: CPT

## 2022-02-24 DIAGNOSIS — E11.9 TYPE 2 DIABETES MELLITUS WITHOUT COMPLICATION, WITH LONG-TERM CURRENT USE OF INSULIN (HCC): Primary | ICD-10-CM

## 2022-02-24 DIAGNOSIS — Z79.4 TYPE 2 DIABETES MELLITUS WITHOUT COMPLICATION, WITH LONG-TERM CURRENT USE OF INSULIN (HCC): Primary | ICD-10-CM

## 2022-02-24 RX ORDER — GLUCOSAMINE HCL/CHONDROITIN SU 500-400 MG
CAPSULE ORAL
Qty: 400 STRIP | Refills: 3 | Status: SHIPPED
Start: 2022-02-24 | End: 2022-04-11 | Stop reason: SDUPTHER

## 2022-02-24 RX ORDER — LANCETS 30 GAUGE
1 EACH MISCELLANEOUS 4 TIMES DAILY
Qty: 600 EACH | Refills: 1 | Status: SHIPPED
Start: 2022-02-24 | End: 2022-03-24

## 2022-03-09 ENCOUNTER — OFFICE VISIT (OUTPATIENT)
Dept: FAMILY MEDICINE CLINIC | Age: 37
End: 2022-03-09
Payer: MEDICAID

## 2022-03-09 VITALS
TEMPERATURE: 96.8 F | HEART RATE: 81 BPM | BODY MASS INDEX: 45.89 KG/M2 | SYSTOLIC BLOOD PRESSURE: 122 MMHG | WEIGHT: 315 LBS | DIASTOLIC BLOOD PRESSURE: 86 MMHG | OXYGEN SATURATION: 96 %

## 2022-03-09 DIAGNOSIS — I10 ESSENTIAL HYPERTENSION: ICD-10-CM

## 2022-03-09 DIAGNOSIS — E11.9 TYPE 2 DIABETES MELLITUS WITHOUT COMPLICATION, WITH LONG-TERM CURRENT USE OF INSULIN (HCC): Primary | ICD-10-CM

## 2022-03-09 DIAGNOSIS — Z79.4 TYPE 2 DIABETES MELLITUS WITHOUT COMPLICATION, WITH LONG-TERM CURRENT USE OF INSULIN (HCC): Primary | ICD-10-CM

## 2022-03-09 DIAGNOSIS — Z72.0 TOBACCO ABUSE: ICD-10-CM

## 2022-03-09 DIAGNOSIS — R74.8 ABNORMAL LIVER ENZYMES: ICD-10-CM

## 2022-03-09 DIAGNOSIS — F39 MOOD DISORDER (HCC): ICD-10-CM

## 2022-03-09 PROCEDURE — G8427 DOCREV CUR MEDS BY ELIG CLIN: HCPCS | Performed by: FAMILY MEDICINE

## 2022-03-09 PROCEDURE — 99214 OFFICE O/P EST MOD 30 MIN: CPT | Performed by: FAMILY MEDICINE

## 2022-03-09 PROCEDURE — G8417 CALC BMI ABV UP PARAM F/U: HCPCS | Performed by: FAMILY MEDICINE

## 2022-03-09 PROCEDURE — 4004F PT TOBACCO SCREEN RCVD TLK: CPT | Performed by: FAMILY MEDICINE

## 2022-03-09 PROCEDURE — 2022F DILAT RTA XM EVC RTNOPTHY: CPT | Performed by: FAMILY MEDICINE

## 2022-03-09 PROCEDURE — G8484 FLU IMMUNIZE NO ADMIN: HCPCS | Performed by: FAMILY MEDICINE

## 2022-03-09 PROCEDURE — 3046F HEMOGLOBIN A1C LEVEL >9.0%: CPT | Performed by: FAMILY MEDICINE

## 2022-03-09 RX ORDER — INSULIN GLARGINE 100 [IU]/ML
10 INJECTION, SOLUTION SUBCUTANEOUS NIGHTLY
Qty: 5 PEN | Refills: 0 | Status: SHIPPED
Start: 2022-03-09 | End: 2022-07-07 | Stop reason: SDUPTHER

## 2022-03-09 RX ORDER — ALBUTEROL SULFATE 90 UG/1
2 AEROSOL, METERED RESPIRATORY (INHALATION) 4 TIMES DAILY PRN
Qty: 54 G | Refills: 1 | Status: SHIPPED
Start: 2022-03-09 | End: 2022-07-11 | Stop reason: SDUPTHER

## 2022-03-09 ASSESSMENT — ENCOUNTER SYMPTOMS
RESPIRATORY NEGATIVE: 1
GASTROINTESTINAL NEGATIVE: 1
ALLERGIC/IMMUNOLOGIC NEGATIVE: 1
EYES NEGATIVE: 1

## 2022-03-09 NOTE — PROGRESS NOTES
OFFICE PROGRESS NOTE      SUBJECTIVE:        Patient ID:   Paola Alba is a 29 y.o. male who presents for   Chief Complaint   Patient presents with    Diabetes     Here for recheck on DM . Has been monitering glucose at home,. Lab work done,here for review. HPI:     Patient here for follow-up  Patient been started on Amaryl and Lantus  Blood sugar been high  Patient states he is trying to follow the diet  He walks at work  Has been followed by a psychiatrist  Liver enzymes are elevated      Prior to Visit Medications    Medication Sig Taking? Authorizing Provider   insulin glargine (LANTUS SOLOSTAR) 100 UNIT/ML injection pen Inject 10 Units into the skin nightly Yes Kanchan Brenner MD   albuterol sulfate HFA (VENTOLIN HFA) 108 (90 Base) MCG/ACT inhaler Inhale 2 puffs into the lungs 4 times daily as needed for Wheezing Yes Kanchan Brenner MD   blood glucose monitor kit and supplies Dispense sufficient amount for indicated testing frequency plus additional to accommodate PRN testing needs. Dispense all needed supplies to include: monitor, strips, lancing device, lancets, control solutions, alcohol swabs. E11.9 Yes Kanchan Brenner MD   blood glucose monitor strips Test 4 times a day & as needed for symptoms of irregular blood glucose. Dispense sufficient amount for indicated testing frequency plus additional to accommodate PRN testing needs. E11.9 Yes Kanchan Brenner MD   Lancets MISC 1 each by Does not apply route 4 times daily E11.9 Yes Kanchan Brenner MD   blood glucose monitor strips Test 2-3  times a day & as needed for symptoms of irregular blood glucose. Dispense sufficient amount for indicated testing frequency plus additional to accommodate PRN testing needs.  Yes Victor M Tyler MD   glimepiride (AMARYL) 2 MG tablet Take 1 tablet by mouth every morning (before breakfast) Yes Victor M Tyler MD   traZODone (DESYREL) 150 MG tablet Take 2 tablets : Not on file   Social Connections:     Frequency of Communication with Friends and Family: Not on file    Frequency of Social Gatherings with Friends and Family: Not on file    Attends Latter day Services: Not on file    Active Member of [address] [place] [address] [employer] or Organizations: Not on file    Attends Club or Organization Meetings: Not on file    Marital Status: Not on file   Intimate Partner Violence:     Fear of Current or Ex-Partner: Not on file    Emotionally Abused: Not on file    Physically Abused: Not on file    Sexually Abused: Not on file   Housing Stability:     Unable to Pay for Housing in the Last Year: Not on file    Number of Tammi in the Last Year: Not on file    Unstable Housing in the Last Year: Not on file       I have reviewed Bob's allergies, medications, problem list, medical, social and family history and have updated as needed in the electronic medical record  Review Of Systems:    Review of Systems   Constitutional: Negative. HENT: Negative. Eyes: Negative. Respiratory: Negative. Cardiovascular: Negative. Gastrointestinal: Negative. Endocrine: Negative. Genitourinary: Negative. Musculoskeletal: Negative. Allergic/Immunologic: Negative. Neurological: Negative. Hematological: Negative. Psychiatric/Behavioral: Negative. OBJECTIVE:     VS:  Wt Readings from Last 3 Encounters:   03/09/22 (!) 329 lb (149.2 kg)   02/18/22 (!) 333 lb 9.6 oz (151.3 kg)   11/24/21 (!) 350 lb (158.8 kg)     Temp Readings from Last 3 Encounters:   03/09/22 96.8 °F (36 °C) (Infrared)   02/18/22 98 °F (36.7 °C) (Temporal)   11/28/21 96.8 °F (36 °C) (Infrared)     BP Readings from Last 3 Encounters:   03/09/22 122/86   02/18/22 133/77   11/28/21 (!) 147/98        Physical Exam  Constitutional:       Appearance: He is well-developed. HENT:      Head: Normocephalic and atraumatic.    Eyes:      Conjunctiva/sclera: Conjunctivae normal.      Pupils: Pupils are equal, round, and reactive to light. Cardiovascular:      Rate and Rhythm: Normal rate and regular rhythm. Heart sounds: Normal heart sounds. Pulmonary:      Effort: Pulmonary effort is normal.      Breath sounds: Normal breath sounds. Abdominal:      General: Bowel sounds are normal.      Palpations: Abdomen is soft. Musculoskeletal:         General: Normal range of motion. Cervical back: Normal range of motion and neck supple. Skin:     General: Skin is warm and dry. Neurological:      Mental Status: He is alert and oriented to person, place, and time.    Psychiatric:         Behavior: Behavior normal.            Labs :    Lab Results   Component Value Date    WBC 7.9 02/19/2022    HGB 16.6 (H) 02/19/2022    HCT 49.2 02/19/2022     02/19/2022    CHOL 178 12/29/2015    TRIG 92 12/29/2015    HDL 23 02/19/2022     (H) 02/19/2022    AST 73 (H) 02/19/2022     02/19/2022    K 4.3 02/19/2022    CL 95 (L) 02/19/2022    CREATININE 0.9 02/19/2022    BUN 11 02/19/2022    CO2 27 02/19/2022    TSH 1.810 02/19/2022    INR 1.0 11/25/2021    GLUF 316 (H) 02/19/2022    LABA1C 10.5 (H) 02/19/2022    LABMICR 79.5 (H) 02/19/2022     Lab Results   Component Value Date    COLORU Yellow 02/19/2022    NITRU Negative 02/19/2022    GLUCOSEU >=1000 02/19/2022    KETUA Negative 02/19/2022    UROBILINOGEN 0.2 02/19/2022    BILIRUBINUR Negative 02/19/2022     No results found for: PSA, CEA, , [id number],       Controlled Substances Monitoring:                                    ASSESSMENT     Patient Active Problem List    Diagnosis Date Noted    COVID-19 11/26/2021    Prediabetes 02/25/2021    Nausea and vomiting 02/25/2021    Essential hypertension 01/20/2021    Class 3 severe obesity due to excess calories with body mass index (BMI) of 45.0 to 49.9 in adult [facility]) 01/20/2021    Abdominal pain 09/06/2016    Peptic disease 73/61/4228    Biliary colic 32/29/8198    Acute nasopharyngitis 01/08/2016    Contact dermatitis and eczema due to detergents 01/08/2016    Tobacco abuse 12/07/2015    Midline low back pain without sciatica 12/07/2015    Encounter for long-term (current) use of medications 12/07/2015    Chronic fatigue 12/07/2015    Anxiety 12/07/2015        Diagnosis:     ICD-10-CM    1. Type 2 diabetes mellitus without complication, with long-term current use of insulin ([facility])  E11.9 insulin glargine (LANTUS SOLOSTAR) 100 UNIT/ML injection pen    Z79.4 CBC with Auto Differential     Comprehensive Metabolic Panel     External Referral To Endocrinology   2. Essential hypertension  I10 Comprehensive Metabolic Panel   3. Tobacco abuse  Z72.0    4. Mood disorder ([address]  F39    5. Abnormal liver enzymes  R74.8 CBC with Auto Differential     Comprehensive Metabolic Panel     LIPID PANEL       PLAN:   Continue present treatment  Increase the dose of Lantus to 15 units  Repeat the lab work  Follow with a psychiatrist  Check with the psychiatrist medication for the elevated liver enzymes  Return to clinic earlier if any problem  Regular exercises        Patient Instructions   Increase the dose of Lantus to 15 units  Continue the medication  Follow-up with the psychiatrist and mentioned to him about the elevated liver enzymes  Low-sodium low-fat diabetic weight reduction diet  Regular exercise  Endocrinology referral been made  Return to clinic earlier if any problems      Return in about 4 weeks (around 4/6/2022) for Medication Check, test results, diabetes check. Adalid Majano reviewed my findings and recommendations with Ishmael Núñez.     Electronicallysigned by Vijaya Borden MD on 3/9/22 at 11:54 AM EST

## 2022-03-24 DIAGNOSIS — Z79.4 TYPE 2 DIABETES MELLITUS WITHOUT COMPLICATION, WITH LONG-TERM CURRENT USE OF INSULIN (HCC): ICD-10-CM

## 2022-03-24 DIAGNOSIS — E11.9 TYPE 2 DIABETES MELLITUS WITHOUT COMPLICATION, WITH LONG-TERM CURRENT USE OF INSULIN (HCC): ICD-10-CM

## 2022-03-24 PROCEDURE — 3046F HEMOGLOBIN A1C LEVEL >9.0%: CPT | Performed by: FAMILY MEDICINE

## 2022-03-24 RX ORDER — LANCETS 33 GAUGE
EACH MISCELLANEOUS
Qty: 100 EACH | Refills: 2 | Status: SHIPPED
Start: 2022-03-24 | End: 2022-07-07 | Stop reason: SDUPTHER

## 2022-04-04 ENCOUNTER — NURSE TRIAGE (OUTPATIENT)
Dept: OTHER | Facility: CLINIC | Age: 37
End: 2022-04-04

## 2022-04-04 ENCOUNTER — TELEPHONE (OUTPATIENT)
Dept: ENT CLINIC | Age: 37
End: 2022-04-04

## 2022-04-04 ENCOUNTER — HOSPITAL ENCOUNTER (EMERGENCY)
Age: 37
Discharge: HOME | End: 2022-04-04
Attending: EMERGENCY MEDICINE
Payer: MEDICAID

## 2022-04-04 VITALS
HEART RATE: 86 BPM | DIASTOLIC BLOOD PRESSURE: 77 MMHG | RESPIRATION RATE: 18 BRPM | TEMPERATURE: 96.9 F | OXYGEN SATURATION: 97 % | SYSTOLIC BLOOD PRESSURE: 139 MMHG | WEIGHT: 315 LBS | BODY MASS INDEX: 46.17 KG/M2

## 2022-04-04 DIAGNOSIS — H60.391 INFECTIVE OTITIS EXTERNA OF RIGHT EAR: Primary | ICD-10-CM

## 2022-04-04 PROCEDURE — 99283 EMERGENCY DEPT VISIT LOW MDM: CPT

## 2022-04-04 RX ORDER — CEPHALEXIN 500 MG/1
500 CAPSULE ORAL 2 TIMES DAILY
Qty: 20 CAPSULE | Refills: 0 | Status: SHIPPED | OUTPATIENT
Start: 2022-04-04 | End: 2022-04-11

## 2022-04-04 ASSESSMENT — PAIN DESCRIPTION - PROGRESSION: CLINICAL_PROGRESSION: GRADUALLY WORSENING

## 2022-04-04 ASSESSMENT — PAIN DESCRIPTION - ONSET: ONSET: GRADUAL

## 2022-04-04 ASSESSMENT — PAIN DESCRIPTION - PAIN TYPE: TYPE: ACUTE PAIN

## 2022-04-04 ASSESSMENT — PAIN DESCRIPTION - ORIENTATION: ORIENTATION: RIGHT

## 2022-04-04 ASSESSMENT — PAIN SCALES - GENERAL: PAINLEVEL_OUTOF10: 10

## 2022-04-04 ASSESSMENT — PAIN DESCRIPTION - LOCATION: LOCATION: EAR

## 2022-04-04 ASSESSMENT — PAIN - FUNCTIONAL ASSESSMENT: PAIN_FUNCTIONAL_ASSESSMENT: 0-10

## 2022-04-04 ASSESSMENT — PAIN DESCRIPTION - FREQUENCY: FREQUENCY: CONTINUOUS

## 2022-04-04 ASSESSMENT — PAIN DESCRIPTION - DESCRIPTORS: DESCRIPTORS: ACHING

## 2022-04-04 NOTE — Clinical Note
Bennie Restrepo was seen and treated in our emergency department on 4/4/2022. He may return to work on 04/05/2022. If you have any questions or concerns, please don't hesitate to call.       Gina Ventura MD

## 2022-04-04 NOTE — ED PROVIDER NOTES
HPI:  4/4/22,   Time: 11:47 AM EDT         Trish Bundy is a 40 y.o. male presenting to the ED for pimple in right ear canal that hurts, beginning 3 days ago. The complaint has been persistent, moderate in severity, and worsened by nothing. ROS:   Pertinent positives and negatives are stated within HPI, all other systems reviewed and are negative.  --------------------------------------------- PAST HISTORY ---------------------------------------------  Past Medical History:  has a past medical history of Allergic rhinitis, Chronic back pain, Depression, and Hypertension. Past Surgical History:  has a past surgical history that includes Abscess Drainage. Social History:  reports that he has been smoking cigarettes. He started smoking about 25 years ago. He has a 7.50 pack-year smoking history. He has never used smokeless tobacco. He reports current alcohol use. He reports that he does not use drugs. Family History: family history is not on file. The patients home medications have been reviewed. Allergies: Patient has no known allergies. -------------------------------------------------- RESULTS -------------------------------------------------  All laboratory and radiology results have been personally reviewed by myself   LABS:  No results found for this visit on 04/04/22. RADIOLOGY:  Interpreted by Radiologist.  No orders to display       ------------------------- NURSING NOTES AND VITALS REVIEWED ---------------------------   The nursing notes within the ED encounter and vital signs as below have been reviewed.    /77   Pulse 86   Temp 96.9 °F (36.1 °C) (Temporal)   Resp 18   Wt (!) 331 lb (150.1 kg)   SpO2 97%   BMI 46.17 kg/m²   Oxygen Saturation Interpretation: Normal      ---------------------------------------------------PHYSICAL EXAM--------------------------------------      Constitutional/General: Alert and oriented x3, well appearing, non toxic in NAD  Head: NC/AT  Eyes: PERRL, EOMI  Ears: mild swelling noted in right earcanal  Mouth: Oropharynx clear, handling secretions, no trismus  Neck: Supple, full ROM, no meningeal signs  Pulmonary: Lungs clear to auscultation bilaterally, no wheezes, rales, or rhonchi. Not in respiratory distress  Cardiovascular:  Regular rate and rhythm, no murmurs, gallops, or rubs. 2+ distal pulses  Abdomen: Soft, non tender, non distended,   Extremities: Moves all extremities x 4. Warm and well perfused  Skin: warm and dry without rash  Neurologic: GCS 15,  Psych: Normal Affect      ------------------------------ ED COURSE/MEDICAL DECISION MAKING----------------------  Medications - No data to display      Medical Decision Making:      Patient's Medications   New Prescriptions    CEPHALEXIN (KEFLEX) 500 MG CAPSULE    Take 1 capsule by mouth 2 times daily for 10 days    NEOMYCIN-POLYMYXIN-HYDROCORTISONE (CORTISPORIN) 3.5-54443-8 OTIC SOLUTION    Place 4 drops into the right ear 3 times daily for 10 days Instill into right Ear   Previous Medications    ALBUTEROL SULFATE HFA (VENTOLIN HFA) 108 (90 BASE) MCG/ACT INHALER    Inhale 2 puffs into the lungs 4 times daily as needed for Wheezing    ARIPIPRAZOLE (ABILIFY) 5 MG TABLET    Take 5 mg by mouth nightly    BLOOD GLUCOSE MONITOR KIT AND SUPPLIES    Dispense sufficient amount for indicated testing frequency plus additional to accommodate PRN testing needs. Dispense all needed supplies to include: monitor, strips, lancing device, lancets, control solutions, alcohol swabs. E11.9    BLOOD GLUCOSE MONITOR STRIPS    Test 2-3  times a day & as needed for symptoms of irregular blood glucose. Dispense sufficient amount for indicated testing frequency plus additional to accommodate PRN testing needs. BLOOD GLUCOSE MONITOR STRIPS    Test 4 times a day & as needed for symptoms of irregular blood glucose.  Dispense sufficient amount for indicated testing frequency plus additional to accommodate PRN testing needs.E11.9    CLONAZEPAM (KLONOPIN) 0.5 MG TABLET    TAKE 1 AND 1/2 TABLETS BY MOUTH DAILY AS NEEDED    ESCITALOPRAM (LEXAPRO) 20 MG TABLET    TAKE 1 TABLET BY MOUTH DAILY    GABAPENTIN (NEURONTIN) 100 MG CAPSULE    Take 100 mg by mouth. 3 tablets q am    GLIMEPIRIDE (AMARYL) 2 MG TABLET    Take 1 tablet by mouth every morning (before breakfast)    HYDROXYZINE (VISTARIL) 50 MG CAPSULE    TAKE 1 CAPSULE BY MOUTH TWICE DAILY AS NEEDED    INSULIN GLARGINE (LANTUS SOLOSTAR) 100 UNIT/ML INJECTION PEN    Inject 10 Units into the skin nightly    LANCETS (ONETOUCH DELICA PLUS [id number]) MISC    USE TO TEST FOUR TIMES DAILY AS DIRECTED    TOPIRAMATE (TOPAMAX) 25 MG TABLET    TAKE 2 TABLETS BY MOUTH AT BEDTIME    TRAZODONE (DESYREL) 150 MG TABLET    Take 2 tablets by mouth nightly   Modified Medications    No medications on file   Discontinued Medications    No medications on file         Counseling: The emergency provider has spoken with the patient and discussed todays results, in addition to providing specific details for the plan of care and counseling regarding the diagnosis and prognosis. Questions are answered at this time and they are agreeable with the plan.      --------------------------------- IMPRESSION AND DISPOSITION ---------------------------------    IMPRESSION  1.  Infective otitis externa of right ear        DISPOSITION  Disposition: Discharge to home  Patient condition is good                  Cayla Linton MD  04/04/22 [id number]

## 2022-04-04 NOTE — TELEPHONE ENCOUNTER
Patient states a few days ago his right ear began to swell. Still swollen and causing severe pain to the touch. Requesting to establish care with Dr Meme Justin. I did advise patient of the referral requirement for anything to do with ears- trans to [facility] with Nurse Triage.

## 2022-04-04 NOTE — TELEPHONE ENCOUNTER
Received call from Jamie Cain at [facility] with [employer] Subjective: Caller states \"R ear pain\"     Current Symptoms: Swelling and pain to right external ear, redness reported and hot to touch. No known cause. Pain worse with palpation, able to swallow, no lymph node swelling. Denies fever. Able to hear  Out of ear per normal, no drainage. Onset: 2 days ago; worsening    Associated Symptoms: irritability     Pain Severity: 10/10; burning; constant    Temperature: Denies    What has been tried: Tylenol    LMP: NA Pregnant: NA    Recommended disposition: Go to [facility] [address] if no appointments. Care advice provided, patient verbalizes understanding; denies any other questions or concerns; instructed to call back for any new or worsening symptoms. Patient/Caller agrees with recommended disposition; writer provided warm transfer to [facility] for appointment scheduling     Attention Provider: Thank you for allowing me to participate in the care of your patient. The patient was connected to triage in response to information provided to the ECC/PSC. Please do not respond through this encounter as the response is not directed to a shared pool.       Reason for Disposition   Moving the earlobe or touching the ear clearly increases the pain   Redness or swelling of outer ear (ear lobe)    Protocols used: EARACHE-ADULT-OH, EAR - SWIMMER'S-ADULT-OH

## 2022-04-05 ENCOUNTER — TELEPHONE (OUTPATIENT)
Dept: FAMILY MEDICINE CLINIC | Age: 37
End: 2022-04-05

## 2022-04-05 NOTE — TELEPHONE ENCOUNTER
----- Message from Pepito Merino sent at 4/4/2022 10:24 AM EDT -----  Subject: Referral Request    QUESTIONS   Reason for referral request? Patient wants referral to ENT Please and   thank you   Has the physician seen you for this condition before? No   Preferred Specialist (if applicable)? Do you already have an appointment scheduled? No  Additional Information for Provider?   ---------------------------------------------------------------------------  --------------  CALL BACK INFO  What is the best way for the office to contact you? OK to leave message on   voicemail  Preferred Call Back Phone Number? [phone]  ---------------------------------------------------------------------------  --------------  SCRIPT ANSWERS  Relationship to Patient?  Self

## 2022-04-11 ENCOUNTER — HOSPITAL ENCOUNTER (OUTPATIENT)
Age: 37
Discharge: HOME | End: 2022-04-11
Payer: MEDICAID

## 2022-04-11 ENCOUNTER — OFFICE VISIT (OUTPATIENT)
Dept: FAMILY MEDICINE CLINIC | Age: 37
End: 2022-04-11
Payer: MEDICAID

## 2022-04-11 VITALS
HEART RATE: 73 BPM | TEMPERATURE: 97.4 F | DIASTOLIC BLOOD PRESSURE: 80 MMHG | SYSTOLIC BLOOD PRESSURE: 120 MMHG | WEIGHT: 315 LBS | OXYGEN SATURATION: 97 % | BODY MASS INDEX: 46.3 KG/M2

## 2022-04-11 DIAGNOSIS — E11.9 TYPE 2 DIABETES MELLITUS WITHOUT COMPLICATION, WITH LONG-TERM CURRENT USE OF INSULIN (HCC): ICD-10-CM

## 2022-04-11 DIAGNOSIS — Z72.0 TOBACCO ABUSE: ICD-10-CM

## 2022-04-11 DIAGNOSIS — I10 ESSENTIAL HYPERTENSION: Primary | ICD-10-CM

## 2022-04-11 DIAGNOSIS — I10 ESSENTIAL HYPERTENSION: ICD-10-CM

## 2022-04-11 DIAGNOSIS — F39 MOOD DISORDER (HCC): ICD-10-CM

## 2022-04-11 DIAGNOSIS — Z79.4 TYPE 2 DIABETES MELLITUS WITHOUT COMPLICATION, WITH LONG-TERM CURRENT USE OF INSULIN (HCC): ICD-10-CM

## 2022-04-11 DIAGNOSIS — R74.8 ABNORMAL LIVER ENZYMES: ICD-10-CM

## 2022-04-11 LAB
ALBUMIN SERPL-MCNC: 4.2 G/DL (ref 3.5–5.2)
ALP BLD-CCNC: 110 U/L (ref 40–129)
ALT SERPL-CCNC: 87 U/L (ref 0–40)
ANION GAP SERPL CALCULATED.3IONS-SCNC: 12 MMOL/L (ref 7–16)
AST SERPL-CCNC: 39 U/L (ref 0–39)
BASOPHILS ABSOLUTE: 0.03 E9/L (ref 0–0.2)
BASOPHILS RELATIVE PERCENT: 0.4 % (ref 0–2)
BILIRUB SERPL-MCNC: 0.3 MG/DL (ref 0–1.2)
BUN BLDV-MCNC: 12 MG/DL (ref 6–20)
CALCIUM SERPL-MCNC: 9.4 MG/DL (ref 8.6–10.2)
CHLORIDE BLD-SCNC: 103 MMOL/L (ref 98–107)
CHOLESTEROL, TOTAL: 157 MG/DL (ref 0–199)
CO2: 21 MMOL/L (ref 22–29)
CREAT SERPL-MCNC: 0.8 MG/DL (ref 0.7–1.2)
EOSINOPHILS ABSOLUTE: 0.29 E9/L (ref 0.05–0.5)
EOSINOPHILS RELATIVE PERCENT: 3.5 % (ref 0–6)
GFR AFRICAN AMERICAN: >60
GFR NON-AFRICAN AMERICAN: >60 ML/MIN/1.73
GLUCOSE BLD-MCNC: 212 MG/DL (ref 74–99)
HCT VFR BLD CALC: 49 % (ref 37–54)
HDLC SERPL-MCNC: 27 MG/DL
HEMOGLOBIN: 16.2 G/DL (ref 12.5–16.5)
IMMATURE GRANULOCYTES #: 0.05 E9/L
IMMATURE GRANULOCYTES %: 0.6 % (ref 0–5)
LDL CHOLESTEROL CALCULATED: 99 MG/DL (ref 0–99)
LYMPHOCYTES ABSOLUTE: 3.3 E9/L (ref 1.5–4)
LYMPHOCYTES RELATIVE PERCENT: 39.8 % (ref 20–42)
MCH RBC QN AUTO: 27 PG (ref 26–35)
MCHC RBC AUTO-ENTMCNC: 33.1 % (ref 32–34.5)
MCV RBC AUTO: 81.8 FL (ref 80–99.9)
MONOCYTES ABSOLUTE: 0.65 E9/L (ref 0.1–0.95)
MONOCYTES RELATIVE PERCENT: 7.8 % (ref 2–12)
NEUTROPHILS ABSOLUTE: 3.97 E9/L (ref 1.8–7.3)
NEUTROPHILS RELATIVE PERCENT: 47.9 % (ref 43–80)
PDW BLD-RTO: 12.5 FL (ref 11.5–15)
PLATELET # BLD: 261 E9/L (ref 130–450)
PMV BLD AUTO: 9 FL (ref 7–12)
POTASSIUM SERPL-SCNC: 4.3 MMOL/L (ref 3.5–5)
RBC # BLD: 5.99 E12/L (ref 3.8–5.8)
SODIUM BLD-SCNC: 136 MMOL/L (ref 132–146)
TOTAL PROTEIN: 7.5 G/DL (ref 6.4–8.3)
TRIGL SERPL-MCNC: 154 MG/DL (ref 0–149)
VLDLC SERPL CALC-MCNC: 31 MG/DL
WBC # BLD: 8.3 E9/L (ref 4.5–11.5)

## 2022-04-11 PROCEDURE — 4004F PT TOBACCO SCREEN RCVD TLK: CPT | Performed by: FAMILY MEDICINE

## 2022-04-11 PROCEDURE — 85025 COMPLETE CBC W/AUTO DIFF WBC: CPT

## 2022-04-11 PROCEDURE — 3046F HEMOGLOBIN A1C LEVEL >9.0%: CPT | Performed by: FAMILY MEDICINE

## 2022-04-11 PROCEDURE — G8417 CALC BMI ABV UP PARAM F/U: HCPCS | Performed by: FAMILY MEDICINE

## 2022-04-11 PROCEDURE — 99214 OFFICE O/P EST MOD 30 MIN: CPT | Performed by: FAMILY MEDICINE

## 2022-04-11 PROCEDURE — 2022F DILAT RTA XM EVC RTNOPTHY: CPT | Performed by: FAMILY MEDICINE

## 2022-04-11 PROCEDURE — 80061 LIPID PANEL: CPT

## 2022-04-11 PROCEDURE — G8427 DOCREV CUR MEDS BY ELIG CLIN: HCPCS | Performed by: FAMILY MEDICINE

## 2022-04-11 PROCEDURE — 36415 COLL VENOUS BLD VENIPUNCTURE: CPT

## 2022-04-11 PROCEDURE — 80053 COMPREHEN METABOLIC PANEL: CPT

## 2022-04-11 RX ORDER — CONTAINER,EMPTY
1 EACH MISCELLANEOUS PRN
Qty: 1 EACH | Refills: 3 | Status: SHIPPED | OUTPATIENT
Start: 2022-04-11

## 2022-04-11 RX ORDER — BLOOD SUGAR DIAGNOSTIC
1 STRIP MISCELLANEOUS DAILY
Qty: 100 EACH | Refills: 3 | Status: SHIPPED
Start: 2022-04-11 | End: 2022-09-19 | Stop reason: SDUPTHER

## 2022-04-11 RX ORDER — BLOOD SUGAR DIAGNOSTIC
1 STRIP MISCELLANEOUS DAILY
COMMUNITY
End: 2022-04-11 | Stop reason: SDUPTHER

## 2022-04-11 RX ORDER — CONTAINER,EMPTY
1 EACH MISCELLANEOUS PRN
COMMUNITY
End: 2022-04-11 | Stop reason: SDUPTHER

## 2022-04-11 RX ORDER — GLUCOSAMINE HCL/CHONDROITIN SU 500-400 MG
CAPSULE ORAL
Qty: 400 STRIP | Refills: 3 | Status: SHIPPED | OUTPATIENT
Start: 2022-04-11

## 2022-04-11 ASSESSMENT — ENCOUNTER SYMPTOMS
ALLERGIC/IMMUNOLOGIC NEGATIVE: 1
GASTROINTESTINAL NEGATIVE: 1
EYES NEGATIVE: 1
RESPIRATORY NEGATIVE: 1

## 2022-04-11 NOTE — PATIENT INSTRUCTIONS
Continue present treatment  Strict low-sodium low-fat low-carb weight reduction diet  Follow with a psychiatrist  Get the lab work done today  Return to clinic earlier if any problem  Get the annual eye examination

## 2022-04-11 NOTE — PROGRESS NOTES
OFFICE PROGRESS NOTE      SUBJECTIVE:        Patient ID:   Moe Griffin is a 40 y.o. male who presents for   Chief Complaint   Patient presents with    Diabetes     Here for recheck on DM. Reports glucose was 200 this am.           HPI:   Patient states is feeling okay  Blood sugar before curetting  He states he is slow down on smoking  Does follow with a psychiatrist  Did not get the lab work done  He states he follow the diet and exercise        Prior to Visit Medications    Medication Sig Taking? Authorizing Provider   Insulin Pen Needle (PEN NEEDLES) 32G X 5 MM MISC 1 each by Does not apply route daily Yes Historical Provider, MD   sharps container 1 each by Does not apply route as needed Yes Historical Provider, MD   Lancets ([place] Elias) MISC USE TO TEST FOUR TIMES DAILY AS DIRECTED Yes Abisai Estes MD   insulin glargine (LANTUS SOLOSTAR) 100 UNIT/ML injection pen Inject 10 Units into the skin nightly Yes Abisai Estes MD   albuterol sulfate HFA (VENTOLIN HFA) 108 (90 Base) MCG/ACT inhaler Inhale 2 puffs into the lungs 4 times daily as needed for Wheezing Yes Abisai Estes MD   blood glucose monitor kit and supplies Dispense sufficient amount for indicated testing frequency plus additional to accommodate PRN testing needs. Dispense all needed supplies to include: monitor, strips, lancing device, lancets, control solutions, alcohol swabs. E11.9 Yes Abisai Estes MD   blood glucose monitor strips Test 4 times a day & as needed for symptoms of irregular blood glucose. Dispense sufficient amount for indicated testing frequency plus additional to accommodate PRN testing needs. E11.9 Yes Abisai Estes MD   glimepiride (AMARYL) 2 MG tablet Take 1 tablet by mouth every morning (before breakfast) Yes Ganesh Smith MD   traZODone (DESYREL) 150 MG tablet Take 2 tablets by mouth nightly Yes Historical Provider, MD   topiramate (TOPAMAX) 25 MG tablet TAKE 2 TABLETS BY MOUTH AT BEDTIME Yes Historical Provider, MD   gabapentin (NEURONTIN) 100 MG capsule Take 100 mg by mouth. 3 tablets q am Yes Historical Provider, MD   ARIPiprazole (ABILIFY) 5 MG tablet Take 5 mg by mouth nightly Yes Historical Provider, MD   clonazePAM (KLONOPIN) 0.5 MG tablet TAKE 1 AND 1/2 TABLETS BY MOUTH DAILY AS NEEDED Yes Historical Provider, MD   escitalopram (LEXAPRO) 20 MG tablet TAKE 1 TABLET BY MOUTH DAILY Yes Historical Provider, MD   hydrOXYzine (VISTARIL) 50 MG capsule TAKE 1 CAPSULE BY MOUTH TWICE DAILY AS NEEDED Yes Historical Provider, MD      Social History     Socioeconomic History    Marital status: Single     Spouse name: None    Number of children: None    Years of education: None    Highest education level: None   Occupational History    None   Tobacco Use    Smoking status: Current Every Day Smoker     Packs/day: 0.50     Years: 15.00     Pack years: 7.50     Types: Cigarettes     Start date: 1997    Smokeless tobacco: Never Used   Vaping Use    Vaping Use: Never used   Substance and Sexual Activity    Alcohol use: Yes     Alcohol/week: 0.0 standard drinks     Comment: occ    Drug use: No     Comment: medical marijuana    Sexual activity: Yes     Partners: Female   Other Topics Concern    None   Social History Narrative    None     Social Determinants of Health     Financial Resource Strain: Low Risk     Difficulty of Paying Living Expenses: Not hard at all   Food Insecurity: No Food Insecurity    Worried About Running Out of Food in the Last Year: Never true    [address] in the Last Year: Never true   Transportation Needs:     Lack of Transportation (Medical): Not on file    Lack of Transportation (Non-Medical):  Not on file   Physical Activity:     Days of Exercise per Week: Not on file    Minutes of Exercise per Session: Not on file   Stress:     Feeling of Stress : Not on file   Social Connections:     Frequency of Communication with Friends and Family: Not on file    Frequency of Social Gatherings with Friends and Family: Not on file    Attends Spiritism Services: Not on file    Active Member of Clubs or Organizations: Not on file    Attends Club or Organization Meetings: Not on file    Marital Status: Not on file   Intimate Partner Violence:     Fear of Current or Ex-Partner: Not on file    Emotionally Abused: Not on file    Physically Abused: Not on file    Sexually Abused: Not on file   Housing Stability:     Unable to Pay for Housing in the Last Year: Not on file    Number of [place] in the Last Year: Not on file    Unstable Housing in the Last Year: Not on file       I have reviewed Bob's allergies, medications, problem list, medical, social and family history and have updated as needed in the electronic medical record  Review Of Systems:    Review of Systems   Constitutional: Negative. HENT: Negative. Eyes: Negative. Respiratory: Negative. Cardiovascular: Negative. Gastrointestinal: Negative. Endocrine: Negative. Genitourinary: Negative. Musculoskeletal: Negative. Allergic/Immunologic: Negative. Neurological: Negative. Hematological: Negative. Psychiatric/Behavioral: Negative. OBJECTIVE:     VS:  Wt Readings from Last 3 Encounters:   04/11/22 (!) 332 lb (150.6 kg)   04/04/22 (!) 331 lb (150.1 kg)   03/09/22 (!) 329 lb (149.2 kg)     Temp Readings from Last 3 Encounters:   04/11/22 97.4 °F (36.3 °C) (Infrared)   04/04/22 96.9 °F (36.1 °C) (Temporal)   03/09/22 96.8 °F (36 °C) (Infrared)     BP Readings from Last 3 Encounters:   04/11/22 120/80   04/04/22 139/77   03/09/22 122/86        Physical Exam  Constitutional:       Appearance: He is well-developed. HENT:      Head: Normocephalic and atraumatic. Eyes:      Conjunctiva/sclera: Conjunctivae normal.      Pupils: Pupils are equal, round, and reactive to light.    Cardiovascular:      Rate and Rhythm: Normal rate and regular rhythm. Pulses:           Dorsalis pedis pulses are 2+ on the right side and 2+ on the left side. Posterior tibial pulses are 2+ on the right side and 2+ on the left side. Heart sounds: Normal heart sounds. Pulmonary:      Effort: Pulmonary effort is normal.      Breath sounds: Normal breath sounds. Abdominal:      General: Bowel sounds are normal.      Palpations: Abdomen is soft. Musculoskeletal:         General: Normal range of motion. Cervical back: Normal range of motion and neck supple. Right foot: No deformity. Left foot: No deformity. Feet:      Right foot:      Protective Sensation: 7 sites tested. 7 sites sensed. Skin integrity: No ulcer. Left foot:      Protective Sensation: 7 sites tested. 7 sites sensed. Skin integrity: No ulcer. Skin:     General: Skin is warm and dry. Neurological:      Mental Status: He is alert and oriented to person, place, and time.    Psychiatric:         Behavior: Behavior normal.            Labs :    Lab Results   Component Value Date    WBC 7.9 02/19/2022    HGB 16.6 (H) 02/19/2022    HCT 49.2 02/19/2022     02/19/2022    CHOL 178 12/29/2015    TRIG 92 12/29/2015    HDL 23 02/19/2022     (H) 02/19/2022    AST 73 (H) 02/19/2022     02/19/2022    K 4.3 02/19/2022    CL 95 (L) 02/19/2022    CREATININE 0.9 02/19/2022    BUN 11 02/19/2022    CO2 27 02/19/2022    TSH 1.810 02/19/2022    INR 1.0 11/25/2021    GLUF 316 (H) 02/19/2022    LABA1C 10.5 (H) 02/19/2022    LABMICR 79.5 (H) 02/19/2022     Lab Results   Component Value Date    COLORU Yellow 02/19/2022    NITRU Negative 02/19/2022    GLUCOSEU >=1000 02/19/2022    KETUA Negative 02/19/2022    UROBILINOGEN 0.2 02/19/2022    BILIRUBINUR Negative 02/19/2022     No results found for: PSA, CEA, , LF4889,       Controlled Substances Monitoring:                                    ASSESSMENT     Patient Active Problem List    Diagnosis Date Noted    COVID-19 11/26/2021    Prediabetes 02/25/2021    Nausea and vomiting 02/25/2021    Essential hypertension 01/20/2021    Class 3 severe obesity due to excess calories with body mass index (BMI) of 45.0 to 49.9 in adult [facility]) 01/20/2021    Abdominal pain 09/06/2016    Peptic disease 96/18/9936    Biliary colic 90/25/1351    Acute nasopharyngitis 01/08/2016    Contact dermatitis and eczema due to detergents 01/08/2016    Tobacco abuse 12/07/2015    Midline low back pain without sciatica 12/07/2015    Encounter for long-term (current) use of medications 12/07/2015    Chronic fatigue 12/07/2015    Anxiety 12/07/2015        Diagnosis:     ICD-10-CM    1. Essential hypertension  I10 CBC with Auto Differential     Comprehensive Metabolic Panel   2. Type 2 diabetes mellitus without complication, with long-term current use of insulin (HCC)  E11.9 blood glucose monitor strips    Z79.4 Diabetic Foot Exam     CBC with Auto Differential     Comprehensive Metabolic Panel     LIPID PANEL   3. Abnormal liver enzymes  R74.8 CBC with Auto Differential   4. Mood disorder ([facility] [address]  F39    5. Tobacco abuse  Z72.0        PLAN:   Continue present treatment  Low-sodium low-fat low-carb weight reduction diet  Regular exercises  Get the lab work done today  Annual eye examination  Return to clinic earlier if any problems        Patient Instructions   Continue present treatment  Strict low-sodium low-fat low-carb weight reduction diet  Follow with a psychiatrist  Get the lab work done today  Return to clinic earlier if any problem  Get the annual eye examination      Return in about 4 weeks (around 5/9/2022) for diabetes check. Vangie Valverde reviewed my findings and recommendations with Talib Cruz.     Electronicallysigned by Eunice Carmichael MD on 4/11/22 at 9:12 AM EDT

## 2022-04-27 ENCOUNTER — HOSPITAL ENCOUNTER (OUTPATIENT)
Age: 37
Discharge: HOME | End: 2022-04-27
Payer: MEDICAID

## 2022-04-27 PROCEDURE — 80307 DRUG TEST PRSMV CHEM ANLYZR: CPT

## 2022-07-07 ENCOUNTER — PATIENT MESSAGE (OUTPATIENT)
Dept: FAMILY MEDICINE CLINIC | Age: 37
End: 2022-07-07

## 2022-07-07 DIAGNOSIS — Z79.4 TYPE 2 DIABETES MELLITUS WITHOUT COMPLICATION, WITH LONG-TERM CURRENT USE OF INSULIN (HCC): ICD-10-CM

## 2022-07-07 DIAGNOSIS — E11.9 TYPE 2 DIABETES MELLITUS WITHOUT COMPLICATION, WITH LONG-TERM CURRENT USE OF INSULIN (HCC): ICD-10-CM

## 2022-07-07 RX ORDER — LANCETS 33 GAUGE
EACH MISCELLANEOUS
Qty: 100 EACH | Refills: 2 | Status: SHIPPED
Start: 2022-07-07 | End: 2022-10-04

## 2022-07-07 RX ORDER — INSULIN GLARGINE 100 [IU]/ML
10 INJECTION, SOLUTION SUBCUTANEOUS NIGHTLY
Qty: 5 PEN | Refills: 0 | Status: SHIPPED
Start: 2022-07-07 | End: 2022-09-19 | Stop reason: SDUPTHER

## 2022-07-07 NOTE — TELEPHONE ENCOUNTER
From: Azeem Weber  To: Dr. Jett File: 7/7/2022 1:10 PM EDT  Subject: Out of insulin    Was wondering if u could refill my insulin please I have 1 day left of it

## 2022-07-07 NOTE — TELEPHONE ENCOUNTER
Last Appointment:  4/11/2022  Future Appointments   Date Time Provider Mima Maldonado   7/13/2022  3:15 PM Micaela Lieberman MD [facility]

## 2022-07-07 NOTE — TELEPHONE ENCOUNTER
----- Message from Eron Woo sent at 7/7/2022 10:20 AM EDT -----  Subject: Refill Request    QUESTIONS  Name of Medication? Lancets (ONETOUCH DELICA PLUS [id number]) MISC  Patient-reported dosage and instructions? 33 g  How many days do you have left? 2  Preferred [facility]? [id number]  Pharmacy phone number (if available)? [phone]  Additional Information for Provider? Patient would like these to be filled   prior to appointment if possible.  ---------------------------------------------------------------------------  --------------  CALL BACK INFO  What is the best way for the office to contact you? OK to leave message on   voicemail  Preferred Call Back Phone Number? [phone]  ---------------------------------------------------------------------------  --------------  SCRIPT ANSWERS  Relationship to Patient?  Self

## 2022-07-09 ENCOUNTER — PATIENT MESSAGE (OUTPATIENT)
Dept: FAMILY MEDICINE CLINIC | Age: 37
End: 2022-07-09

## 2022-07-11 RX ORDER — ALBUTEROL SULFATE 90 UG/1
2 AEROSOL, METERED RESPIRATORY (INHALATION) 4 TIMES DAILY PRN
Qty: 54 G | Refills: 1 | Status: SHIPPED
Start: 2022-07-11 | End: 2022-09-19 | Stop reason: SDUPTHER

## 2022-07-11 NOTE — TELEPHONE ENCOUNTER
From: Too Mosquera  To: Dr. Miguel Angel Patricia: 7/9/2022 1:49 PM EDT  Subject: Inhaler     Was wondering if u could refill my inhaler for me I just ran out ty

## 2022-07-19 ENCOUNTER — HOSPITAL ENCOUNTER (EMERGENCY)
Age: 37
Discharge: HOME | End: 2022-07-19
Attending: EMERGENCY MEDICINE
Payer: MEDICAID

## 2022-07-19 VITALS
DIASTOLIC BLOOD PRESSURE: 80 MMHG | SYSTOLIC BLOOD PRESSURE: 120 MMHG | HEIGHT: 71 IN | TEMPERATURE: 97.8 F | RESPIRATION RATE: 16 BRPM | OXYGEN SATURATION: 96 % | HEART RATE: 74 BPM | WEIGHT: 315 LBS | BODY MASS INDEX: 44.1 KG/M2

## 2022-07-19 DIAGNOSIS — K52.9 ACUTE GASTROENTERITIS: Primary | ICD-10-CM

## 2022-07-19 PROCEDURE — 99283 EMERGENCY DEPT VISIT LOW MDM: CPT

## 2022-07-19 RX ORDER — LOPERAMIDE HYDROCHLORIDE 2 MG/1
2 CAPSULE ORAL 4 TIMES DAILY PRN
Qty: 10 CAPSULE | Refills: 0 | Status: SHIPPED | OUTPATIENT
Start: 2022-07-19 | End: 2022-07-29

## 2022-07-19 RX ORDER — DICYCLOMINE HYDROCHLORIDE 10 MG/1
10 CAPSULE ORAL
Qty: 20 CAPSULE | Refills: 0 | Status: SHIPPED | OUTPATIENT
Start: 2022-07-19 | End: 2022-08-10

## 2022-07-19 RX ORDER — ONDANSETRON 4 MG/1
4 TABLET, ORALLY DISINTEGRATING ORAL EVERY 8 HOURS PRN
Qty: 10 TABLET | Refills: 0 | Status: SHIPPED | OUTPATIENT
Start: 2022-07-19 | End: 2022-08-27

## 2022-07-19 ASSESSMENT — ENCOUNTER SYMPTOMS
ABDOMINAL PAIN: 1
EYE REDNESS: 0
VOMITING: 0
SHORTNESS OF BREATH: 0
COUGH: 0
WHEEZING: 0
NAUSEA: 1
DIARRHEA: 1
SINUS PRESSURE: 0
BACK PAIN: 0
SORE THROAT: 0
EYE PAIN: 0
EYE DISCHARGE: 0

## 2022-07-19 ASSESSMENT — PAIN DESCRIPTION - LOCATION: LOCATION: ABDOMEN

## 2022-07-19 ASSESSMENT — PAIN SCALES - GENERAL: PAINLEVEL_OUTOF10: 8

## 2022-07-19 ASSESSMENT — PAIN - FUNCTIONAL ASSESSMENT: PAIN_FUNCTIONAL_ASSESSMENT: 0-10

## 2022-07-19 ASSESSMENT — PAIN DESCRIPTION - DESCRIPTORS: DESCRIPTORS: PRESSURE

## 2022-07-19 NOTE — Clinical Note
Yunior Harvey was seen and treated in our emergency department on 7/19/2022. He may return to work on 07/20/2022. If you have any questions or concerns, please don't hesitate to call.       Sky Acevedo MD

## 2022-07-19 NOTE — ED PROVIDER NOTES
ATE SOME MEATS AT WORK YESTERDAY    The history is provided by the patient. Illness   The current episode started today. The onset was sudden. Associated symptoms include abdominal pain, diarrhea and nausea. Pertinent negatives include no fever, no vomiting, no ear pain, no headaches, no sore throat, no cough, no wheezing, no rash, no eye discharge, no eye pain and no eye redness. Review of Systems   Constitutional:  Negative for chills and fever. HENT:  Negative for ear pain, sinus pressure and sore throat. Eyes:  Negative for pain, discharge and redness. Respiratory:  Negative for cough, shortness of breath and wheezing. Cardiovascular:  Negative for chest pain. Gastrointestinal:  Positive for abdominal pain, diarrhea and nausea. Negative for vomiting. Genitourinary:  Negative for dysuria and frequency. Musculoskeletal:  Negative for arthralgias and back pain. Skin:  Negative for rash and wound. Neurological:  Negative for weakness and headaches. Hematological:  Negative for adenopathy. All other systems reviewed and are negative. Physical Exam  Vitals and nursing note reviewed. Constitutional:       Appearance: He is well-developed. HENT:      Head: Normocephalic and atraumatic. Jaw: No trismus. Right Ear: Hearing and external ear normal.      Left Ear: Hearing and external ear normal.      Nose: Nose normal.      Right Sinus: No maxillary sinus tenderness or frontal sinus tenderness. Left Sinus: No maxillary sinus tenderness or frontal sinus tenderness. Mouth/Throat:      Pharynx: Uvula midline. No uvula swelling. Eyes:      General: Lids are normal.      Conjunctiva/sclera: Conjunctivae normal.      Pupils: Pupils are equal, round, and reactive to light. Cardiovascular:      Rate and Rhythm: Normal rate and regular rhythm. Heart sounds: Normal heart sounds. No murmur heard.   Pulmonary:      Effort: Pulmonary effort is normal.      Breath sounds: Normal breath sounds. Abdominal:      General: Bowel sounds are normal.      Palpations: Abdomen is soft. Abdomen is not rigid. Tenderness: There is no abdominal tenderness. There is no guarding or rebound. Musculoskeletal:      Cervical back: Normal range of motion and neck supple. Skin:     General: Skin is warm and dry. Findings: No abrasion or rash. Neurological:      Mental Status: He is alert and oriented to person, place, and time. GCS: GCS eye subscore is 4. GCS verbal subscore is 5. GCS motor subscore is 6. Cranial Nerves: No cranial nerve deficit. Sensory: No sensory deficit. Coordination: Coordination normal.      Gait: Gait normal.        Procedures     MDM          --------------------------------------------- PAST HISTORY ---------------------------------------------  Past Medical History:  has a past medical history of Allergic rhinitis, Chronic back pain, Depression, and Hypertension. Past Surgical History:  has a past surgical history that includes Abscess Drainage. Social History:  reports that he has been smoking cigarettes. He started smoking about 25 years ago. He has a 7.50 pack-year smoking history. He has never used smokeless tobacco. He reports current alcohol use. He reports that he does not use drugs. Family History: family history is not on file. The patients home medications have been reviewed. Allergies: Patient has no known allergies. -------------------------------------------------- RESULTS -------------------------------------------------  Labs:  No results found for this visit on 07/19/22. Radiology:  No orders to display       ------------------------- NURSING NOTES AND VITALS REVIEWED ---------------------------  Date / Time Roomed:  7/19/2022  1:25 PM  ED Bed Assignment:  04/04    The nursing notes within the ED encounter and vital signs as below have been reviewed.    /80   Pulse 74   Temp 97.8 °F (36.6 °C) (Temporal)   Resp 16   Ht 5' 11\" (1.803 m)   Wt (!) 335 lb (152 kg)   SpO2 96%   BMI 46.72 kg/m²   Oxygen Saturation Interpretation: Normal      ------------------------------------------ PROGRESS NOTES ------------------------------------------  I have spoken with the patient and discussed todays results, in addition to providing specific details for the plan of care and counseling regarding the diagnosis and prognosis. Their questions are answered at this time and they are agreeable with the plan. I discussed at length with them reasons for immediate return here for re evaluation. They will followup with primary care by calling their office tomorrow. Medications - No data to display      --------------------------------- ADDITIONAL PROVIDER NOTES ---------------------------------  At this time the patient is without objective evidence of an acute process requiring hospitalization or inpatient management. They have remained hemodynamically stable throughout their entire ED visit and are stable for discharge with outpatient follow-up. The plan has been discussed in detail and they are aware of the specific conditions for emergent return, as well as the importance of follow-up. New Prescriptions    DICYCLOMINE (BENTYL) 10 MG CAPSULE    Take 1 capsule by mouth 4 times daily (before meals and nightly)    LOPERAMIDE (RA ANTI-DIARRHEAL) 2 MG CAPSULE    Take 1 capsule by mouth 4 times daily as needed for Diarrhea    ONDANSETRON (ZOFRAN ODT) 4 MG DISINTEGRATING TABLET    Take 1 tablet by mouth every 8 hours as needed for Nausea or Vomiting       Diagnosis:  1. Acute gastroenteritis        Disposition:  Patient's disposition: Discharge to home  Patient's condition is stable.                     Bella Pulliam MD  07/19/22 1700

## 2022-08-10 ENCOUNTER — HOSPITAL ENCOUNTER (EMERGENCY)
Age: 37
Discharge: HOME | End: 2022-08-10
Attending: FAMILY MEDICINE
Payer: MEDICAID

## 2022-08-10 VITALS
DIASTOLIC BLOOD PRESSURE: 77 MMHG | RESPIRATION RATE: 18 BRPM | TEMPERATURE: 97.1 F | OXYGEN SATURATION: 98 % | BODY MASS INDEX: 44.1 KG/M2 | HEIGHT: 71 IN | HEART RATE: 75 BPM | WEIGHT: 315 LBS | SYSTOLIC BLOOD PRESSURE: 100 MMHG

## 2022-08-10 DIAGNOSIS — T14.8XXA PUNCTURE WOUND: Primary | ICD-10-CM

## 2022-08-10 PROCEDURE — 90471 IMMUNIZATION ADMIN: CPT | Performed by: FAMILY MEDICINE

## 2022-08-10 PROCEDURE — 6360000002 HC RX W HCPCS: Performed by: FAMILY MEDICINE

## 2022-08-10 PROCEDURE — 99284 EMERGENCY DEPT VISIT MOD MDM: CPT

## 2022-08-10 PROCEDURE — 90714 TD VACC NO PRESV 7 YRS+ IM: CPT | Performed by: FAMILY MEDICINE

## 2022-08-10 RX ORDER — CEPHALEXIN 500 MG/1
500 CAPSULE ORAL 4 TIMES DAILY
Qty: 40 CAPSULE | Refills: 0 | Status: SHIPPED | OUTPATIENT
Start: 2022-08-10 | End: 2022-08-20

## 2022-08-10 RX ORDER — NAPROXEN 500 MG/1
500 TABLET ORAL 2 TIMES DAILY
Qty: 20 TABLET | Refills: 0 | Status: ON HOLD | OUTPATIENT
Start: 2022-08-10 | End: 2022-09-18 | Stop reason: HOSPADM

## 2022-08-10 RX ORDER — TRAMADOL HYDROCHLORIDE 50 MG/1
50 TABLET ORAL EVERY 4 HOURS PRN
Qty: 18 TABLET | Refills: 0 | Status: SHIPPED | OUTPATIENT
Start: 2022-08-10 | End: 2022-08-10

## 2022-08-10 RX ORDER — TETANUS AND DIPHTHERIA TOXOIDS ADSORBED 2; 2 [LF]/.5ML; [LF]/.5ML
0.5 INJECTION INTRAMUSCULAR ONCE
Status: COMPLETED | OUTPATIENT
Start: 2022-08-10 | End: 2022-08-10

## 2022-08-10 RX ORDER — TRAMADOL HYDROCHLORIDE 50 MG/1
50 TABLET ORAL EVERY 6 HOURS PRN
Qty: 12 TABLET | Refills: 0 | Status: SHIPPED | OUTPATIENT
Start: 2022-08-10 | End: 2022-08-13

## 2022-08-10 RX ORDER — SULFAMETHOXAZOLE AND TRIMETHOPRIM 800; 160 MG/1; MG/1
1 TABLET ORAL 2 TIMES DAILY
Qty: 20 TABLET | Refills: 0 | Status: SHIPPED | OUTPATIENT
Start: 2022-08-10 | End: 2022-08-20

## 2022-08-10 RX ADMIN — TETANUS AND DIPHTHERIA TOXOIDS ADSORBED 0.5 ML: 2; 2 INJECTION INTRAMUSCULAR at 13:54

## 2022-08-10 ASSESSMENT — PAIN DESCRIPTION - DESCRIPTORS: DESCRIPTORS: PATIENT UNABLE TO DESCRIBE

## 2022-08-10 ASSESSMENT — PAIN DESCRIPTION - PAIN TYPE: TYPE: ACUTE PAIN

## 2022-08-10 ASSESSMENT — PAIN DESCRIPTION - ONSET: ONSET: SUDDEN

## 2022-08-10 ASSESSMENT — PAIN DESCRIPTION - LOCATION: LOCATION: TOE (COMMENT WHICH ONE)

## 2022-08-10 ASSESSMENT — PAIN DESCRIPTION - ORIENTATION: ORIENTATION: RIGHT

## 2022-08-10 ASSESSMENT — PAIN SCALES - GENERAL: PAINLEVEL_OUTOF10: 10

## 2022-08-10 ASSESSMENT — PAIN DESCRIPTION - FREQUENCY: FREQUENCY: CONTINUOUS

## 2022-08-10 ASSESSMENT — PAIN - FUNCTIONAL ASSESSMENT: PAIN_FUNCTIONAL_ASSESSMENT: 0-10

## 2022-08-10 NOTE — ED NOTES
Bare right foot removed from croc type she. Puncture wound noted plantar surface. Foot dirty, put to soak in basin of water shur clense, betadine.      Destinee Amin RN  08/10/22 1447

## 2022-08-10 NOTE — ED PROVIDER NOTES
HPI:  8/10/22,   Time: 2:08 PM EDT         Laura Oreilly is a 40 y.o. male presenting to the ED for injury to the right great toe when he stepped on a nail, while wearing a shoe, croc type footwear. He complains of the pain in the great toe of moderate intensity that is an achy type pain and worse with weightbearing and ambulation. He denies fever chills or body aches. He is an insulin dependent diabetic type II.    ROS:   Pertinent positives and negatives are stated within HPI, all other systems reviewed and are negative.  --------------------------------------------- PAST HISTORY ---------------------------------------------  Past Medical History:  has a past medical history of Allergic rhinitis, Chronic back pain, Depression, and Hypertension. Past Surgical History:  has a past surgical history that includes Abscess Drainage. Social History:  reports that he has been smoking cigarettes. He started smoking about 25 years ago. He has a 7.50 pack-year smoking history. He has never used smokeless tobacco. He reports current alcohol use. He reports that he does not use drugs. Family History: family history is not on file. The patients home medications have been reviewed. Allergies: Patient has no known allergies. -------------------------------------------------- RESULTS -------------------------------------------------  All laboratory and radiology results have been personally reviewed by myself   LABS:  No results found for this visit on 08/10/22. RADIOLOGY:  Interpreted by Radiologist.  No orders to display       ------------------------- NURSING NOTES AND VITALS REVIEWED ---------------------------   The nursing notes within the ED encounter and vital signs as below have been reviewed.    /77   Pulse 75   Temp 97.1 °F (36.2 °C) (Temporal)   Resp 18   Ht 5' 11\" (1.803 m)   Wt (!) 340 lb (154.2 kg)   SpO2 98%   BMI 47.42 kg/m²   Oxygen Saturation Interpretation:

## 2022-08-12 ENCOUNTER — HOSPITAL ENCOUNTER (EMERGENCY)
Age: 37
Discharge: HOME | End: 2022-08-13
Attending: EMERGENCY MEDICINE
Payer: MEDICAID

## 2022-08-12 DIAGNOSIS — S91.131A PUNCTURE WOUND OF GREAT TOE OF RIGHT FOOT, INITIAL ENCOUNTER: Primary | ICD-10-CM

## 2022-08-12 DIAGNOSIS — L03.031 CELLULITIS OF TOE OF RIGHT FOOT: ICD-10-CM

## 2022-08-12 PROCEDURE — 96365 THER/PROPH/DIAG IV INF INIT: CPT

## 2022-08-12 PROCEDURE — 99284 EMERGENCY DEPT VISIT MOD MDM: CPT

## 2022-08-12 PROCEDURE — 96375 TX/PRO/DX INJ NEW DRUG ADDON: CPT

## 2022-08-13 ENCOUNTER — APPOINTMENT (OUTPATIENT)
Dept: GENERAL RADIOLOGY | Age: 37
End: 2022-08-13
Payer: MEDICAID

## 2022-08-13 VITALS
DIASTOLIC BLOOD PRESSURE: 78 MMHG | HEART RATE: 60 BPM | BODY MASS INDEX: 44.1 KG/M2 | TEMPERATURE: 97.9 F | OXYGEN SATURATION: 95 % | WEIGHT: 315 LBS | HEIGHT: 71 IN | SYSTOLIC BLOOD PRESSURE: 118 MMHG | RESPIRATION RATE: 18 BRPM

## 2022-08-13 LAB
ANION GAP SERPL CALCULATED.3IONS-SCNC: 8 MMOL/L (ref 7–16)
BASOPHILS ABSOLUTE: 0.03 E9/L (ref 0–0.2)
BASOPHILS RELATIVE PERCENT: 0.3 % (ref 0–2)
BUN BLDV-MCNC: 15 MG/DL (ref 6–20)
CALCIUM SERPL-MCNC: 9.6 MG/DL (ref 8.6–10.2)
CHLORIDE BLD-SCNC: 101 MMOL/L (ref 98–107)
CO2: 27 MMOL/L (ref 22–29)
CREAT SERPL-MCNC: 1.1 MG/DL (ref 0.7–1.2)
EOSINOPHILS ABSOLUTE: 0.28 E9/L (ref 0.05–0.5)
EOSINOPHILS RELATIVE PERCENT: 2.9 % (ref 0–6)
GFR AFRICAN AMERICAN: >60
GFR NON-AFRICAN AMERICAN: >60 ML/MIN/1.73
GLUCOSE BLD-MCNC: 196 MG/DL (ref 74–99)
HCT VFR BLD CALC: 45.6 % (ref 37–54)
HEMOGLOBIN: 15 G/DL (ref 12.5–16.5)
IMMATURE GRANULOCYTES #: 0.04 E9/L
IMMATURE GRANULOCYTES %: 0.4 % (ref 0–5)
LYMPHOCYTES ABSOLUTE: 4.5 E9/L (ref 1.5–4)
LYMPHOCYTES RELATIVE PERCENT: 47.1 % (ref 20–42)
MCH RBC QN AUTO: 28.1 PG (ref 26–35)
MCHC RBC AUTO-ENTMCNC: 32.9 % (ref 32–34.5)
MCV RBC AUTO: 85.4 FL (ref 80–99.9)
MONOCYTES ABSOLUTE: 0.68 E9/L (ref 0.1–0.95)
MONOCYTES RELATIVE PERCENT: 7.1 % (ref 2–12)
NEUTROPHILS ABSOLUTE: 4.03 E9/L (ref 1.8–7.3)
NEUTROPHILS RELATIVE PERCENT: 42.2 % (ref 43–80)
PDW BLD-RTO: 12.8 FL (ref 11.5–15)
PLATELET # BLD: 239 E9/L (ref 130–450)
PMV BLD AUTO: 8.8 FL (ref 7–12)
POTASSIUM SERPL-SCNC: 4.4 MMOL/L (ref 3.5–5)
RBC # BLD: 5.34 E12/L (ref 3.8–5.8)
SODIUM BLD-SCNC: 136 MMOL/L (ref 132–146)
WBC # BLD: 9.6 E9/L (ref 4.5–11.5)

## 2022-08-13 PROCEDURE — 85025 COMPLETE CBC W/AUTO DIFF WBC: CPT

## 2022-08-13 PROCEDURE — 36415 COLL VENOUS BLD VENIPUNCTURE: CPT

## 2022-08-13 PROCEDURE — 80048 BASIC METABOLIC PNL TOTAL CA: CPT

## 2022-08-13 PROCEDURE — 96365 THER/PROPH/DIAG IV INF INIT: CPT

## 2022-08-13 PROCEDURE — 6370000000 HC RX 637 (ALT 250 FOR IP): Performed by: EMERGENCY MEDICINE

## 2022-08-13 PROCEDURE — 73630 X-RAY EXAM OF FOOT: CPT

## 2022-08-13 PROCEDURE — 73590 X-RAY EXAM OF LOWER LEG: CPT

## 2022-08-13 PROCEDURE — 6360000002 HC RX W HCPCS: Performed by: EMERGENCY MEDICINE

## 2022-08-13 PROCEDURE — 2580000003 HC RX 258: Performed by: EMERGENCY MEDICINE

## 2022-08-13 PROCEDURE — 96375 TX/PRO/DX INJ NEW DRUG ADDON: CPT

## 2022-08-13 RX ORDER — KETOROLAC TROMETHAMINE 30 MG/ML
30 INJECTION, SOLUTION INTRAMUSCULAR; INTRAVENOUS ONCE
Status: COMPLETED | OUTPATIENT
Start: 2022-08-13 | End: 2022-08-13

## 2022-08-13 RX ORDER — HYDROCODONE BITARTRATE AND ACETAMINOPHEN 5; 325 MG/1; MG/1
1 TABLET ORAL ONCE
Status: COMPLETED | OUTPATIENT
Start: 2022-08-13 | End: 2022-08-13

## 2022-08-13 RX ORDER — LEVOFLOXACIN 5 MG/ML
750 INJECTION, SOLUTION INTRAVENOUS ONCE
Status: COMPLETED | OUTPATIENT
Start: 2022-08-13 | End: 2022-08-13

## 2022-08-13 RX ORDER — VANCOMYCIN HYDROCHLORIDE 1 G/20ML
INJECTION, POWDER, LYOPHILIZED, FOR SOLUTION INTRAVENOUS
Status: DISCONTINUED
Start: 2022-08-13 | End: 2022-08-13 | Stop reason: HOSPADM

## 2022-08-13 RX ORDER — LEVOFLOXACIN 250 MG/1
750 TABLET ORAL DAILY
Qty: 30 TABLET | Refills: 0 | Status: SHIPPED | OUTPATIENT
Start: 2022-08-13 | End: 2022-08-23

## 2022-08-13 RX ORDER — HYDROCODONE BITARTRATE AND ACETAMINOPHEN 5; 325 MG/1; MG/1
1 TABLET ORAL EVERY 6 HOURS PRN
Qty: 12 TABLET | Refills: 0 | Status: SHIPPED | OUTPATIENT
Start: 2022-08-13 | End: 2022-08-16

## 2022-08-13 RX ORDER — SODIUM CHLORIDE 9 MG/ML
INJECTION, SOLUTION INTRAVENOUS
Status: DISCONTINUED
Start: 2022-08-13 | End: 2022-08-13 | Stop reason: HOSPADM

## 2022-08-13 RX ADMIN — KETOROLAC TROMETHAMINE 30 MG: 30 INJECTION, SOLUTION INTRAMUSCULAR at 01:01

## 2022-08-13 RX ADMIN — HYDROCODONE BITARTRATE AND ACETAMINOPHEN 1 TABLET: 5; 325 TABLET ORAL at 01:45

## 2022-08-13 RX ADMIN — LEVOFLOXACIN 750 MG: 5 INJECTION, SOLUTION INTRAVENOUS at 01:50

## 2022-08-13 RX ADMIN — VANCOMYCIN HYDROCHLORIDE 1000 MG: 1 INJECTION, POWDER, LYOPHILIZED, FOR SOLUTION INTRAVENOUS at 01:10

## 2022-08-13 ASSESSMENT — PAIN DESCRIPTION - LOCATION: LOCATION: TOE (COMMENT WHICH ONE)

## 2022-08-13 ASSESSMENT — PAIN DESCRIPTION - ORIENTATION: ORIENTATION: RIGHT

## 2022-08-13 ASSESSMENT — ENCOUNTER SYMPTOMS
EYE DISCHARGE: 0
EYE PAIN: 0
EYE REDNESS: 0
BACK PAIN: 0
ABDOMINAL PAIN: 0
NAUSEA: 0
DIARRHEA: 0
COUGH: 0
WHEEZING: 0
SHORTNESS OF BREATH: 0
SINUS PRESSURE: 0
VOMITING: 0
SORE THROAT: 0

## 2022-08-13 ASSESSMENT — PAIN DESCRIPTION - PAIN TYPE: TYPE: ACUTE PAIN

## 2022-08-13 ASSESSMENT — PAIN SCALES - GENERAL
PAINLEVEL_OUTOF10: 10
PAINLEVEL_OUTOF10: 10

## 2022-08-13 NOTE — ED PROVIDER NOTES
Patient is a 41 y/o male who presents to the ED with right foot pain. Patient states that he stepped on a nail four days ago. He states that he was seen at Urgent Care, tetanus was updated and he was discharged on antibiotics. He states that the pain in his right foot is worsening and now radiates up his right leg and into his back. He denies any fever. He is a diabetic. Review of Systems   Constitutional:  Negative for chills and fever. HENT:  Negative for ear pain, sinus pressure and sore throat. Eyes:  Negative for pain, discharge and redness. Respiratory:  Negative for cough, shortness of breath and wheezing. Cardiovascular:  Negative for chest pain. Gastrointestinal:  Negative for abdominal pain, diarrhea, nausea and vomiting. Genitourinary:  Negative for dysuria and frequency. Musculoskeletal:  Positive for arthralgias. Negative for back pain. Skin:  Positive for wound. Negative for rash. Neurological:  Negative for weakness and headaches. Hematological:  Negative for adenopathy. All other systems reviewed and are negative. Physical Exam  Vitals and nursing note reviewed. Constitutional:       General: He is not in acute distress. Appearance: He is obese. HENT:      Head: Normocephalic and atraumatic. Right Ear: External ear normal.      Left Ear: External ear normal.      Nose: Nose normal.      Mouth/Throat:      Mouth: Mucous membranes are moist.   Eyes:      Conjunctiva/sclera: Conjunctivae normal.      Pupils: Pupils are equal, round, and reactive to light. Cardiovascular:      Rate and Rhythm: Normal rate and regular rhythm. Heart sounds: No murmur heard. Pulmonary:      Effort: Pulmonary effort is normal. No respiratory distress. Breath sounds: Normal breath sounds. No stridor. No wheezing, rhonchi or rales. Abdominal:      General: Bowel sounds are normal. There is no distension. Palpations: Abdomen is soft. Tenderness:  There is no abdominal tenderness. There is no guarding. Musculoskeletal:      Cervical back: Normal range of motion and neck supple. Right lower leg: Normal. No swelling, deformity, tenderness or bony tenderness. Right ankle: Normal. No swelling or deformity. No tenderness. Right foot: Swelling and tenderness present. No deformity or bony tenderness. Skin:     General: Skin is warm and dry. Comments: Puncture wound noted plantar surface of right great toe. Mild swelling. No erythema or drainage noted. Neurological:      Mental Status: He is alert and oriented to person, place, and time. Procedures     [facility]                 --------------------------------------------- PAST HISTORY ---------------------------------------------  Past Medical History:  has a past medical history of Allergic rhinitis, Chronic back pain, Depression, and Hypertension. Past Surgical History:  has a past surgical history that includes Abscess Drainage. Social History:  reports that he has been smoking cigarettes. He started smoking about 25 years ago. He has a 7.50 pack-year smoking history. He has never used smokeless tobacco. He reports current alcohol use. He reports that he does not use drugs. Family History: family history is not on file. The patients home medications have been reviewed. Allergies: Patient has no known allergies.     -------------------------------------------------- RESULTS -------------------------------------------------  Labs:  Results for orders placed or performed during the hospital encounter of 08/12/22   CBC with Auto Differential   Result Value Ref Range    WBC 9.6 4.5 - 11.5 E9/L    RBC 5.34 3.80 - 5.80 E12/L    Hemoglobin 15.0 12.5 - 16.5 g/dL    Hematocrit 45.6 37.0 - 54.0 %    MCV 85.4 80.0 - 99.9 fL    MCH 28.1 26.0 - 35.0 pg    MCHC 32.9 32.0 - 34.5 %    RDW 12.8 11.5 - 15.0 fL    Platelets 486 818 - 001 E9/L    MPV 8.8 7.0 - 12.0 fL    Neutrophils % 42.2 (L) 43.0 - 80.0 %    Immature Granulocytes % 0.4 0.0 - 5.0 %    Lymphocytes % 47.1 (H) 20.0 - 42.0 %    Monocytes % 7.1 2.0 - 12.0 %    Eosinophils % 2.9 0.0 - 6.0 %    Basophils % 0.3 0.0 - 2.0 %    Neutrophils Absolute 4.03 1.80 - 7.30 E9/L    Immature Granulocytes # 0.04 E9/L    Lymphocytes Absolute 4.50 (H) 1.50 - 4.00 E9/L    Monocytes Absolute 0.68 0.10 - 0.95 E9/L    Eosinophils Absolute 0.28 0.05 - 0.50 E9/L    Basophils Absolute 0.03 0.00 - 0.20 L5/G   Basic Metabolic Panel   Result Value Ref Range    Sodium 136 132 - 146 mmol/L    Potassium 4.4 3.5 - 5.0 mmol/L    Chloride 101 98 - 107 mmol/L    CO2 27 22 - 29 mmol/L    Anion Gap 8 7 - 16 mmol/L    Glucose 196 (H) 74 - 99 mg/dL    BUN 15 6 - 20 mg/dL    Creatinine 1.1 0.7 - 1.2 mg/dL    GFR Non-African American >60 >=60 mL/min/1.73    GFR African American >60     Calcium 9.6 8.6 - 10.2 mg/dL       Radiology:  XR TIBIA FIBULA RIGHT (2 VIEWS)   Final Result   No acute osseous abnormality         XR FOOT RIGHT (MIN 3 VIEWS)   Final Result   No acute osseous abnormality      No radiographic evidence of osteomyelitis.             ------------------------- NURSING NOTES AND VITALS REVIEWED ---------------------------  Date / Time Roomed:  8/12/2022 11:46 PM  ED Bed Assignment:  19/19    The nursing notes within the ED encounter and vital signs as below have been reviewed. /68   Pulse 62   Temp 97.9 °F (36.6 °C)   Resp 18   Ht 5' 11\" (1.803 m)   Wt (!) 340 lb (154.2 kg)   SpO2 94%   BMI 47.42 kg/m²   Oxygen Saturation Interpretation: Normal      ------------------------------------------ PROGRESS NOTES ------------------------------------------  I have spoken with the patient and discussed todays results, in addition to providing specific details for the plan of care and counseling regarding the diagnosis and prognosis. Their questions are answered at this time and they are agreeable with the plan.  I discussed at length with them reasons for immediate return here for re evaluation. They will followup with primary care by calling their office tomorrow. --------------------------------- ADDITIONAL PROVIDER NOTES ---------------------------------  At this time the patient is without objective evidence of an acute process requiring hospitalization or inpatient management. They have remained hemodynamically stable throughout their entire ED visit and are stable for discharge with outpatient follow-up. The plan has been discussed in detail and they are aware of the specific conditions for emergent return, as well as the importance of follow-up. New Prescriptions    HYDROCODONE-ACETAMINOPHEN (NORCO) 5-325 MG PER TABLET    Take 1 tablet by mouth every 6 hours as needed for Pain for up to 3 days. Intended supply: 3 days. Take lowest dose possible to manage pain    LEVOFLOXACIN (LEVAQUIN) 250 MG TABLET    Take 3 tablets by mouth in the morning for 10 days. Diagnosis:  1. Puncture wound of great toe of right foot, initial encounter    2. Cellulitis of toe of right foot        Disposition:  Patient's disposition: Discharge to home  Patient's condition is stable.          [address] [facility],   08/13/22 0159

## 2022-08-16 ENCOUNTER — APPOINTMENT (OUTPATIENT)
Dept: GENERAL RADIOLOGY | Age: 37
End: 2022-08-16
Payer: MEDICAID

## 2022-08-16 ENCOUNTER — HOSPITAL ENCOUNTER (EMERGENCY)
Age: 37
Discharge: HOME | End: 2022-08-16
Attending: EMERGENCY MEDICINE
Payer: MEDICAID

## 2022-08-16 VITALS
RESPIRATION RATE: 18 BRPM | WEIGHT: 315 LBS | TEMPERATURE: 97.5 F | SYSTOLIC BLOOD PRESSURE: 158 MMHG | BODY MASS INDEX: 47.42 KG/M2 | HEART RATE: 58 BPM | OXYGEN SATURATION: 97 % | DIASTOLIC BLOOD PRESSURE: 94 MMHG

## 2022-08-16 DIAGNOSIS — S91.139A: Primary | ICD-10-CM

## 2022-08-16 LAB
ANION GAP SERPL CALCULATED.3IONS-SCNC: 9 MMOL/L (ref 7–16)
BASOPHILS ABSOLUTE: 0.03 E9/L (ref 0–0.2)
BASOPHILS RELATIVE PERCENT: 0.4 % (ref 0–2)
BUN BLDV-MCNC: 17 MG/DL (ref 6–20)
CALCIUM SERPL-MCNC: 9.6 MG/DL (ref 8.6–10.2)
CHLORIDE BLD-SCNC: 101 MMOL/L (ref 98–107)
CO2: 26 MMOL/L (ref 22–29)
CREAT SERPL-MCNC: 0.9 MG/DL (ref 0.7–1.2)
EOSINOPHILS ABSOLUTE: 0.22 E9/L (ref 0.05–0.5)
EOSINOPHILS RELATIVE PERCENT: 2.7 % (ref 0–6)
GFR AFRICAN AMERICAN: >60
GFR NON-AFRICAN AMERICAN: >60 ML/MIN/1.73
GLUCOSE BLD-MCNC: 167 MG/DL (ref 74–99)
HCT VFR BLD CALC: 50.1 % (ref 37–54)
HEMOGLOBIN: 16.7 G/DL (ref 12.5–16.5)
IMMATURE GRANULOCYTES #: 0.04 E9/L
IMMATURE GRANULOCYTES %: 0.5 % (ref 0–5)
LACTIC ACID: 0.9 MMOL/L (ref 0.5–2.2)
LYMPHOCYTES ABSOLUTE: 3.15 E9/L (ref 1.5–4)
LYMPHOCYTES RELATIVE PERCENT: 38.1 % (ref 20–42)
MCH RBC QN AUTO: 27.6 PG (ref 26–35)
MCHC RBC AUTO-ENTMCNC: 33.3 % (ref 32–34.5)
MCV RBC AUTO: 82.7 FL (ref 80–99.9)
MONOCYTES ABSOLUTE: 0.6 E9/L (ref 0.1–0.95)
MONOCYTES RELATIVE PERCENT: 7.3 % (ref 2–12)
NEUTROPHILS ABSOLUTE: 4.23 E9/L (ref 1.8–7.3)
NEUTROPHILS RELATIVE PERCENT: 51 % (ref 43–80)
PDW BLD-RTO: 12.5 FL (ref 11.5–15)
PLATELET # BLD: 255 E9/L (ref 130–450)
PMV BLD AUTO: 8.7 FL (ref 7–12)
POTASSIUM REFLEX MAGNESIUM: 4.8 MMOL/L (ref 3.5–5)
RBC # BLD: 6.06 E12/L (ref 3.8–5.8)
SODIUM BLD-SCNC: 136 MMOL/L (ref 132–146)
WBC # BLD: 8.3 E9/L (ref 4.5–11.5)

## 2022-08-16 PROCEDURE — 6370000000 HC RX 637 (ALT 250 FOR IP): Performed by: STUDENT IN AN ORGANIZED HEALTH CARE EDUCATION/TRAINING PROGRAM

## 2022-08-16 PROCEDURE — 83605 ASSAY OF LACTIC ACID: CPT

## 2022-08-16 PROCEDURE — 36415 COLL VENOUS BLD VENIPUNCTURE: CPT

## 2022-08-16 PROCEDURE — 85025 COMPLETE CBC W/AUTO DIFF WBC: CPT

## 2022-08-16 PROCEDURE — 87040 BLOOD CULTURE FOR BACTERIA: CPT

## 2022-08-16 PROCEDURE — 80048 BASIC METABOLIC PNL TOTAL CA: CPT

## 2022-08-16 PROCEDURE — 99284 EMERGENCY DEPT VISIT MOD MDM: CPT

## 2022-08-16 PROCEDURE — 73630 X-RAY EXAM OF FOOT: CPT

## 2022-08-16 RX ORDER — HYDROCODONE BITARTRATE AND ACETAMINOPHEN 5; 325 MG/1; MG/1
1 TABLET ORAL ONCE
Status: COMPLETED | OUTPATIENT
Start: 2022-08-16 | End: 2022-08-16

## 2022-08-16 RX ADMIN — HYDROCODONE BITARTRATE AND ACETAMINOPHEN 1 TABLET: 5; 325 TABLET ORAL at 11:32

## 2022-08-16 ASSESSMENT — ENCOUNTER SYMPTOMS
FACIAL SWELLING: 0
EYE ITCHING: 0
DIARRHEA: 0
EYE REDNESS: 0
COUGH: 0
NAUSEA: 0
SORE THROAT: 0
CONSTIPATION: 0
BACK PAIN: 0
RHINORRHEA: 0
SHORTNESS OF BREATH: 0
TROUBLE SWALLOWING: 0
VOMITING: 0
EYE PAIN: 0
ABDOMINAL PAIN: 0

## 2022-08-16 ASSESSMENT — PAIN SCALES - GENERAL: PAINLEVEL_OUTOF10: 9

## 2022-08-16 NOTE — DISCHARGE INSTRUCTIONS
Please follow-up with podiatry listed in your paperwork. Please return to ED for any new or worsening symptoms.

## 2022-08-16 NOTE — ED PROVIDER NOTES
tablet, Take 3 tablets by mouth in the morning for 10 days. , Disp: 30 tablet, Rfl: 0    cephALEXin (KEFLEX) 500 MG capsule, Take 1 capsule by mouth in the morning and 1 capsule at noon and 1 capsule in the evening and 1 capsule before bedtime. Do all this for 10 days. , Disp: 40 capsule, Rfl: 0    sulfamethoxazole-trimethoprim (BACTRIM DS) 800-160 MG per tablet, Take 1 tablet by mouth in the morning and 1 tablet before bedtime. Do all this for 10 days. , Disp: 20 tablet, Rfl: 0    naproxen (NAPROSYN) 500 MG tablet, Take 1 tablet by mouth in the morning and 1 tablet before bedtime. Do all this for 10 days. , Disp: 20 tablet, Rfl: 0    ondansetron (ZOFRAN ODT) 4 MG disintegrating tablet, Take 1 tablet by mouth every 8 hours as needed for Nausea or Vomiting, Disp: 10 tablet, Rfl: 0    Lancets (ONETOUCH DELICA PLUS [id number]) MISC, USE TO TEST FOUR TIMES DAILY AS DIRECTED, Disp: 100 each, Rfl: 2    sharps container, 1 each by Does not apply route as needed (needles), Disp: 1 each, Rfl: 3    Insulin Pen Needle (PEN NEEDLES) 32G X 5 MM MISC, 1 each by Does not apply route daily, Disp: 100 each, Rfl: 3    blood glucose monitor strips, Test 4 times a day & as needed for symptoms of irregular blood glucose. Dispense sufficient amount for indicated testing frequency plus additional to accommodate PRN testing needs. E11.9, Disp: 400 strip, Rfl: 3    blood glucose monitor kit and supplies, Dispense sufficient amount for indicated testing frequency plus additional to accommodate PRN testing needs. Dispense all needed supplies to include: monitor, strips, lancing device, lancets, control solutions, alcohol swabs.  E11.9, Disp: 1 kit, Rfl: 0    glimepiride (AMARYL) 2 MG tablet, Take 1 tablet by mouth every morning (before breakfast), Disp: 90 tablet, Rfl: 0    traZODone (DESYREL) 150 MG tablet, Take 2 tablets by mouth nightly, Disp: , Rfl:     topiramate (TOPAMAX) 25 MG tablet, TAKE 2 TABLETS BY MOUTH AT BEDTIME, Disp: , Rfl: gabapentin (NEURONTIN) 100 MG capsule, Take 100 mg by mouth in the morning and 100 mg at noon and 100 mg before bedtime. 3 tablets q am., Disp: , Rfl:     ARIPiprazole (ABILIFY) 5 MG tablet, Take 5 mg by mouth nightly, Disp: , Rfl:     clonazePAM (KLONOPIN) 0.5 MG tablet, TAKE 1 AND 1/2 TABLETS BY MOUTH DAILY AS NEEDED, Disp: , Rfl:     escitalopram (LEXAPRO) 20 MG tablet, TAKE 1 TABLET BY MOUTH DAILY, Disp: , Rfl:     hydrOXYzine (VISTARIL) 50 MG capsule, TAKE 1 CAPSULE BY MOUTH TWICE DAILY AS NEEDED, Disp: , Rfl:      Review of Systems   Constitutional:  Negative for chills, fatigue and fever. HENT:  Negative for congestion, facial swelling, rhinorrhea, sore throat and trouble swallowing. Eyes:  Negative for pain, redness and itching. Respiratory:  Negative for cough and shortness of breath. Cardiovascular:  Negative for chest pain and palpitations. Gastrointestinal:  Negative for abdominal pain, constipation, diarrhea, nausea and vomiting. Endocrine: Negative for polyuria. Genitourinary:  Negative for difficulty urinating, dysuria, flank pain and hematuria. Musculoskeletal:  Negative for arthralgias, back pain, myalgias and neck pain. Skin:  Positive for wound (right great toe). Negative for pallor and rash. Neurological:  Negative for dizziness, syncope, weakness, numbness and headaches. Psychiatric/Behavioral:  Negative for agitation, behavioral problems and confusion. The patient is not nervous/anxious. Physical Exam  Vitals and nursing note reviewed. Constitutional:       General: He is not in acute distress. Appearance: Normal appearance. He is not ill-appearing, toxic-appearing or diaphoretic. HENT:      Head: Normocephalic and atraumatic. Right Ear: External ear normal.      Left Ear: External ear normal.      Nose: Nose normal.      Mouth/Throat:      Mouth: Mucous membranes are moist.      Pharynx: Oropharynx is clear. Eyes:      General: No scleral icterus. Right eye: No discharge. Left eye: No discharge. Pupils: Pupils are equal, round, and reactive to light. Cardiovascular:      Rate and Rhythm: Normal rate and regular rhythm. Pulses: Normal pulses. Pulmonary:      Effort: Pulmonary effort is normal. No respiratory distress. Breath sounds: Normal breath sounds. Abdominal:      General: There is no distension. Palpations: Abdomen is soft. Tenderness: There is no abdominal tenderness. There is no guarding or rebound. Musculoskeletal:         General: No tenderness or deformity. Normal range of motion. Cervical back: Normal range of motion. No tenderness. Right lower leg: No edema. Left lower leg: No edema. Skin:     General: Skin is warm and dry. Capillary Refill: Capillary refill takes less than 2 seconds. Coloration: Skin is not jaundiced or pale. Findings: Lesion (volar surface of right great toe (see image)) present. No bruising, erythema or rash. Neurological:      General: No focal deficit present. Mental Status: He is alert and oriented to person, place, and time. Cranial Nerves: No cranial nerve deficit. Motor: No weakness. Psychiatric:         Mood and Affect: Mood normal.         Behavior: Behavior normal.              Procedures     MDM  Number of Diagnoses or Management Options  Puncture wound of toe of right foot, initial encounter  Diagnosis management comments: Patient is 61-year-old male history of diabetes who presents today for continued pain in the right great toe as he stepped on a nail approximately 1 week ago that went through the sole of his shoe. Denies any fevers or systemic symptoms. Patient is on Levaquin currently. He did have his tetanus updated as well as an x-ray shortly after the injury. He did receive IV antibiotics in the ED 4 days ago. Patient alert and oriented, no distress. /113 other vitals within normal limits. y.o. male whom is in no distress. Physical exam reveals edema of the first toe on the right foot. There is a wound on the plantar surface over the IP joint. There is no purulent discharge coming from this area. The toe is warm to palpation and edematous. Tender to palpation. My plan: Symptomatic and supportive care. Appropriate labs and imaging. Electronically signed by Marivel Eric DO on 8/16/22 at 12:11 PM EDT       [MS]   1407 XR FOOT RIGHT (MIN 3 VIEWS)     IMPRESSION:  No acute osseous abnormality. No abnormal soft tissue gas. No evidence of  osteomyelitis.  [PW]      ED Course User Index  [MS] Marivel Eric DO  [PW] Julieta Mccormack DO        ---------------------------------------------- RESULTS -------------------------------------------------  Labs:  Results for orders placed or performed during the hospital encounter of 08/16/22   CBC with Auto Differential   Result Value Ref Range    WBC 8.3 4.5 - 11.5 E9/L    RBC 6.06 (H) 3.80 - 5.80 E12/L    Hemoglobin 16.7 (H) 12.5 - 16.5 g/dL    Hematocrit 50.1 37.0 - 54.0 %    MCV 82.7 80.0 - 99.9 fL    MCH 27.6 26.0 - 35.0 pg    MCHC 33.3 32.0 - 34.5 %    RDW 12.5 11.5 - 15.0 fL    Platelets 971 204 - 549 E9/L    MPV 8.7 7.0 - 12.0 fL    Neutrophils % 51.0 43.0 - 80.0 %    Immature Granulocytes % 0.5 0.0 - 5.0 %    Lymphocytes % 38.1 20.0 - 42.0 %    Monocytes % 7.3 2.0 - 12.0 %    Eosinophils % 2.7 0.0 - 6.0 %    Basophils % 0.4 0.0 - 2.0 %    Neutrophils Absolute 4.23 1.80 - 7.30 E9/L    Immature Granulocytes # 0.04 E9/L    Lymphocytes Absolute 3.15 1.50 - 4.00 E9/L    Monocytes Absolute 0.60 0.10 - 0.95 E9/L    Eosinophils Absolute 0.22 0.05 - 0.50 E9/L    Basophils Absolute 0.03 0.00 - 0.20 Z3/L   Basic Metabolic Panel w/ Reflex to MG   Result Value Ref Range    Sodium 136 132 - 146 mmol/L    Potassium reflex Magnesium 4.8 3.5 - 5.0 mmol/L    Chloride 101 98 - 107 mmol/L    CO2 26 22 - 29 mmol/L    Anion Gap 9 7 - 16 mmol/L    Glucose 167 (H) 74 - 99 mg/dL    BUN 17 6 - 20 mg/dL    Creatinine 0.9 0.7 - 1.2 mg/dL    GFR Non-African American >60 >=60 mL/min/1.73    GFR African American >60     Calcium 9.6 8.6 - 10.2 mg/dL   Lactic Acid   Result Value Ref Range    Lactic Acid 0.9 0.5 - 2.2 mmol/L       Radiology:  XR FOOT RIGHT (MIN 3 VIEWS)   Final Result   No acute osseous abnormality. No abnormal soft tissue gas. No evidence of   osteomyelitis.             ------------------------- NURSING NOTES & VITALS REVIEWED ---------------------------  Date / Time Roomed:  8/16/2022 11:07 AM  ED Bed Assignment:  19/19    The nursing notes within the ED encounter and vital signs as below have been reviewed. Patient Vitals for the past 8 hrs:   BP Temp Temp src Pulse Resp SpO2   08/16/22 1745 (!) 158/94 97.5 °F (36.4 °C) Oral 58 18 97 %       Oxygen Saturation Interpretation: Normal    -------------------------------------- ED COURSE & MEDS GIVEN ----------------------------------  ED Course as of 08/17/22 0028   Tue Aug 16, 2022   1211   ATTENDING PROVIDER ATTESTATION:     I have personally performed and/or participated in the history, exam, medical decision making, and procedures and agree with all pertinent clinical information unless otherwise noted. I have also reviewed and agree with the past medical, family and social history unless otherwise noted. I have discussed this patient in detail with the resident and provided the instruction and education regarding the evidence-based evaluation and treatment of wound check. History: Patient with a history of diabetes presents to the ED for evaluation. Patient had recently stepped on a nail. Was on antibiotic therapy and then seen again and placed on a stronger antibiotic. States that he is still having persistent pain. He also reports swelling to the area. Patient is a diabetic but states he does keep his sugars under control. Denies fevers or chills.   Denies any associated nausea, vomiting, or diaphoresis. Does not feel that the area has gotten worse but is just persistent. My findings: Marquis Lopez is a 40 y.o. male whom is in no distress. Physical exam reveals edema of the first toe on the right foot. There is a wound on the plantar surface over the IP joint. There is no purulent discharge coming from this area. The toe is warm to palpation and edematous. Tender to palpation. My plan: Symptomatic and supportive care. Appropriate labs and imaging. Electronically signed by Addison Barreto DO on 8/16/22 at 12:11 PM EDT       [MS]   1407 XR FOOT RIGHT (MIN 3 VIEWS)     IMPRESSION:  No acute osseous abnormality. No abnormal soft tissue gas. No evidence of  osteomyelitis. [PW]      ED Course User Index  [MS] Addison Barreto DO  [PW] Darilyn Bosworth, DO        Medications   HYDROcodone-acetaminophen (NORCO) 5-325 MG per tablet 1 tablet (1 tablet Oral Given 8/16/22 1132)       ------------------------------------------ PROGRESS NOTES ------------------------------------------  12:21 AM EDT  I have spoken with the patient and discussed todays results, in addition to providing specific details for the plan of care and counseling regarding the diagnosis and prognosis. Their questions are answered at this time and they are agreeable with the plan. I discussed at length with them reasons for immediate return here for re evaluation. They will followup with their PCP by calling their office tomorrow and were instructed to return to the ED for any new or worsening symptoms. --------------------------------- ADDITIONAL PROVIDER NOTES ---------------------------------  At this time the patient is without objective evidence of an acute process requiring hospitalization or inpatient management. They have remained hemodynamically stable throughout their entire ED visit and are stable for discharge with outpatient follow-up.      The plan has been discussed in detail and they are aware of the specific conditions for emergent return, as well as the importance of follow-up. Discharge Medication List as of 8/16/2022  5:51 PM          Diagnosis:  1. Puncture wound of toe of right foot, initial encounter        Disposition:  Patient's disposition: Discharge to home  Patient's condition is stable. Latoya Traylor DO       *NOTE: This report was transcribed using voice recognition software. Every effort was made to ensure accuracy; however, inadvertent computerized transcription errors may be present.        Latoya Traylor DO  Resident  08/17/22 5817

## 2022-08-16 NOTE — Clinical Note
Gina May was seen and treated in our emergency department on 8/16/2022. He may return to work on 08/18/2022. If you have any questions or concerns, please don't hesitate to call.       Macho Prabhakar, DO

## 2022-08-21 LAB
BLOOD CULTURE, ROUTINE: NORMAL
CULTURE, BLOOD 2: NORMAL

## 2022-08-27 ENCOUNTER — HOSPITAL ENCOUNTER (EMERGENCY)
Age: 37
Discharge: HOME | End: 2022-08-27
Attending: EMERGENCY MEDICINE
Payer: MEDICAID

## 2022-08-27 VITALS
OXYGEN SATURATION: 98 % | WEIGHT: 315 LBS | BODY MASS INDEX: 44.1 KG/M2 | DIASTOLIC BLOOD PRESSURE: 91 MMHG | TEMPERATURE: 97.3 F | HEIGHT: 71 IN | RESPIRATION RATE: 16 BRPM | HEART RATE: 88 BPM | SYSTOLIC BLOOD PRESSURE: 137 MMHG

## 2022-08-27 DIAGNOSIS — J20.9 ACUTE BRONCHITIS, UNSPECIFIED ORGANISM: Primary | ICD-10-CM

## 2022-08-27 LAB — SARS-COV-2, NAAT: NOT DETECTED

## 2022-08-27 PROCEDURE — 87635 SARS-COV-2 COVID-19 AMP PRB: CPT

## 2022-08-27 PROCEDURE — 99283 EMERGENCY DEPT VISIT LOW MDM: CPT

## 2022-08-27 RX ORDER — BROMPHENIRAMINE MALEATE, PSEUDOEPHEDRINE HYDROCHLORIDE, AND DEXTROMETHORPHAN HYDROBROMIDE 2; 30; 10 MG/5ML; MG/5ML; MG/5ML
5 SYRUP ORAL 4 TIMES DAILY PRN
Qty: 120 ML | Refills: 0 | Status: SHIPPED | OUTPATIENT
Start: 2022-08-27 | End: 2022-10-24

## 2022-08-27 RX ORDER — DOXYCYCLINE HYCLATE 100 MG
100 TABLET ORAL 2 TIMES DAILY
Qty: 20 TABLET | Refills: 0 | Status: SHIPPED | OUTPATIENT
Start: 2022-08-27 | End: 2022-09-01 | Stop reason: HOSPADM

## 2022-08-27 RX ORDER — METHYLPREDNISOLONE 4 MG/1
TABLET ORAL
Qty: 1 KIT | Refills: 0 | Status: SHIPPED | OUTPATIENT
Start: 2022-08-27 | End: 2022-09-02

## 2022-08-27 ASSESSMENT — PAIN - FUNCTIONAL ASSESSMENT: PAIN_FUNCTIONAL_ASSESSMENT: 0-10

## 2022-08-27 ASSESSMENT — ENCOUNTER SYMPTOMS
EYE DISCHARGE: 0
DIARRHEA: 0
SORE THROAT: 0
COUGH: 1
EYE PAIN: 0
WHEEZING: 0
SHORTNESS OF BREATH: 0
VOMITING: 0
EYE REDNESS: 0
SINUS PRESSURE: 0
BACK PAIN: 0
ABDOMINAL PAIN: 1
NAUSEA: 1
RHINORRHEA: 1

## 2022-08-27 ASSESSMENT — PAIN DESCRIPTION - LOCATION: LOCATION: ABDOMEN;HEAD

## 2022-08-27 ASSESSMENT — PAIN DESCRIPTION - PAIN TYPE: TYPE: ACUTE PAIN

## 2022-08-27 ASSESSMENT — PAIN DESCRIPTION - ONSET: ONSET: ON-GOING

## 2022-08-27 ASSESSMENT — PAIN SCALES - GENERAL: PAINLEVEL_OUTOF10: 5

## 2022-08-27 ASSESSMENT — PAIN DESCRIPTION - DESCRIPTORS: DESCRIPTORS: CRAMPING

## 2022-08-27 ASSESSMENT — PAIN DESCRIPTION - FREQUENCY: FREQUENCY: CONTINUOUS

## 2022-08-27 NOTE — Clinical Note
Steve Culp was seen and treated in our emergency department on 8/27/2022. He may return to work on 08/29/2022. If you have any questions or concerns, please don't hesitate to call.       Ron Bustamante MD

## 2022-08-27 NOTE — ED PROVIDER NOTES
The history is provided by the patient. Illness   The current episode started yesterday. The onset was sudden. The problem is moderate. Associated symptoms include abdominal pain, nausea, congestion, headaches, rhinorrhea, muscle aches and cough. Pertinent negatives include no fever, no diarrhea, no vomiting, no ear pain, no sore throat, no wheezing, no rash, no eye discharge, no eye pain and no eye redness. Review of Systems   Constitutional:  Negative for chills and fever. HENT:  Positive for congestion and rhinorrhea. Negative for ear pain, sinus pressure and sore throat. Eyes:  Negative for pain, discharge and redness. Respiratory:  Positive for cough. Negative for shortness of breath and wheezing. Cardiovascular:  Negative for chest pain. Gastrointestinal:  Positive for abdominal pain and nausea. Negative for diarrhea and vomiting. Genitourinary:  Negative for dysuria and frequency. Musculoskeletal:  Negative for arthralgias and back pain. Skin:  Negative for rash and wound. Neurological:  Positive for headaches. Negative for weakness. Hematological:  Negative for adenopathy. All other systems reviewed and are negative. Physical Exam  Vitals and nursing note reviewed. Constitutional:       Appearance: He is well-developed. HENT:      Head: Normocephalic and atraumatic. Jaw: No trismus. Right Ear: Hearing and external ear normal. Tympanic membrane is retracted. Left Ear: Hearing and external ear normal. Tympanic membrane is retracted. Nose: Mucosal edema and congestion present. Right Sinus: No maxillary sinus tenderness or frontal sinus tenderness. Left Sinus: No maxillary sinus tenderness or frontal sinus tenderness. Mouth/Throat:      Pharynx: Uvula midline. No uvula swelling. Eyes:      General: Lids are normal.      Conjunctiva/sclera: Conjunctivae normal.      Pupils: Pupils are equal, round, and reactive to light. Cardiovascular:      Rate and Rhythm: Normal rate and regular rhythm. Heart sounds: Normal heart sounds. No murmur heard. Pulmonary:      Effort: Pulmonary effort is normal.      Breath sounds: Normal breath sounds. Abdominal:      General: Bowel sounds are normal.      Palpations: Abdomen is soft. Abdomen is not rigid. Tenderness: There is no abdominal tenderness. There is no guarding or rebound. Musculoskeletal:      Cervical back: Normal range of motion and neck supple. Skin:     General: Skin is warm and dry. Findings: No abrasion or rash. Neurological:      Mental Status: He is alert and oriented to person, place, and time. GCS: GCS eye subscore is 4. GCS verbal subscore is 5. GCS motor subscore is 6. Cranial Nerves: No cranial nerve deficit. Sensory: No sensory deficit. Coordination: Coordination normal.      Gait: Gait normal.        Procedures     MDM          --------------------------------------------- PAST HISTORY ---------------------------------------------  Past Medical History:  has a past medical history of Allergic rhinitis, Chronic back pain, Depression, and Hypertension. Past Surgical History:  has a past surgical history that includes Abscess Drainage. Social History:  reports that he has been smoking cigarettes. He started smoking about 25 years ago. He has a 7.50 pack-year smoking history. He has never used smokeless tobacco. He reports current alcohol use. He reports that he does not use drugs. Family History: family history is not on file. The patients home medications have been reviewed. Allergies: Patient has no known allergies.     -------------------------------------------------- RESULTS -------------------------------------------------  Labs:  Results for orders placed or performed during the hospital encounter of 08/27/22   COVID-19, Rapid    Specimen: Nasopharyngeal Swab   Result Value Ref Range    SARS-CoV-2, NAAT Not unspecified organism        Disposition:  Patient's disposition: Discharge to home  Patient's condition is stable.                   Jeremias Cat MD  08/27/22 2873

## 2022-09-01 ENCOUNTER — HOSPITAL ENCOUNTER (EMERGENCY)
Age: 37
Discharge: HOME | End: 2022-09-01
Attending: EMERGENCY MEDICINE
Payer: MEDICAID

## 2022-09-01 VITALS
HEART RATE: 78 BPM | RESPIRATION RATE: 18 BRPM | TEMPERATURE: 97.3 F | SYSTOLIC BLOOD PRESSURE: 124 MMHG | BODY MASS INDEX: 44.1 KG/M2 | HEIGHT: 71 IN | WEIGHT: 315 LBS | DIASTOLIC BLOOD PRESSURE: 76 MMHG | OXYGEN SATURATION: 98 %

## 2022-09-01 DIAGNOSIS — K21.9 GASTROESOPHAGEAL REFLUX DISEASE, UNSPECIFIED WHETHER ESOPHAGITIS PRESENT: Primary | ICD-10-CM

## 2022-09-01 PROCEDURE — 6370000000 HC RX 637 (ALT 250 FOR IP): Performed by: EMERGENCY MEDICINE

## 2022-09-01 PROCEDURE — 99283 EMERGENCY DEPT VISIT LOW MDM: CPT

## 2022-09-01 RX ORDER — PANTOPRAZOLE SODIUM 20 MG/1
20 TABLET, DELAYED RELEASE ORAL DAILY
Qty: 30 TABLET | Refills: 0 | Status: SHIPPED | OUTPATIENT
Start: 2022-09-01 | End: 2022-09-18 | Stop reason: SDUPTHER

## 2022-09-01 RX ADMIN — MAGNESIUM HYDROXIDE/ALUMINUM HYDROXICE/SIMETHICONE: 120; 1200; 1200 SUSPENSION ORAL at 09:06

## 2022-09-01 ASSESSMENT — PAIN SCALES - GENERAL: PAINLEVEL_OUTOF10: 5

## 2022-09-01 ASSESSMENT — PAIN DESCRIPTION - ONSET: ONSET: ON-GOING

## 2022-09-01 ASSESSMENT — PAIN - FUNCTIONAL ASSESSMENT: PAIN_FUNCTIONAL_ASSESSMENT: 0-10

## 2022-09-01 ASSESSMENT — PAIN DESCRIPTION - FREQUENCY: FREQUENCY: CONTINUOUS

## 2022-09-01 ASSESSMENT — PAIN DESCRIPTION - DESCRIPTORS: DESCRIPTORS: BURNING

## 2022-09-01 ASSESSMENT — PAIN DESCRIPTION - ORIENTATION: ORIENTATION: MID;UPPER

## 2022-09-01 ASSESSMENT — PAIN DESCRIPTION - LOCATION: LOCATION: ABDOMEN;THROAT

## 2022-09-01 ASSESSMENT — PAIN DESCRIPTION - PAIN TYPE: TYPE: ACUTE PAIN

## 2022-09-01 NOTE — Clinical Note
Steve Culp was seen and treated in our emergency department on 9/1/2022. He may return to work on 09/02/2022. If you have any questions or concerns, please don't hesitate to call.       Ron Bustamante MD

## 2022-09-01 NOTE — ED PROVIDER NOTES
Interpretation: Normal      ---------------------------------------------------PHYSICAL EXAM--------------------------------------      Constitutional/General: Alert and oriented x3, well appearing, non toxic in NAD  Head: NC/AT  Eyes: PERRL, EOMI  Mouth: Oropharynx clear, handling secretions, no trismus  Neck: Supple, full ROM, no meningeal signs  Pulmonary: Lungs clear to auscultation bilaterally, no wheezes, rales, or rhonchi. Not in respiratory distress  Cardiovascular:  Regular rate and rhythm, no murmurs, gallops, or rubs. 2+ distal pulses  Abdomen: Soft, non tender, non distended,   Extremities: Moves all extremities x 4. Warm and well perfused  Skin: warm and dry without rash  Neurologic: GCS 15,  Psych: Normal Affect      ------------------------------ ED COURSE/MEDICAL DECISION MAKING----------------------  Medications   aluminum & magnesium hydroxide-simethicone (MAALOX) 30 mL, lidocaine viscous hcl (XYLOCAINE) 5 mL (GI COCKTAIL) ( Oral Given 9/1/22 0906)         Medical Decision Making:    Patient feeling better after medication. Stop doxycycline. Follow-up with PCP as needed. Work excuse given. Patient's Medications   New Prescriptions    PANTOPRAZOLE (PROTONIX) 20 MG TABLET    Take 1 tablet by mouth daily   Previous Medications    ALBUTEROL SULFATE HFA (VENTOLIN HFA) 108 (90 BASE) MCG/ACT INHALER    Inhale 2 puffs into the lungs 4 times daily as needed for Wheezing    ARIPIPRAZOLE (ABILIFY) 5 MG TABLET    Take 5 mg by mouth nightly    BLOOD GLUCOSE MONITOR KIT AND SUPPLIES    Dispense sufficient amount for indicated testing frequency plus additional to accommodate PRN testing needs. Dispense all needed supplies to include: monitor, strips, lancing device, lancets, control solutions, alcohol swabs. E11.9    BLOOD GLUCOSE MONITOR STRIPS    Test 4 times a day & as needed for symptoms of irregular blood glucose.  Dispense sufficient amount for indicated testing frequency plus additional to accommodate PRN testing needs. E11.9    BROMPHENIRAMINE-PSEUDOEPHEDRINE-DM 2-30-10 MG/5ML SYRUP    Take 5 mLs by mouth 4 times daily as needed for Cough or Congestion    CLONAZEPAM (KLONOPIN) 0.5 MG TABLET    TAKE 1 AND 1/2 TABLETS BY MOUTH DAILY AS NEEDED    ESCITALOPRAM (LEXAPRO) 20 MG TABLET    TAKE 1 TABLET BY MOUTH DAILY    GABAPENTIN (NEURONTIN) 100 MG CAPSULE    Take 100 mg by mouth in the morning and 100 mg at noon and 100 mg before bedtime. 3 tablets q am.    GLIMEPIRIDE (AMARYL) 2 MG TABLET    Take 1 tablet by mouth every morning (before breakfast)    HYDROXYZINE (VISTARIL) 50 MG CAPSULE    TAKE 1 CAPSULE BY MOUTH TWICE DAILY AS NEEDED    INSULIN GLARGINE (LANTUS SOLOSTAR) 100 UNIT/ML INJECTION PEN    Inject 10 Units into the skin nightly    INSULIN PEN NEEDLE (PEN NEEDLES) 32G X 5 MM MISC    1 each by Does not apply route daily    LANCETS (ONETOUCH DELICA PLUS [id number]) MISC    USE TO TEST FOUR TIMES DAILY AS DIRECTED    METHYLPREDNISOLONE (MEDROL, ADILIA,) 4 MG TABLET    Take by mouth. NAPROXEN (NAPROSYN) 500 MG TABLET    Take 1 tablet by mouth in the morning and 1 tablet before bedtime. Do all this for 10 days. SHARPS CONTAINER    1 each by Does not apply route as needed (needles)    TOPIRAMATE (TOPAMAX) 25 MG TABLET    TAKE 2 TABLETS BY MOUTH AT BEDTIME    TRAZODONE (DESYREL) 150 MG TABLET    Take 2 tablets by mouth nightly   Modified Medications    No medications on file   Discontinued Medications    DOXYCYCLINE HYCLATE (VIBRA-TABS) 100 MG TABLET    Take 1 tablet by mouth 2 times daily for 10 days           Counseling: The emergency provider has spoken with the patient and discussed todays results, in addition to providing specific details for the plan of care and counseling regarding the diagnosis and prognosis.   Questions are answered at this time and they are agreeable with the plan.      --------------------------------- IMPRESSION AND DISPOSITION ---------------------------------    IMPRESSION  1. Gastroesophageal reflux disease, unspecified whether esophagitis present        DISPOSITION  Disposition: Discharge to home  Patient condition is good                  Lobo Acosta MD  09/01/22 0473

## 2022-09-04 ENCOUNTER — HOSPITAL ENCOUNTER (EMERGENCY)
Age: 37
Discharge: HOME | End: 2022-09-04
Payer: MEDICAID

## 2022-09-04 ENCOUNTER — APPOINTMENT (OUTPATIENT)
Dept: CT IMAGING | Age: 37
End: 2022-09-04
Payer: MEDICAID

## 2022-09-04 VITALS
SYSTOLIC BLOOD PRESSURE: 129 MMHG | RESPIRATION RATE: 16 BRPM | OXYGEN SATURATION: 95 % | HEART RATE: 66 BPM | DIASTOLIC BLOOD PRESSURE: 94 MMHG | TEMPERATURE: 98 F

## 2022-09-04 DIAGNOSIS — R11.10 NON-INTRACTABLE VOMITING, PRESENCE OF NAUSEA NOT SPECIFIED, UNSPECIFIED VOMITING TYPE: ICD-10-CM

## 2022-09-04 DIAGNOSIS — R10.9 LEFT SIDED ABDOMINAL PAIN: Primary | ICD-10-CM

## 2022-09-04 LAB
ALBUMIN SERPL-MCNC: 4.7 G/DL (ref 3.5–5.2)
ALP BLD-CCNC: 129 U/L (ref 40–129)
ALT SERPL-CCNC: 120 U/L (ref 0–40)
AMPHETAMINE SCREEN, URINE: NOT DETECTED
ANION GAP SERPL CALCULATED.3IONS-SCNC: 8 MMOL/L (ref 7–16)
AST SERPL-CCNC: 42 U/L (ref 0–39)
BACTERIA: ABNORMAL /HPF
BARBITURATE SCREEN URINE: NOT DETECTED
BASOPHILS ABSOLUTE: 0.03 E9/L (ref 0–0.2)
BASOPHILS RELATIVE PERCENT: 0.3 % (ref 0–2)
BENZODIAZEPINE SCREEN, URINE: NOT DETECTED
BILIRUB SERPL-MCNC: <0.2 MG/DL (ref 0–1.2)
BILIRUBIN URINE: NEGATIVE
BLOOD, URINE: NEGATIVE
BUN BLDV-MCNC: 11 MG/DL (ref 6–20)
CALCIUM SERPL-MCNC: 9.5 MG/DL (ref 8.6–10.2)
CANNABINOID SCREEN URINE: POSITIVE
CHLORIDE BLD-SCNC: 102 MMOL/L (ref 98–107)
CLARITY: CLEAR
CO2: 25 MMOL/L (ref 22–29)
COCAINE METABOLITE SCREEN URINE: NOT DETECTED
COLOR: YELLOW
CREAT SERPL-MCNC: 0.9 MG/DL (ref 0.7–1.2)
EOSINOPHILS ABSOLUTE: 0.18 E9/L (ref 0.05–0.5)
EOSINOPHILS RELATIVE PERCENT: 1.9 % (ref 0–6)
EPITHELIAL CELLS, UA: ABNORMAL /HPF
FENTANYL SCREEN, URINE: NOT DETECTED
GFR AFRICAN AMERICAN: >60
GFR NON-AFRICAN AMERICAN: >60 ML/MIN/1.73
GLUCOSE BLD-MCNC: 258 MG/DL (ref 74–99)
GLUCOSE URINE: >=1000 MG/DL
HCT VFR BLD CALC: 49.3 % (ref 37–54)
HEMOGLOBIN: 16.3 G/DL (ref 12.5–16.5)
IMMATURE GRANULOCYTES #: 0.06 E9/L
IMMATURE GRANULOCYTES %: 0.6 % (ref 0–5)
KETONES, URINE: NEGATIVE MG/DL
LACTIC ACID: 1.1 MMOL/L (ref 0.5–2.2)
LEUKOCYTE ESTERASE, URINE: NEGATIVE
LIPASE: 53 U/L (ref 13–60)
LYMPHOCYTES ABSOLUTE: 2.93 E9/L (ref 1.5–4)
LYMPHOCYTES RELATIVE PERCENT: 30.6 % (ref 20–42)
Lab: ABNORMAL
MCH RBC QN AUTO: 27.6 PG (ref 26–35)
MCHC RBC AUTO-ENTMCNC: 33.1 % (ref 32–34.5)
MCV RBC AUTO: 83.4 FL (ref 80–99.9)
METHADONE SCREEN, URINE: NOT DETECTED
MONOCYTES ABSOLUTE: 0.69 E9/L (ref 0.1–0.95)
MONOCYTES RELATIVE PERCENT: 7.2 % (ref 2–12)
NEUTROPHILS ABSOLUTE: 5.68 E9/L (ref 1.8–7.3)
NEUTROPHILS RELATIVE PERCENT: 59.4 % (ref 43–80)
NITRITE, URINE: NEGATIVE
OPIATE SCREEN URINE: NOT DETECTED
OXYCODONE URINE: NOT DETECTED
PDW BLD-RTO: 12.3 FL (ref 11.5–15)
PH UA: 7 (ref 5–9)
PHENCYCLIDINE SCREEN URINE: NOT DETECTED
PLATELET # BLD: 276 E9/L (ref 130–450)
PMV BLD AUTO: 9.2 FL (ref 7–12)
POTASSIUM REFLEX MAGNESIUM: 4.9 MMOL/L (ref 3.5–5)
PROTEIN UA: NEGATIVE MG/DL
RBC # BLD: 5.91 E12/L (ref 3.8–5.8)
RBC UA: ABNORMAL /HPF (ref 0–2)
SODIUM BLD-SCNC: 135 MMOL/L (ref 132–146)
SPECIFIC GRAVITY UA: 1.02 (ref 1–1.03)
TOTAL PROTEIN: 8.1 G/DL (ref 6.4–8.3)
UROBILINOGEN, URINE: 0.2 E.U./DL
WBC # BLD: 9.6 E9/L (ref 4.5–11.5)
WBC UA: ABNORMAL /HPF (ref 0–5)

## 2022-09-04 PROCEDURE — 99285 EMERGENCY DEPT VISIT HI MDM: CPT

## 2022-09-04 PROCEDURE — 83605 ASSAY OF LACTIC ACID: CPT

## 2022-09-04 PROCEDURE — 80307 DRUG TEST PRSMV CHEM ANLYZR: CPT

## 2022-09-04 PROCEDURE — 74177 CT ABD & PELVIS W/CONTRAST: CPT

## 2022-09-04 PROCEDURE — 83690 ASSAY OF LIPASE: CPT

## 2022-09-04 PROCEDURE — 80053 COMPREHEN METABOLIC PANEL: CPT

## 2022-09-04 PROCEDURE — 96361 HYDRATE IV INFUSION ADD-ON: CPT

## 2022-09-04 PROCEDURE — 96375 TX/PRO/DX INJ NEW DRUG ADDON: CPT

## 2022-09-04 PROCEDURE — 6360000002 HC RX W HCPCS: Performed by: PHYSICIAN ASSISTANT

## 2022-09-04 PROCEDURE — 81001 URINALYSIS AUTO W/SCOPE: CPT

## 2022-09-04 PROCEDURE — 96372 THER/PROPH/DIAG INJ SC/IM: CPT

## 2022-09-04 PROCEDURE — 85025 COMPLETE CBC W/AUTO DIFF WBC: CPT

## 2022-09-04 PROCEDURE — 6360000004 HC RX CONTRAST MEDICATION: Performed by: RADIOLOGY

## 2022-09-04 PROCEDURE — 6360000002 HC RX W HCPCS

## 2022-09-04 PROCEDURE — 96374 THER/PROPH/DIAG INJ IV PUSH: CPT

## 2022-09-04 PROCEDURE — 36415 COLL VENOUS BLD VENIPUNCTURE: CPT

## 2022-09-04 PROCEDURE — 2580000003 HC RX 258: Performed by: PHYSICIAN ASSISTANT

## 2022-09-04 RX ORDER — DICYCLOMINE HYDROCHLORIDE 10 MG/1
20 CAPSULE ORAL
Qty: 15 CAPSULE | Refills: 0 | Status: SHIPPED | OUTPATIENT
Start: 2022-09-04 | End: 2023-09-04

## 2022-09-04 RX ORDER — KETOROLAC TROMETHAMINE 30 MG/ML
INJECTION, SOLUTION INTRAMUSCULAR; INTRAVENOUS
Status: COMPLETED
Start: 2022-09-04 | End: 2022-09-04

## 2022-09-04 RX ORDER — PROMETHAZINE HYDROCHLORIDE 25 MG/1
25 TABLET ORAL 4 TIMES DAILY PRN
Qty: 20 TABLET | Refills: 0 | Status: SHIPPED | OUTPATIENT
Start: 2022-09-04 | End: 2022-09-11

## 2022-09-04 RX ORDER — 0.9 % SODIUM CHLORIDE 0.9 %
1000 INTRAVENOUS SOLUTION INTRAVENOUS ONCE
Status: COMPLETED | OUTPATIENT
Start: 2022-09-04 | End: 2022-09-04

## 2022-09-04 RX ORDER — PROMETHAZINE HYDROCHLORIDE 25 MG/ML
12.5 INJECTION, SOLUTION INTRAMUSCULAR; INTRAVENOUS ONCE
Status: COMPLETED | OUTPATIENT
Start: 2022-09-04 | End: 2022-09-04

## 2022-09-04 RX ORDER — MORPHINE SULFATE 4 MG/ML
4 INJECTION, SOLUTION INTRAMUSCULAR; INTRAVENOUS ONCE
Status: COMPLETED | OUTPATIENT
Start: 2022-09-04 | End: 2022-09-04

## 2022-09-04 RX ORDER — ONDANSETRON 2 MG/ML
4 INJECTION INTRAMUSCULAR; INTRAVENOUS ONCE
Status: COMPLETED | OUTPATIENT
Start: 2022-09-04 | End: 2022-09-04

## 2022-09-04 RX ORDER — KETOROLAC TROMETHAMINE 30 MG/ML
30 INJECTION, SOLUTION INTRAMUSCULAR; INTRAVENOUS ONCE
Status: COMPLETED | OUTPATIENT
Start: 2022-09-04 | End: 2022-09-04

## 2022-09-04 RX ORDER — ONDANSETRON 2 MG/ML
INJECTION INTRAMUSCULAR; INTRAVENOUS
Status: COMPLETED
Start: 2022-09-04 | End: 2022-09-04

## 2022-09-04 RX ADMIN — ONDANSETRON 4 MG: 2 INJECTION INTRAMUSCULAR; INTRAVENOUS at 12:00

## 2022-09-04 RX ADMIN — PROMETHAZINE HYDROCHLORIDE 12.5 MG: 25 INJECTION INTRAMUSCULAR; INTRAVENOUS at 13:17

## 2022-09-04 RX ADMIN — SODIUM CHLORIDE 1000 ML: 9 INJECTION, SOLUTION INTRAVENOUS at 12:00

## 2022-09-04 RX ADMIN — KETOROLAC TROMETHAMINE 30 MG: 30 INJECTION, SOLUTION INTRAMUSCULAR; INTRAVENOUS at 12:00

## 2022-09-04 RX ADMIN — MORPHINE SULFATE 4 MG: 4 INJECTION, SOLUTION INTRAMUSCULAR; INTRAVENOUS at 15:02

## 2022-09-04 RX ADMIN — IOPAMIDOL 50 ML: 755 INJECTION, SOLUTION INTRAVENOUS at 14:45

## 2022-09-04 ASSESSMENT — PAIN SCALES - GENERAL
PAINLEVEL_OUTOF10: 9
PAINLEVEL_OUTOF10: 1

## 2022-09-04 ASSESSMENT — PAIN DESCRIPTION - LOCATION: LOCATION: ABDOMEN

## 2022-09-04 ASSESSMENT — PAIN - FUNCTIONAL ASSESSMENT
PAIN_FUNCTIONAL_ASSESSMENT: 0-10
PAIN_FUNCTIONAL_ASSESSMENT: 0-10

## 2022-09-04 NOTE — DISCHARGE INSTRUCTIONS
Colorectal submucosal fat, potentially related to chronic inflammation. Diverticulosis. Mildly enlarged periportal lymph nodes, nonspecific.

## 2022-09-04 NOTE — ED PROVIDER NOTES
[facility]   HPI:  9/4/22, Time: 11:38 AM EDT         Marquis Lopez is a 40 y.o. male presenting to the ED for abdominal pain , beginning 2-3 months  ago. The complaint has been persistent, moderate in severity, and worsened by eating or drinking . Patient comes in with complaint of chronic abdominal pain over the last 2 to 3 months. He has had nausea vomiting in the mornings the last 5 days. He complains of some chills. Patient also having diarrhea denies any black tarry or bloody stools. Patient was recently on doxycycline for bronchitis. .  Patient states he has a history of gallbladder disease that he did not follow up on in the past.    Review of Systems:   A complete review of systems was performed and pertinent positives and negatives are stated within HPI, all other systems reviewed and are negative.          --------------------------------------------- PAST HISTORY ---------------------------------------------  Past Medical History:  has a past medical history of Allergic rhinitis, Chronic back pain, Depression, and Hypertension. Past Surgical History:  has a past surgical history that includes Abscess Drainage. Social History:  reports that he has been smoking cigarettes. He started smoking about 25 years ago. He has a 7.50 pack-year smoking history. He has never used smokeless tobacco. He reports current alcohol use. He reports that he does not use drugs. Family History: family history is not on file. The patients home medications have been reviewed. Allergies: Patient has no known allergies.     -------------------------------------------------- RESULTS -------------------------------------------------  All laboratory and radiology results have been personally reviewed by myself   LABS:  Results for orders placed or performed during the hospital encounter of 09/04/22   CBC with Auto Differential   Result Value Ref Range    WBC 9.6 4.5 - 11.5 E9/L    RBC 5.91 (H) 3.80 - 5.80 E12/L    Hemoglobin 16.3 12.5 - 16.5 g/dL    Hematocrit 49.3 37.0 - 54.0 %    MCV 83.4 80.0 - 99.9 fL    MCH 27.6 26.0 - 35.0 pg    MCHC 33.1 32.0 - 34.5 %    RDW 12.3 11.5 - 15.0 fL    Platelets 931 261 - 731 E9/L    MPV 9.2 7.0 - 12.0 fL    Neutrophils % 59.4 43.0 - 80.0 %    Immature Granulocytes % 0.6 0.0 - 5.0 %    Lymphocytes % 30.6 20.0 - 42.0 %    Monocytes % 7.2 2.0 - 12.0 %    Eosinophils % 1.9 0.0 - 6.0 %    Basophils % 0.3 0.0 - 2.0 %    Neutrophils Absolute 5.68 1.80 - 7.30 E9/L    Immature Granulocytes # 0.06 E9/L    Lymphocytes Absolute 2.93 1.50 - 4.00 E9/L    Monocytes Absolute 0.69 0.10 - 0.95 E9/L    Eosinophils Absolute 0.18 0.05 - 0.50 E9/L    Basophils Absolute 0.03 0.00 - 0.20 E9/L   Comprehensive Metabolic Panel w/ Reflex to MG   Result Value Ref Range    Sodium 135 132 - 146 mmol/L    Potassium reflex Magnesium 4.9 3.5 - 5.0 mmol/L    Chloride 102 98 - 107 mmol/L    CO2 25 22 - 29 mmol/L    Anion Gap 8 7 - 16 mmol/L    Glucose 258 (H) 74 - 99 mg/dL    BUN 11 6 - 20 mg/dL    Creatinine 0.9 0.7 - 1.2 mg/dL    GFR Non-African American >60 >=60 mL/min/1.73    GFR African American >60     Calcium 9.5 8.6 - 10.2 mg/dL    Total Protein 8.1 6.4 - 8.3 g/dL    Albumin 4.7 3.5 - 5.2 g/dL    Total Bilirubin <0.2 0.0 - 1.2 mg/dL    Alkaline Phosphatase 129 40 - 129 U/L     (H) 0 - 40 U/L    AST 42 (H) 0 - 39 U/L   Lipase   Result Value Ref Range    Lipase 53 13 - 60 U/L   Lactic Acid   Result Value Ref Range    Lactic Acid 1.1 0.5 - 2.2 mmol/L   Urinalysis with Microscopic   Result Value Ref Range    Color, UA Yellow Straw/Yellow    Clarity, UA Clear Clear    Glucose, Ur >=1000 (A) Negative mg/dL    Bilirubin Urine Negative Negative    Ketones, Urine Negative Negative mg/dL    Specific Gravity, UA 1.020 1.005 - 1.030    Blood, Urine Negative Negative    pH, UA 7.0 5.0 - 9.0    Protein, UA Negative Negative mg/dL    Urobilinogen, Urine 0.2 <2.0 E.U./dL    Nitrite, Urine Negative Negative Leukocyte Esterase, Urine Negative Negative    WBC, UA NONE 0 - 5 /HPF    RBC, UA NONE 0 - 2 /HPF    Epithelial Cells, UA RARE /HPF    Bacteria, UA RARE (A) None Seen /HPF   Urine Drug Screen   Result Value Ref Range    Amphetamine Screen, Urine NOT DETECTED Negative <1000 ng/mL    Barbiturate Screen, Ur NOT DETECTED Negative < 200 ng/mL    Benzodiazepine Screen, Urine NOT DETECTED Negative < 200 ng/mL    Cannabinoid Scrn, Ur POSITIVE (A) Negative < 50ng/mL    Cocaine Metabolite Screen, Urine NOT DETECTED Negative < 300 ng/mL    Opiate Scrn, Ur NOT DETECTED Negative < 300ng/mL    PCP Screen, Urine NOT DETECTED Negative < 25 ng/mL    Methadone Screen, Urine NOT DETECTED Negative <300 ng/mL    Oxycodone Urine NOT DETECTED Negative <100 ng/mL    FENTANYL SCREEN, URINE NOT DETECTED Negative <1 ng/mL    Drug Screen Comment: see below        RADIOLOGY:  Interpreted by Radiologist.  CT ABDOMEN PELVIS W IV CONTRAST Additional Contrast? None   Final Result   Colorectal submucosal fat, potentially related to chronic inflammation. Diverticulosis. Mildly enlarged periportal lymph nodes, nonspecific.             ------------------------- NURSING NOTES AND VITALS REVIEWED ---------------------------   The nursing notes within the ED encounter and vital signs as below have been reviewed. BP (!) 129/94   Pulse 66   Temp 98 °F (36.7 °C) (Oral)   Resp 16   SpO2 95%   Oxygen Saturation Interpretation: Normal      ---------------------------------------------------PHYSICAL EXAM--------------------------------------      Constitutional/General: Alert and oriented x3, well appearing, non toxic in NAD  Head: Normocephalic and atraumatic  Eyes: PERRL, EOMI  Mouth: Oropharynx clear, handling secretions, no trismus  Neck: Supple, full ROM,   Pulmonary: Lungs clear to auscultation bilaterally, no wheezes, rales, or rhonchi. Not in respiratory distress  Cardiovascular:  Regular rate and rhythm, no murmurs, gallops, or rubs.  2+ distal pulses  Abdomen: Soft, generalized tenderness greater left lower quadrant and periumbilical.  No guarding or rebound noted  Extremities: Moves all extremities x 4. Warm and well perfused  Skin: warm and dry without rash  Neurologic: GCS 15,  Psych: Normal Affect      ------------------------------ ED COURSE/MEDICAL DECISION MAKING----------------------  Medications   ondansetron (ZOFRAN) injection 4 mg (4 mg IntraVENous Given 9/4/22 1200)   0.9 % sodium chloride bolus (0 mLs IntraVENous Stopped 9/4/22 1500)   ketorolac (TORADOL) injection 30 mg (30 mg IntraVENous Given 9/4/22 1200)   promethazine (PHENERGAN) injection 12.5 mg (12.5 mg IntraMUSCular Given 9/4/22 1317)   morphine injection 4 mg (4 mg IntraVENous Given 9/4/22 1502)   iopamidol (ISOVUE-370) 76 % injection 50 mL (50 mLs IntraVENous Given 9/4/22 1445)         ED COURSE:   1300 no improvement with nausea vomiting after Zofran will give Phenergan at this time    1305 Patient updated patient does have history of Diabetes   1505 patient states his nausea and pain have resolved at this time we are awaiting CT results. 1540 patient updated on CT findings plan for p.o. challenge if tolerated patient will be discharged    Medical Decision Making:    Patient came in with complaint of neurolyse abdominal pain greater on the left. Been chronic in nature over the last 2 to 3 months. Started having nausea vomiting for the past 5 days in the mornings. States he has gallbladder disease. On CT there was just finding of chronic inflammatory changes diverticulosis with mild periportal lymph nodes patient tolerated p.o. challenge. He was discharged with Bentyl and Phenergan. Counseling: The emergency provider has spoken with the patient and discussed todays results, in addition to providing specific details for the plan of care and counseling regarding the diagnosis and prognosis.   Questions are answered at this time and they are agreeable with the plan.      --------------------------------- IMPRESSION AND DISPOSITION ---------------------------------    IMPRESSION  1. Left sided abdominal pain    2. Non-intractable vomiting, presence of nausea not specified, unspecified vomiting type        DISPOSITION  Disposition: Discharge to home  Patient condition is good      NOTE: This report was transcribed using voice recognition software.  Every effort was made to ensure accuracy; however, inadvertent computerized transcription errors may be present      John Miller [place]  09/04/22 [id number]

## 2022-09-06 ENCOUNTER — APPOINTMENT (OUTPATIENT)
Dept: CT IMAGING | Age: 37
End: 2022-09-06
Payer: MEDICAID

## 2022-09-06 ENCOUNTER — HOSPITAL ENCOUNTER (EMERGENCY)
Age: 37
Discharge: HOME | End: 2022-09-06
Attending: EMERGENCY MEDICINE
Payer: MEDICAID

## 2022-09-06 VITALS
TEMPERATURE: 97.9 F | BODY MASS INDEX: 47.42 KG/M2 | SYSTOLIC BLOOD PRESSURE: 123 MMHG | OXYGEN SATURATION: 96 % | WEIGHT: 315 LBS | HEART RATE: 63 BPM | DIASTOLIC BLOOD PRESSURE: 77 MMHG | RESPIRATION RATE: 18 BRPM

## 2022-09-06 DIAGNOSIS — F12.90 CANNABINOID HYPEREMESIS SYNDROME: Primary | ICD-10-CM

## 2022-09-06 DIAGNOSIS — R11.2 CANNABINOID HYPEREMESIS SYNDROME: Primary | ICD-10-CM

## 2022-09-06 LAB
ALBUMIN SERPL-MCNC: 4.9 G/DL (ref 3.5–5.2)
ALP BLD-CCNC: 112 U/L (ref 40–129)
ALT SERPL-CCNC: 122 U/L (ref 0–40)
AMORPHOUS: NORMAL
AMPHETAMINE SCREEN, URINE: NOT DETECTED
ANION GAP SERPL CALCULATED.3IONS-SCNC: 13 MMOL/L (ref 7–16)
AST SERPL-CCNC: 54 U/L (ref 0–39)
BACTERIA: NORMAL /HPF
BARBITURATE SCREEN URINE: NOT DETECTED
BASOPHILS ABSOLUTE: 0.03 E9/L (ref 0–0.2)
BASOPHILS RELATIVE PERCENT: 0.3 % (ref 0–2)
BENZODIAZEPINE SCREEN, URINE: NOT DETECTED
BILIRUB SERPL-MCNC: 0.3 MG/DL (ref 0–1.2)
BILIRUBIN DIRECT: <0.2 MG/DL (ref 0–0.3)
BILIRUBIN URINE: NEGATIVE
BILIRUBIN, INDIRECT: ABNORMAL MG/DL (ref 0–1)
BLOOD, URINE: NEGATIVE
BUN BLDV-MCNC: 12 MG/DL (ref 6–20)
CALCIUM SERPL-MCNC: 10.3 MG/DL (ref 8.6–10.2)
CANNABINOID SCREEN URINE: POSITIVE
CHLORIDE BLD-SCNC: 99 MMOL/L (ref 98–107)
CLARITY: CLEAR
CO2: 26 MMOL/L (ref 22–29)
COCAINE METABOLITE SCREEN URINE: NOT DETECTED
COLOR: YELLOW
CREAT SERPL-MCNC: 1 MG/DL (ref 0.7–1.2)
EOSINOPHILS ABSOLUTE: 0.18 E9/L (ref 0.05–0.5)
EOSINOPHILS RELATIVE PERCENT: 1.8 % (ref 0–6)
FENTANYL SCREEN, URINE: NOT DETECTED
GFR AFRICAN AMERICAN: >60
GFR NON-AFRICAN AMERICAN: >60 ML/MIN/1.73
GLUCOSE BLD-MCNC: 173 MG/DL (ref 74–99)
GLUCOSE URINE: NEGATIVE MG/DL
HCT VFR BLD CALC: 50.1 % (ref 37–54)
HEMOGLOBIN: 16.8 G/DL (ref 12.5–16.5)
IMMATURE GRANULOCYTES #: 0.05 E9/L
IMMATURE GRANULOCYTES %: 0.5 % (ref 0–5)
KETONES, URINE: NEGATIVE MG/DL
LACTIC ACID: 1 MMOL/L (ref 0.5–2.2)
LEUKOCYTE ESTERASE, URINE: NEGATIVE
LIPASE: 80 U/L (ref 13–60)
LYMPHOCYTES ABSOLUTE: 3.24 E9/L (ref 1.5–4)
LYMPHOCYTES RELATIVE PERCENT: 32.3 % (ref 20–42)
Lab: ABNORMAL
MAGNESIUM: 2.2 MG/DL (ref 1.6–2.6)
MCH RBC QN AUTO: 28 PG (ref 26–35)
MCHC RBC AUTO-ENTMCNC: 33.5 % (ref 32–34.5)
MCV RBC AUTO: 83.4 FL (ref 80–99.9)
METHADONE SCREEN, URINE: NOT DETECTED
MONOCYTES ABSOLUTE: 0.69 E9/L (ref 0.1–0.95)
MONOCYTES RELATIVE PERCENT: 6.9 % (ref 2–12)
NEUTROPHILS ABSOLUTE: 5.84 E9/L (ref 1.8–7.3)
NEUTROPHILS RELATIVE PERCENT: 58.2 % (ref 43–80)
NITRITE, URINE: NEGATIVE
OPIATE SCREEN URINE: NOT DETECTED
OXYCODONE URINE: POSITIVE
PDW BLD-RTO: 12.4 FL (ref 11.5–15)
PH UA: 8 (ref 5–9)
PHENCYCLIDINE SCREEN URINE: NOT DETECTED
PLATELET # BLD: 313 E9/L (ref 130–450)
PMV BLD AUTO: 8.7 FL (ref 7–12)
POTASSIUM SERPL-SCNC: 4.4 MMOL/L (ref 3.5–5)
PROTEIN UA: NORMAL MG/DL
RBC # BLD: 6.01 E12/L (ref 3.8–5.8)
RBC UA: NORMAL /HPF (ref 0–2)
SODIUM BLD-SCNC: 138 MMOL/L (ref 132–146)
SPECIFIC GRAVITY UA: 1.02 (ref 1–1.03)
TOTAL PROTEIN: 8.4 G/DL (ref 6.4–8.3)
TROPONIN, HIGH SENSITIVITY: <6 NG/L (ref 0–11)
UROBILINOGEN, URINE: 0.2 E.U./DL
WBC # BLD: 10 E9/L (ref 4.5–11.5)
WBC UA: NORMAL /HPF (ref 0–5)

## 2022-09-06 PROCEDURE — 80048 BASIC METABOLIC PNL TOTAL CA: CPT

## 2022-09-06 PROCEDURE — 83690 ASSAY OF LIPASE: CPT

## 2022-09-06 PROCEDURE — 93005 ELECTROCARDIOGRAM TRACING: CPT | Performed by: EMERGENCY MEDICINE

## 2022-09-06 PROCEDURE — 74176 CT ABD & PELVIS W/O CONTRAST: CPT

## 2022-09-06 PROCEDURE — 80076 HEPATIC FUNCTION PANEL: CPT

## 2022-09-06 PROCEDURE — 96374 THER/PROPH/DIAG INJ IV PUSH: CPT

## 2022-09-06 PROCEDURE — 83735 ASSAY OF MAGNESIUM: CPT

## 2022-09-06 PROCEDURE — 81001 URINALYSIS AUTO W/SCOPE: CPT

## 2022-09-06 PROCEDURE — 36415 COLL VENOUS BLD VENIPUNCTURE: CPT

## 2022-09-06 PROCEDURE — 6360000002 HC RX W HCPCS: Performed by: EMERGENCY MEDICINE

## 2022-09-06 PROCEDURE — 80307 DRUG TEST PRSMV CHEM ANLYZR: CPT

## 2022-09-06 PROCEDURE — 84484 ASSAY OF TROPONIN QUANT: CPT

## 2022-09-06 PROCEDURE — 83605 ASSAY OF LACTIC ACID: CPT

## 2022-09-06 PROCEDURE — 96361 HYDRATE IV INFUSION ADD-ON: CPT

## 2022-09-06 PROCEDURE — 99284 EMERGENCY DEPT VISIT MOD MDM: CPT

## 2022-09-06 PROCEDURE — 96375 TX/PRO/DX INJ NEW DRUG ADDON: CPT

## 2022-09-06 PROCEDURE — 2580000003 HC RX 258: Performed by: EMERGENCY MEDICINE

## 2022-09-06 PROCEDURE — 85025 COMPLETE CBC W/AUTO DIFF WBC: CPT

## 2022-09-06 RX ORDER — FENTANYL CITRATE 0.05 MG/ML
50 INJECTION, SOLUTION INTRAMUSCULAR; INTRAVENOUS ONCE
Status: COMPLETED | OUTPATIENT
Start: 2022-09-06 | End: 2022-09-06

## 2022-09-06 RX ORDER — DIPHENHYDRAMINE HYDROCHLORIDE 50 MG/ML
25 INJECTION INTRAMUSCULAR; INTRAVENOUS ONCE
Status: COMPLETED | OUTPATIENT
Start: 2022-09-06 | End: 2022-09-06

## 2022-09-06 RX ORDER — PROCHLORPERAZINE EDISYLATE 5 MG/ML
10 INJECTION INTRAMUSCULAR; INTRAVENOUS ONCE
Status: COMPLETED | OUTPATIENT
Start: 2022-09-06 | End: 2022-09-06

## 2022-09-06 RX ORDER — PROCHLORPERAZINE MALEATE 10 MG
10 TABLET ORAL EVERY 6 HOURS PRN
Qty: 10 TABLET | Refills: 0 | Status: ON HOLD | OUTPATIENT
Start: 2022-09-06 | End: 2022-09-18 | Stop reason: HOSPADM

## 2022-09-06 RX ORDER — 0.9 % SODIUM CHLORIDE 0.9 %
1000 INTRAVENOUS SOLUTION INTRAVENOUS ONCE
Status: COMPLETED | OUTPATIENT
Start: 2022-09-06 | End: 2022-09-06

## 2022-09-06 RX ADMIN — PROCHLORPERAZINE EDISYLATE 10 MG: 5 INJECTION INTRAMUSCULAR; INTRAVENOUS at 19:40

## 2022-09-06 RX ADMIN — DIPHENHYDRAMINE HYDROCHLORIDE 25 MG: 50 INJECTION, SOLUTION INTRAMUSCULAR; INTRAVENOUS at 19:39

## 2022-09-06 RX ADMIN — FENTANYL CITRATE 50 MCG: 50 INJECTION INTRAMUSCULAR; INTRAVENOUS at 19:40

## 2022-09-06 RX ADMIN — SODIUM CHLORIDE 1000 ML: 9 INJECTION, SOLUTION INTRAVENOUS at 19:39

## 2022-09-06 ASSESSMENT — ENCOUNTER SYMPTOMS
COUGH: 0
WHEEZING: 0
VOMITING: 1
SHORTNESS OF BREATH: 0
BACK PAIN: 0
ABDOMINAL PAIN: 1
NAUSEA: 1
SORE THROAT: 0
CHEST TIGHTNESS: 0
DIARRHEA: 0

## 2022-09-06 ASSESSMENT — PAIN DESCRIPTION - ORIENTATION
ORIENTATION: MID;UPPER
ORIENTATION: LOWER
ORIENTATION: LOWER

## 2022-09-06 ASSESSMENT — PAIN DESCRIPTION - LOCATION
LOCATION: ABDOMEN

## 2022-09-06 ASSESSMENT — PAIN - FUNCTIONAL ASSESSMENT
PAIN_FUNCTIONAL_ASSESSMENT: 0-10
PAIN_FUNCTIONAL_ASSESSMENT: 0-10

## 2022-09-06 ASSESSMENT — PAIN SCALES - GENERAL
PAINLEVEL_OUTOF10: 10
PAINLEVEL_OUTOF10: 10
PAINLEVEL_OUTOF10: 8

## 2022-09-06 NOTE — ED PROVIDER NOTES
Chief complaint:  Abdominal pain    HPI history provided by the patient  Patient was in complaining of mid abdominal pain with nausea and vomiting for the last several days, seen here for this the other day and was worked up and nothing was negative. States that he still having symptoms. Having some mid back pain with it. No chest pain, palpitations or shortness of breath. No flank pain or dysuria. No rashes. Eating and drinking make it worse. Review of Systems   Constitutional:  Positive for diaphoresis. Negative for chills, fatigue and fever. HENT:  Negative for congestion and sore throat. Respiratory:  Negative for cough, chest tightness, shortness of breath and wheezing. Cardiovascular:  Negative for chest pain, palpitations and leg swelling. Gastrointestinal:  Positive for abdominal pain, nausea and vomiting. Negative for diarrhea. Genitourinary:  Negative for dysuria, flank pain, frequency and urgency. Musculoskeletal:  Negative for arthralgias, back pain, gait problem, joint swelling, myalgias, neck pain and neck stiffness. Skin:  Negative for rash and wound. Neurological:  Negative for dizziness, seizures, syncope, weakness, light-headedness, numbness and headaches. All other systems reviewed and are negative. Physical Exam  Vitals and nursing note reviewed. Constitutional:       General: He is awake. He is not in acute distress. Appearance: He is well-developed. He is diaphoretic. He is not ill-appearing or toxic-appearing. HENT:      Head: Normocephalic and atraumatic. Eyes:      General: No scleral icterus. Pupils: Pupils are equal, round, and reactive to light. Neck:      Trachea: Trachea and phonation normal.   Cardiovascular:      Rate and Rhythm: Normal rate and regular rhythm. Heart sounds: Normal heart sounds. No murmur heard. Pulmonary:      Effort: Pulmonary effort is normal. No respiratory distress. Breath sounds: Normal breath sounds. No stridor, decreased air movement or transmitted upper airway sounds. No decreased breath sounds, wheezing, rhonchi or rales. Abdominal:      General: Bowel sounds are normal. There is no distension. Palpations: Abdomen is soft. Tenderness: There is generalized abdominal tenderness and tenderness in the periumbilical area. There is no right CVA tenderness, left CVA tenderness, guarding or rebound. Comments: Abdomen soft with mild to moderate general mid abdominal tenderness with no guarding, rebound tenderness or rigidity. No specific right upper or right lower quadrant pain. No CVA tenderness. Musculoskeletal:         General: No swelling, tenderness, deformity or signs of injury. Cervical back: Full passive range of motion without pain, normal range of motion and neck supple. No spinous process tenderness or muscular tenderness. Right lower leg: No edema. Left lower leg: No edema. Comments: Arms and legs are neurovascular intact with no pretibial edema or calf pain. Skin:     General: Skin is warm. Coloration: Skin is not cyanotic, jaundiced, mottled or pale. Findings: No bruising, erythema or rash. Neurological:      General: No focal deficit present. Mental Status: He is alert and oriented to person, place, and time. GCS: GCS eye subscore is 4. GCS verbal subscore is 5. GCS motor subscore is 6. Cranial Nerves: Cranial nerves are intact. No cranial nerve deficit. Sensory: Sensation is intact. Motor: Motor function is intact. Coordination: Coordination is intact. Coordination normal.   Psychiatric:         Behavior: Behavior is cooperative. Procedures     [facility]       ED Course as of 09/06/22 2144   Tu Sep 06, 2022   2141 Patient states he is feeling fine right now, all symptoms resolved, he states this is the first medicine that has worked as well for him.   Work-up results are discussed with him as well as the likelihood that this may be related to his marijuana use. He understands this and states he will stop. He will follow closely on an outpatient basis with his doctor. [NC]      ED Course User Index  [NC] Samira Stephens DO        EKG Interpretation    Interpreted by emergency department physician    Rhythm: sinus bradycardia and sinus arrhythmia  Rate: 58  Axis: normal  Ectopy: none  Conduction: normal  ST Segments: no acute change  T Waves: no acute change  Q Waves: none    Clinical Impression: sinus bradycardia    Samira Stephens DO    ED Course as of 09/06/22 2144   Tue Sep 06, 2022   2141 Patient states he is feeling fine right now, all symptoms resolved, he states this is the first medicine that has worked as well for him. Work-up results are discussed with him as well as the likelihood that this may be related to his marijuana use. He understands this and states he will stop. He will follow closely on an outpatient basis with his doctor. [NC]      ED Course User Index  [NC] Stuart Benjamin DO       --------------------------------------------- PAST HISTORY ---------------------------------------------  Past Medical History:  has a past medical history of Allergic rhinitis, Chronic back pain, Depression, and Hypertension. Past Surgical History:  has a past surgical history that includes Abscess Drainage. Social History:  reports that he has been smoking cigarettes. He started smoking about 25 years ago. He has a 7.50 pack-year smoking history. He has never used smokeless tobacco. He reports current alcohol use. He reports that he does not use drugs. Family History: family history is not on file. The patients home medications have been reviewed. Allergies: Patient has no known allergies.     -------------------------------------------------- RESULTS -------------------------------------------------  Labs:  Results for orders placed or performed during the hospital encounter of 09/06/22 CBC with Auto Differential   Result Value Ref Range    WBC 10.0 4.5 - 11.5 E9/L    RBC 6.01 (H) 3.80 - 5.80 E12/L    Hemoglobin 16.8 (H) 12.5 - 16.5 g/dL    Hematocrit 50.1 37.0 - 54.0 %    MCV 83.4 80.0 - 99.9 fL    MCH 28.0 26.0 - 35.0 pg    MCHC 33.5 32.0 - 34.5 %    RDW 12.4 11.5 - 15.0 fL    Platelets 054 867 - 309 E9/L    MPV 8.7 7.0 - 12.0 fL    Neutrophils % 58.2 43.0 - 80.0 %    Immature Granulocytes % 0.5 0.0 - 5.0 %    Lymphocytes % 32.3 20.0 - 42.0 %    Monocytes % 6.9 2.0 - 12.0 %    Eosinophils % 1.8 0.0 - 6.0 %    Basophils % 0.3 0.0 - 2.0 %    Neutrophils Absolute 5.84 1.80 - 7.30 E9/L    Immature Granulocytes # 0.05 E9/L    Lymphocytes Absolute 3.24 1.50 - 4.00 E9/L    Monocytes Absolute 0.69 0.10 - 0.95 E9/L    Eosinophils Absolute 0.18 0.05 - 0.50 E9/L    Basophils Absolute 0.03 0.00 - 0.20 P8/J   Basic Metabolic Panel   Result Value Ref Range    Sodium 138 132 - 146 mmol/L    Potassium 4.4 3.5 - 5.0 mmol/L    Chloride 99 98 - 107 mmol/L    CO2 26 22 - 29 mmol/L    Anion Gap 13 7 - 16 mmol/L    Glucose 173 (H) 74 - 99 mg/dL    BUN 12 6 - 20 mg/dL    Creatinine 1.0 0.7 - 1.2 mg/dL    GFR Non-African American >60 >=60 mL/min/1.73    GFR African American >60     Calcium 10.3 (H) 8.6 - 10.2 mg/dL   Lactic Acid   Result Value Ref Range    Lactic Acid 1.0 0.5 - 2.2 mmol/L   Hepatic Function Panel   Result Value Ref Range    Total Protein 8.4 (H) 6.4 - 8.3 g/dL    Albumin 4.9 3.5 - 5.2 g/dL    Alkaline Phosphatase 112 40 - 129 U/L     (H) 0 - 40 U/L    AST 54 (H) 0 - 39 U/L    Total Bilirubin 0.3 0.0 - 1.2 mg/dL    Bilirubin, Direct <0.2 0.0 - 0.3 mg/dL    Bilirubin, Indirect see below 0.0 - 1.0 mg/dL   Lipase   Result Value Ref Range    Lipase 80 (H) 13 - 60 U/L   Urine Drug Screen   Result Value Ref Range    Amphetamine Screen, Urine NOT DETECTED Negative <1000 ng/mL    Barbiturate Screen, Ur NOT DETECTED Negative < 200 ng/mL    Benzodiazepine Screen, Urine NOT DETECTED Negative < 200 ng/mL Cannabinoid Scrn, Ur POSITIVE (A) Negative < 50ng/mL    Cocaine Metabolite Screen, Urine NOT DETECTED Negative < 300 ng/mL    Opiate Scrn, Ur NOT DETECTED Negative < 300ng/mL    PCP Screen, Urine NOT DETECTED Negative < 25 ng/mL    Methadone Screen, Urine NOT DETECTED Negative <300 ng/mL    Oxycodone Urine POSITIVE (A) Negative <100 ng/mL    FENTANYL SCREEN, URINE NOT DETECTED Negative <1 ng/mL    Drug Screen Comment: see below    Urinalysis   Result Value Ref Range    Color, UA Yellow Straw/Yellow    Clarity, UA Clear Clear    Glucose, Ur Negative Negative mg/dL    Bilirubin Urine Negative Negative    Ketones, Urine Negative Negative mg/dL    Specific Gravity, UA 1.020 1.005 - 1.030    Blood, Urine Negative Negative    pH, UA 8.0 5.0 - 9.0    Protein, UA TRACE Negative mg/dL    Urobilinogen, Urine 0.2 <2.0 E.U./dL    Nitrite, Urine Negative Negative    Leukocyte Esterase, Urine Negative Negative   Troponin   Result Value Ref Range    Troponin, High Sensitivity <6 0 - 11 ng/L   Magnesium   Result Value Ref Range    Magnesium 2.2 1.6 - 2.6 mg/dL   Microscopic Urinalysis   Result Value Ref Range    WBC, UA 0-1 0 - 5 /HPF    RBC, UA NONE 0 - 2 /HPF    Bacteria, UA NONE SEEN None Seen /HPF    Amorphous, UA MANY        Radiology:  CT ABDOMEN PELVIS WO CONTRAST Additional Contrast? Oral   Final Result   1. No bowel obstruction, free air, or focal inflammatory changes. 2. Redemonstration of fatty infiltration involving wall of right colon and   cecum which could indicate chronic inflammatory bowel disease. 3. Redemonstration of multiple prominent lymph nodes at level of asiya   hepatis.             ------------------------- NURSING NOTES AND VITALS REVIEWED ---------------------------  Date / Time Roomed:  9/6/2022  7:15 PM  ED Bed Assignment:  06/06    The nursing notes within the ED encounter and vital signs as below have been reviewed.    /77   Pulse 63   Temp 97.9 °F (36.6 °C) (Oral)   Resp 18   Wt (!) 340 lb (154.2 kg)   SpO2 96%   BMI 47.42 kg/m²   Oxygen Saturation Interpretation: Normal      ------------------------------------------ PROGRESS NOTES ------------------------------------------  I have spoken with the patient and discussed todays results, in addition to providing specific details for the plan of care and counseling regarding the diagnosis and prognosis. Their questions are answered at this time and they are agreeable with the plan. I discussed at length with them reasons for immediate return here for re evaluation. They will followup with primary care by calling their office tomorrow. --------------------------------- ADDITIONAL PROVIDER NOTES ---------------------------------  At this time the patient is without objective evidence of an acute process requiring hospitalization or inpatient management. They have remained hemodynamically stable throughout their entire ED visit and are stable for discharge with outpatient follow-up. The plan has been discussed in detail and they are aware of the specific conditions for emergent return, as well as the importance of follow-up. New Prescriptions    PROCHLORPERAZINE (COMPAZINE) 10 MG TABLET    Take 1 tablet by mouth every 6 hours as needed (nausea)       Diagnosis:  1. Cannabinoid hyperemesis syndrome        Disposition:  Patient's disposition: Discharge to home  Patient's condition is stable.          Dearthomas Friedman DO  09/06/22 5533

## 2022-09-07 LAB
EKG ATRIAL RATE: 58 BPM
EKG P AXIS: 37 DEGREES
EKG P-R INTERVAL: 154 MS
EKG Q-T INTERVAL: 418 MS
EKG QRS DURATION: 82 MS
EKG QTC CALCULATION (BAZETT): 410 MS
EKG R AXIS: 73 DEGREES
EKG T AXIS: 51 DEGREES
EKG VENTRICULAR RATE: 58 BPM

## 2022-09-07 NOTE — ED NOTES
Oral contrast completed at this time.      General Call, [facility]  09/06/22 [id number]

## 2022-09-12 ENCOUNTER — HOSPITAL ENCOUNTER (EMERGENCY)
Age: 37
Discharge: HOME | DRG: 048 | End: 2022-09-12
Attending: EMERGENCY MEDICINE
Payer: MEDICAID

## 2022-09-12 VITALS
TEMPERATURE: 98.4 F | SYSTOLIC BLOOD PRESSURE: 168 MMHG | DIASTOLIC BLOOD PRESSURE: 74 MMHG | HEART RATE: 59 BPM | OXYGEN SATURATION: 99 % | RESPIRATION RATE: 16 BRPM

## 2022-09-12 DIAGNOSIS — F12.90 CANNABINOID HYPEREMESIS SYNDROME: Primary | ICD-10-CM

## 2022-09-12 DIAGNOSIS — R11.2 CANNABINOID HYPEREMESIS SYNDROME: Primary | ICD-10-CM

## 2022-09-12 LAB
ALBUMIN SERPL-MCNC: 4.5 G/DL (ref 3.5–5.2)
ALP BLD-CCNC: 89 U/L (ref 40–129)
ALT SERPL-CCNC: 90 U/L (ref 0–40)
ANION GAP SERPL CALCULATED.3IONS-SCNC: 14 MMOL/L (ref 7–16)
AST SERPL-CCNC: 45 U/L (ref 0–39)
BASOPHILS ABSOLUTE: 0.03 E9/L (ref 0–0.2)
BASOPHILS RELATIVE PERCENT: 0.3 % (ref 0–2)
BILIRUB SERPL-MCNC: 0.4 MG/DL (ref 0–1.2)
BUN BLDV-MCNC: 10 MG/DL (ref 6–20)
CALCIUM SERPL-MCNC: 9.5 MG/DL (ref 8.6–10.2)
CHLORIDE BLD-SCNC: 104 MMOL/L (ref 98–107)
CO2: 21 MMOL/L (ref 22–29)
CREAT SERPL-MCNC: 0.9 MG/DL (ref 0.7–1.2)
EOSINOPHILS ABSOLUTE: 0.02 E9/L (ref 0.05–0.5)
EOSINOPHILS RELATIVE PERCENT: 0.2 % (ref 0–6)
GFR AFRICAN AMERICAN: >60
GFR NON-AFRICAN AMERICAN: >60 ML/MIN/1.73
GLUCOSE BLD-MCNC: 157 MG/DL (ref 74–99)
HCT VFR BLD CALC: 48.6 % (ref 37–54)
HEMOGLOBIN: 16.2 G/DL (ref 12.5–16.5)
IMMATURE GRANULOCYTES #: 0.1 E9/L
IMMATURE GRANULOCYTES %: 1 % (ref 0–5)
LIPASE: 16 U/L (ref 13–60)
LYMPHOCYTES ABSOLUTE: 2.49 E9/L (ref 1.5–4)
LYMPHOCYTES RELATIVE PERCENT: 26.1 % (ref 20–42)
MCH RBC QN AUTO: 27.6 PG (ref 26–35)
MCHC RBC AUTO-ENTMCNC: 33.3 % (ref 32–34.5)
MCV RBC AUTO: 82.8 FL (ref 80–99.9)
MONOCYTES ABSOLUTE: 0.61 E9/L (ref 0.1–0.95)
MONOCYTES RELATIVE PERCENT: 6.4 % (ref 2–12)
NEUTROPHILS ABSOLUTE: 6.29 E9/L (ref 1.8–7.3)
NEUTROPHILS RELATIVE PERCENT: 66 % (ref 43–80)
PDW BLD-RTO: 12.7 FL (ref 11.5–15)
PLATELET # BLD: 328 E9/L (ref 130–450)
PMV BLD AUTO: 8.8 FL (ref 7–12)
POTASSIUM SERPL-SCNC: 4.3 MMOL/L (ref 3.5–5)
RBC # BLD: 5.87 E12/L (ref 3.8–5.8)
SODIUM BLD-SCNC: 139 MMOL/L (ref 132–146)
TOTAL PROTEIN: 7.8 G/DL (ref 6.4–8.3)
WBC # BLD: 9.5 E9/L (ref 4.5–11.5)

## 2022-09-12 PROCEDURE — 93005 ELECTROCARDIOGRAM TRACING: CPT | Performed by: EMERGENCY MEDICINE

## 2022-09-12 PROCEDURE — 36415 COLL VENOUS BLD VENIPUNCTURE: CPT

## 2022-09-12 PROCEDURE — 96375 TX/PRO/DX INJ NEW DRUG ADDON: CPT

## 2022-09-12 PROCEDURE — 96372 THER/PROPH/DIAG INJ SC/IM: CPT

## 2022-09-12 PROCEDURE — 6370000000 HC RX 637 (ALT 250 FOR IP): Performed by: EMERGENCY MEDICINE

## 2022-09-12 PROCEDURE — 83690 ASSAY OF LIPASE: CPT

## 2022-09-12 PROCEDURE — 96374 THER/PROPH/DIAG INJ IV PUSH: CPT

## 2022-09-12 PROCEDURE — 80053 COMPREHEN METABOLIC PANEL: CPT

## 2022-09-12 PROCEDURE — 99284 EMERGENCY DEPT VISIT MOD MDM: CPT

## 2022-09-12 PROCEDURE — 2580000003 HC RX 258: Performed by: EMERGENCY MEDICINE

## 2022-09-12 PROCEDURE — 6360000002 HC RX W HCPCS: Performed by: EMERGENCY MEDICINE

## 2022-09-12 PROCEDURE — 85025 COMPLETE CBC W/AUTO DIFF WBC: CPT

## 2022-09-12 RX ORDER — ONDANSETRON 2 MG/ML
4 INJECTION INTRAMUSCULAR; INTRAVENOUS ONCE
Status: COMPLETED | OUTPATIENT
Start: 2022-09-12 | End: 2022-09-12

## 2022-09-12 RX ORDER — CAPSAICIN 0.07 G/100G
CREAM TOPICAL
Qty: 120 G | Refills: 1 | Status: SHIPPED | OUTPATIENT
Start: 2022-09-12 | End: 2022-10-12

## 2022-09-12 RX ORDER — 0.9 % SODIUM CHLORIDE 0.9 %
1000 INTRAVENOUS SOLUTION INTRAVENOUS ONCE
Status: COMPLETED | OUTPATIENT
Start: 2022-09-12 | End: 2022-09-12

## 2022-09-12 RX ORDER — CAPSAICIN 0.025 %
CREAM (GRAM) TOPICAL 2 TIMES DAILY
Status: DISCONTINUED | OUTPATIENT
Start: 2022-09-12 | End: 2022-09-13 | Stop reason: HOSPADM

## 2022-09-12 RX ORDER — METOCLOPRAMIDE HYDROCHLORIDE 5 MG/ML
10 INJECTION INTRAMUSCULAR; INTRAVENOUS ONCE
Status: COMPLETED | OUTPATIENT
Start: 2022-09-12 | End: 2022-09-12

## 2022-09-12 RX ORDER — ONDANSETRON 4 MG/1
4 TABLET, ORALLY DISINTEGRATING ORAL EVERY 8 HOURS PRN
Qty: 10 TABLET | Refills: 0 | Status: SHIPPED | OUTPATIENT
Start: 2022-09-12 | End: 2022-10-24

## 2022-09-12 RX ORDER — DROPERIDOL 2.5 MG/ML
2.5 INJECTION, SOLUTION INTRAMUSCULAR; INTRAVENOUS ONCE
Status: COMPLETED | OUTPATIENT
Start: 2022-09-12 | End: 2022-09-12

## 2022-09-12 RX ORDER — PROMETHAZINE HYDROCHLORIDE 25 MG/ML
25 INJECTION, SOLUTION INTRAMUSCULAR; INTRAVENOUS ONCE
Status: COMPLETED | OUTPATIENT
Start: 2022-09-12 | End: 2022-09-12

## 2022-09-12 RX ADMIN — CAPSAICIN: 0.25 CREAM TOPICAL at 19:29

## 2022-09-12 RX ADMIN — METOCLOPRAMIDE 10 MG: 5 INJECTION, SOLUTION INTRAMUSCULAR; INTRAVENOUS at 19:29

## 2022-09-12 RX ADMIN — SODIUM CHLORIDE 1000 ML: 9 INJECTION, SOLUTION INTRAVENOUS at 19:28

## 2022-09-12 RX ADMIN — DROPERIDOL 2.5 MG: 2.5 INJECTION, SOLUTION INTRAMUSCULAR; INTRAVENOUS at 20:36

## 2022-09-12 RX ADMIN — SODIUM CHLORIDE 1000 ML: 9 INJECTION, SOLUTION INTRAVENOUS at 20:37

## 2022-09-12 RX ADMIN — PROMETHAZINE HYDROCHLORIDE 25 MG: 25 INJECTION INTRAMUSCULAR; INTRAVENOUS at 19:29

## 2022-09-12 RX ADMIN — ONDANSETRON 4 MG: 2 INJECTION INTRAMUSCULAR; INTRAVENOUS at 19:29

## 2022-09-12 ASSESSMENT — ENCOUNTER SYMPTOMS
ABDOMINAL PAIN: 1
CHEST TIGHTNESS: 0
SHORTNESS OF BREATH: 0
NAUSEA: 1
VOMITING: 1

## 2022-09-12 NOTE — ED PROVIDER NOTES
39 yo male presenting from home with generalized abdominal pain and emesis. He says he gets this way every morning, he says he stopped smoking pot recently. He is seeing been here several times for the same symptoms. This is a recurrent problem, intermittent, moderate severity, associate with his nausea and dry heaving. No family history on file. Past Surgical History:   Procedure Laterality Date    ABSCESS DRAINAGE         Review of Systems   Constitutional:  Negative for chills and fever. Respiratory:  Negative for chest tightness and shortness of breath. Cardiovascular:  Negative for chest pain. Gastrointestinal:  Positive for abdominal pain, nausea and vomiting. All other systems reviewed and are negative. Physical Exam  Constitutional:       General: He is not in acute distress. Appearance: He is well-developed. Comments: Appears uncomfortable   HENT:      Head: Normocephalic and atraumatic. Eyes:      Pupils: Pupils are equal, round, and reactive to light. Neck:      Thyroid: No thyromegaly. Cardiovascular:      Rate and Rhythm: Normal rate and regular rhythm. Pulmonary:      Effort: Pulmonary effort is normal. No respiratory distress. Breath sounds: Normal breath sounds. No wheezing. Abdominal:      General: There is no distension. Palpations: Abdomen is soft. There is no mass. Tenderness: There is no abdominal tenderness. There is no guarding or rebound. Comments: No peritoneal signs, no rigidity, no guarding, no specific tenderness anywhere   Musculoskeletal:         General: No tenderness. Normal range of motion. Cervical back: Normal range of motion and neck supple. Skin:     General: Skin is warm and dry. Findings: No erythema. Neurological:      Mental Status: He is alert and oriented to person, place, and time. Cranial Nerves: No cranial nerve deficit.    Psychiatric:         Mood and Affect: Mood normal.        Procedures ------------------------------------------  I have spoken with the patient and discussed todays results, in addition to providing specific details for the plan of care and counseling regarding the diagnosis and prognosis. Their questions are answered at this time and they are agreeable with the plan. I discussed at length with them reasons for immediate return here for re evaluation. They will followup with primary care by calling their office tomorrow. --------------------------------- ADDITIONAL PROVIDER NOTES ---------------------------------  At this time the patient is without objective evidence of an acute process requiring hospitalization or inpatient management. They have remained hemodynamically stable throughout their entire ED visit and are stable for discharge with outpatient follow-up. The plan has been discussed in detail and they are aware of the specific conditions for emergent return, as well as the importance of follow-up. Discharge Medication List as of 9/12/2022 10:10 PM        START taking these medications    Details   capsicum (ZOSTRIX) 0.075 % topical cream Apply topically 3 times daily. , Disp-120 g, R-1, Normal      ondansetron (ZOFRAN ODT) 4 MG disintegrating tablet Take 1 tablet by mouth every 8 hours as needed for Nausea, Disp-10 tablet, R-0Normal             Diagnosis:  1. Cannabinoid hyperemesis syndrome        Disposition:  Patient's disposition: Discharge to home  Patient's condition is stable.            Anson Fuentes DO  09/13/22 0110

## 2022-09-13 LAB
EKG ATRIAL RATE: 54 BPM
EKG P AXIS: 34 DEGREES
EKG P-R INTERVAL: 146 MS
EKG Q-T INTERVAL: 440 MS
EKG QRS DURATION: 90 MS
EKG QTC CALCULATION (BAZETT): 417 MS
EKG R AXIS: 74 DEGREES
EKG T AXIS: 46 DEGREES
EKG VENTRICULAR RATE: 54 BPM

## 2022-09-13 NOTE — ED NOTES
Pt verbalizes understanding of discharge instructions, medications and follow up. Pt ambulatory out of ED, vss, A&O X3 with girlfriend.       Vin Gooden RN  09/12/22 8327

## 2022-09-14 ENCOUNTER — HOSPITAL ENCOUNTER (INPATIENT)
Age: 37
LOS: 4 days | Discharge: HOME | DRG: 048 | End: 2022-09-18
Attending: EMERGENCY MEDICINE | Admitting: INTERNAL MEDICINE
Payer: MEDICAID

## 2022-09-14 DIAGNOSIS — R11.2 INTRACTABLE NAUSEA AND VOMITING: Primary | ICD-10-CM

## 2022-09-14 DIAGNOSIS — F12.10 MARIJUANA ABUSE: ICD-10-CM

## 2022-09-14 PROBLEM — R11.15 CYCLICAL VOMITING: Status: ACTIVE | Noted: 2022-09-14

## 2022-09-14 LAB
ALBUMIN SERPL-MCNC: 4.9 G/DL (ref 3.5–5.2)
ALP BLD-CCNC: 95 U/L (ref 40–129)
ALT SERPL-CCNC: 96 U/L (ref 0–40)
ANION GAP SERPL CALCULATED.3IONS-SCNC: 13 MMOL/L (ref 7–16)
AST SERPL-CCNC: 46 U/L (ref 0–39)
BASOPHILS ABSOLUTE: 0.04 E9/L (ref 0–0.2)
BASOPHILS RELATIVE PERCENT: 0.3 % (ref 0–2)
BETA-HYDROXYBUTYRATE: 0.39 MMOL/L (ref 0.02–0.27)
BILIRUB SERPL-MCNC: 0.7 MG/DL (ref 0–1.2)
BILIRUBIN DIRECT: <0.2 MG/DL (ref 0–0.3)
BILIRUBIN, INDIRECT: ABNORMAL MG/DL (ref 0–1)
BUN BLDV-MCNC: 12 MG/DL (ref 6–20)
CALCIUM SERPL-MCNC: 10.1 MG/DL (ref 8.6–10.2)
CHLORIDE BLD-SCNC: 101 MMOL/L (ref 98–107)
CHP ED QC CHECK: NORMAL
CHP ED QC CHECK: NORMAL
CO2: 24 MMOL/L (ref 22–29)
CREAT SERPL-MCNC: 0.9 MG/DL (ref 0.7–1.2)
EOSINOPHILS ABSOLUTE: 0.12 E9/L (ref 0.05–0.5)
EOSINOPHILS RELATIVE PERCENT: 1 % (ref 0–6)
GFR AFRICAN AMERICAN: >60
GFR NON-AFRICAN AMERICAN: >60 ML/MIN/1.73
GLUCOSE BLD-MCNC: 161 MG/DL
GLUCOSE BLD-MCNC: 168 MG/DL (ref 74–99)
GLUCOSE BLD-MCNC: 93 MG/DL
HCT VFR BLD CALC: 49.5 % (ref 37–54)
HEMOGLOBIN: 16.4 G/DL (ref 12.5–16.5)
IMMATURE GRANULOCYTES #: 0.07 E9/L
IMMATURE GRANULOCYTES %: 0.6 % (ref 0–5)
LIPASE: 27 U/L (ref 13–60)
LYMPHOCYTES ABSOLUTE: 3.08 E9/L (ref 1.5–4)
LYMPHOCYTES RELATIVE PERCENT: 25 % (ref 20–42)
MCH RBC QN AUTO: 27.6 PG (ref 26–35)
MCHC RBC AUTO-ENTMCNC: 33.1 % (ref 32–34.5)
MCV RBC AUTO: 83.3 FL (ref 80–99.9)
METER GLUCOSE: 161 MG/DL (ref 74–99)
METER GLUCOSE: 93 MG/DL (ref 74–99)
MONOCYTES ABSOLUTE: 0.91 E9/L (ref 0.1–0.95)
MONOCYTES RELATIVE PERCENT: 7.4 % (ref 2–12)
NEUTROPHILS ABSOLUTE: 8.1 E9/L (ref 1.8–7.3)
NEUTROPHILS RELATIVE PERCENT: 65.7 % (ref 43–80)
PDW BLD-RTO: 12.8 FL (ref 11.5–15)
PH VENOUS: 7.41 (ref 7.35–7.45)
PLATELET # BLD: 332 E9/L (ref 130–450)
PMV BLD AUTO: 8.7 FL (ref 7–12)
POTASSIUM REFLEX MAGNESIUM: 4 MMOL/L (ref 3.5–5)
RBC # BLD: 5.94 E12/L (ref 3.8–5.8)
SODIUM BLD-SCNC: 138 MMOL/L (ref 132–146)
TOTAL PROTEIN: 8.1 G/DL (ref 6.4–8.3)
TROPONIN, HIGH SENSITIVITY: 9 NG/L (ref 0–11)
WBC # BLD: 12.3 E9/L (ref 4.5–11.5)

## 2022-09-14 PROCEDURE — 93005 ELECTROCARDIOGRAM TRACING: CPT | Performed by: STUDENT IN AN ORGANIZED HEALTH CARE EDUCATION/TRAINING PROGRAM

## 2022-09-14 PROCEDURE — 99285 EMERGENCY DEPT VISIT HI MDM: CPT

## 2022-09-14 PROCEDURE — 83690 ASSAY OF LIPASE: CPT

## 2022-09-14 PROCEDURE — 99222 1ST HOSP IP/OBS MODERATE 55: CPT | Performed by: INTERNAL MEDICINE

## 2022-09-14 PROCEDURE — 6360000002 HC RX W HCPCS: Performed by: STUDENT IN AN ORGANIZED HEALTH CARE EDUCATION/TRAINING PROGRAM

## 2022-09-14 PROCEDURE — 1200000000 HC SEMI PRIVATE

## 2022-09-14 PROCEDURE — 96376 TX/PRO/DX INJ SAME DRUG ADON: CPT

## 2022-09-14 PROCEDURE — 82800 BLOOD PH: CPT

## 2022-09-14 PROCEDURE — 82962 GLUCOSE BLOOD TEST: CPT

## 2022-09-14 PROCEDURE — 80048 BASIC METABOLIC PNL TOTAL CA: CPT

## 2022-09-14 PROCEDURE — 84484 ASSAY OF TROPONIN QUANT: CPT

## 2022-09-14 PROCEDURE — 80076 HEPATIC FUNCTION PANEL: CPT

## 2022-09-14 PROCEDURE — 96374 THER/PROPH/DIAG INJ IV PUSH: CPT

## 2022-09-14 PROCEDURE — 2580000003 HC RX 258: Performed by: INTERNAL MEDICINE

## 2022-09-14 PROCEDURE — 85025 COMPLETE CBC W/AUTO DIFF WBC: CPT

## 2022-09-14 PROCEDURE — 6360000002 HC RX W HCPCS: Performed by: INTERNAL MEDICINE

## 2022-09-14 PROCEDURE — 82010 KETONE BODYS QUAN: CPT

## 2022-09-14 PROCEDURE — 6370000000 HC RX 637 (ALT 250 FOR IP): Performed by: INTERNAL MEDICINE

## 2022-09-14 PROCEDURE — 96375 TX/PRO/DX INJ NEW DRUG ADDON: CPT

## 2022-09-14 RX ORDER — CLONAZEPAM 1 MG/1
0.5 TABLET ORAL EVERY 12 HOURS PRN
Status: DISCONTINUED | OUTPATIENT
Start: 2022-09-14 | End: 2022-09-17

## 2022-09-14 RX ORDER — ALBUTEROL SULFATE 90 UG/1
2 AEROSOL, METERED RESPIRATORY (INHALATION) 4 TIMES DAILY PRN
Status: DISCONTINUED | OUTPATIENT
Start: 2022-09-14 | End: 2022-09-14 | Stop reason: CLARIF

## 2022-09-14 RX ORDER — SODIUM CHLORIDE 0.9 % (FLUSH) 0.9 %
5-40 SYRINGE (ML) INJECTION PRN
Status: DISCONTINUED | OUTPATIENT
Start: 2022-09-14 | End: 2022-09-18 | Stop reason: HOSPADM

## 2022-09-14 RX ORDER — SODIUM CHLORIDE 9 MG/ML
INJECTION, SOLUTION INTRAVENOUS PRN
Status: DISCONTINUED | OUTPATIENT
Start: 2022-09-14 | End: 2022-09-18 | Stop reason: HOSPADM

## 2022-09-14 RX ORDER — DEXTROSE MONOHYDRATE 100 MG/ML
INJECTION, SOLUTION INTRAVENOUS CONTINUOUS PRN
Status: DISCONTINUED | OUTPATIENT
Start: 2022-09-14 | End: 2022-09-18 | Stop reason: HOSPADM

## 2022-09-14 RX ORDER — TRAZODONE HYDROCHLORIDE 150 MG/1
300 TABLET ORAL NIGHTLY
Status: DISCONTINUED | OUTPATIENT
Start: 2022-09-14 | End: 2022-09-18 | Stop reason: HOSPADM

## 2022-09-14 RX ORDER — POLYETHYLENE GLYCOL 3350 17 G/17G
17 POWDER, FOR SOLUTION ORAL DAILY PRN
Status: DISCONTINUED | OUTPATIENT
Start: 2022-09-14 | End: 2022-09-18 | Stop reason: HOSPADM

## 2022-09-14 RX ORDER — ALBUTEROL SULFATE 2.5 MG/3ML
2.5 SOLUTION RESPIRATORY (INHALATION) 4 TIMES DAILY PRN
Status: DISCONTINUED | OUTPATIENT
Start: 2022-09-14 | End: 2022-09-18 | Stop reason: HOSPADM

## 2022-09-14 RX ORDER — ACETAMINOPHEN 650 MG/1
650 SUPPOSITORY RECTAL EVERY 6 HOURS PRN
Status: DISCONTINUED | OUTPATIENT
Start: 2022-09-14 | End: 2022-09-18 | Stop reason: HOSPADM

## 2022-09-14 RX ORDER — ESCITALOPRAM OXALATE 20 MG/1
20 TABLET ORAL DAILY
Status: DISCONTINUED | OUTPATIENT
Start: 2022-09-15 | End: 2022-09-18 | Stop reason: HOSPADM

## 2022-09-14 RX ORDER — SODIUM CHLORIDE 9 MG/ML
INJECTION, SOLUTION INTRAVENOUS CONTINUOUS
Status: DISCONTINUED | OUTPATIENT
Start: 2022-09-14 | End: 2022-09-17 | Stop reason: SDUPTHER

## 2022-09-14 RX ORDER — DROPERIDOL 2.5 MG/ML
2.5 INJECTION, SOLUTION INTRAMUSCULAR; INTRAVENOUS EVERY 6 HOURS PRN
Status: DISCONTINUED | OUTPATIENT
Start: 2022-09-14 | End: 2022-09-15

## 2022-09-14 RX ORDER — ARIPIPRAZOLE 10 MG/1
10 TABLET ORAL NIGHTLY
Status: DISCONTINUED | OUTPATIENT
Start: 2022-09-14 | End: 2022-09-18 | Stop reason: HOSPADM

## 2022-09-14 RX ORDER — ACETAMINOPHEN 325 MG/1
650 TABLET ORAL EVERY 6 HOURS PRN
Status: DISCONTINUED | OUTPATIENT
Start: 2022-09-14 | End: 2022-09-18 | Stop reason: HOSPADM

## 2022-09-14 RX ORDER — SODIUM CHLORIDE 0.9 % (FLUSH) 0.9 %
5-40 SYRINGE (ML) INJECTION EVERY 12 HOURS SCHEDULED
Status: DISCONTINUED | OUTPATIENT
Start: 2022-09-14 | End: 2022-09-18 | Stop reason: HOSPADM

## 2022-09-14 RX ORDER — GABAPENTIN 100 MG/1
100 CAPSULE ORAL 3 TIMES DAILY
Status: DISCONTINUED | OUTPATIENT
Start: 2022-09-14 | End: 2022-09-18 | Stop reason: HOSPADM

## 2022-09-14 RX ORDER — ENOXAPARIN SODIUM 100 MG/ML
40 INJECTION SUBCUTANEOUS 2 TIMES DAILY
Status: DISCONTINUED | OUTPATIENT
Start: 2022-09-14 | End: 2022-09-18 | Stop reason: HOSPADM

## 2022-09-14 RX ORDER — ONDANSETRON 2 MG/ML
4 INJECTION INTRAMUSCULAR; INTRAVENOUS EVERY 4 HOURS PRN
Status: DISCONTINUED | OUTPATIENT
Start: 2022-09-14 | End: 2022-09-15

## 2022-09-14 RX ORDER — INSULIN GLARGINE 100 [IU]/ML
10 INJECTION, SOLUTION SUBCUTANEOUS NIGHTLY
Status: DISCONTINUED | OUTPATIENT
Start: 2022-09-14 | End: 2022-09-18 | Stop reason: HOSPADM

## 2022-09-14 RX ORDER — METOCLOPRAMIDE HYDROCHLORIDE 5 MG/ML
10 INJECTION INTRAMUSCULAR; INTRAVENOUS EVERY 6 HOURS PRN
Status: DISCONTINUED | OUTPATIENT
Start: 2022-09-14 | End: 2022-09-15

## 2022-09-14 RX ORDER — DIPHENHYDRAMINE HYDROCHLORIDE 50 MG/ML
25 INJECTION INTRAMUSCULAR; INTRAVENOUS ONCE
Status: COMPLETED | OUTPATIENT
Start: 2022-09-14 | End: 2022-09-14

## 2022-09-14 RX ADMIN — INSULIN GLARGINE 10 UNITS: 100 INJECTION, SOLUTION SUBCUTANEOUS at 22:17

## 2022-09-14 RX ADMIN — ENOXAPARIN SODIUM 40 MG: 100 INJECTION SUBCUTANEOUS at 22:16

## 2022-09-14 RX ADMIN — Medication 10 ML: at 22:19

## 2022-09-14 RX ADMIN — CLONAZEPAM 0.5 MG: 1 TABLET ORAL at 22:16

## 2022-09-14 RX ADMIN — GABAPENTIN 100 MG: 100 CAPSULE ORAL at 22:17

## 2022-09-14 RX ADMIN — TRAZODONE HYDROCHLORIDE 300 MG: 150 TABLET ORAL at 22:16

## 2022-09-14 RX ADMIN — ARIPIPRAZOLE 10 MG: 10 TABLET ORAL at 22:16

## 2022-09-14 RX ADMIN — DROPERIDOL 2.5 MG: 2.5 INJECTION, SOLUTION INTRAMUSCULAR; INTRAVENOUS at 13:58

## 2022-09-14 RX ADMIN — SODIUM CHLORIDE: 9 INJECTION, SOLUTION INTRAVENOUS at 22:20

## 2022-09-14 RX ADMIN — DIPHENHYDRAMINE HYDROCHLORIDE 25 MG: 50 INJECTION INTRAMUSCULAR; INTRAVENOUS at 17:04

## 2022-09-14 RX ADMIN — DIPHENHYDRAMINE HYDROCHLORIDE 25 MG: 50 INJECTION INTRAMUSCULAR; INTRAVENOUS at 13:57

## 2022-09-14 RX ADMIN — DROPERIDOL 2.5 MG: 2.5 INJECTION, SOLUTION INTRAMUSCULAR; INTRAVENOUS at 18:05

## 2022-09-14 ASSESSMENT — PAIN SCALES - GENERAL
PAINLEVEL_OUTOF10: 5
PAINLEVEL_OUTOF10: 7
PAINLEVEL_OUTOF10: 9

## 2022-09-14 ASSESSMENT — PAIN - FUNCTIONAL ASSESSMENT
PAIN_FUNCTIONAL_ASSESSMENT: 0-10

## 2022-09-14 ASSESSMENT — ENCOUNTER SYMPTOMS
NAUSEA: 1
VOMITING: 1
SINUS PRESSURE: 0
COUGH: 0
ABDOMINAL PAIN: 1
SORE THROAT: 0
WHEEZING: 0
DIARRHEA: 0
EYE PAIN: 0
EYE DISCHARGE: 0
BACK PAIN: 0
SHORTNESS OF BREATH: 0
EYE REDNESS: 0

## 2022-09-14 ASSESSMENT — PAIN DESCRIPTION - FREQUENCY: FREQUENCY: CONTINUOUS

## 2022-09-14 ASSESSMENT — PAIN DESCRIPTION - LOCATION: LOCATION: ABDOMEN

## 2022-09-14 ASSESSMENT — PAIN DESCRIPTION - PAIN TYPE: TYPE: ACUTE PAIN;CHRONIC PAIN

## 2022-09-14 NOTE — H&P
[address] [place], [place] [address]ist Group   History and Physical      CHIEF COMPLAINT:  Nausea and vomiting    History of Present Illness:  40 y.o. male with a history of DM,Cannabis abuse presents with Intractable Nausea and vomiting. History taken from the patient at the bedside. He is started having this symptom since morning which is continuously and progressively worsening. He he cannot hold any food. For that reason ER physician approached for admission. His vitals blood pressure 125/87, pulse 66, respiration 16. His blood chemistry shows normal except glucose 168. His liver enzymes shows ALT 96 AST 46. Toxicology screen shows cannabis  and oxycodone positive. Denies any abdominal pain, distention. Informant(s) for H&P:Patient and EMR    REVIEW OF SYSTEMS:  no fevers, chills, cp, sob, n/v, ha, vision/hearing changes, wt changes, hot/cold flashes, other open skin lesions, diarrhea, constipation, dysuria/hematuria unless noted in HPI. Complete ROS performed with the patient and is otherwise negative. PMH:  Past Medical History:   Diagnosis Date    Allergic rhinitis     Chronic back pain     Depression     Hypertension        Surgical History:  Past Surgical History:   Procedure Laterality Date    ABSCESS DRAINAGE         Medications Prior to Admission:    Prior to Admission medications    Medication Sig Start Date End Date Taking? Authorizing Provider   albuterol sulfate HFA (VENTOLIN HFA) 108 (90 Base) MCG/ACT inhaler Inhale 2 puffs into the lungs 4 times daily as needed for Wheezing 7/11/22   Liyalita Severe, MD   insulin glargine (LANTUS SOLOSTAR) 100 UNIT/ML injection pen Inject 10 Units into the skin nightly 7/7/22   Rosalita Severe, MD   capsicum (ZOSTRIX) 0.075 % topical cream Apply topically 3 times daily.  9/12/22 10/12/22  Zina David,    ondansetron (ZOFRAN ODT) 4 MG disintegrating tablet Take 1 tablet by mouth every 8 hours as needed for Nausea 9/12/22   Zina David DO prochlorperazine (COMPAZINE) 10 MG tablet Take 1 tablet by mouth every 6 hours as needed (nausea) 9/6/22   Brandy Benjamin DO   dicyclomine (BENTYL) 10 MG capsule Take 2 capsules by mouth 4 times daily (before meals and nightly) 9/4/22 9/4/23  LIZETH Pitt   pantoprazole (PROTONIX) 20 MG tablet Take 1 tablet by mouth daily 9/1/22   Bc Herrera MD   brompheniramine-pseudoephedrine-DM 2-30-10 MG/5ML syrup Take 5 mLs by mouth 4 times daily as needed for Cough or Congestion 8/27/22   Bc Herrera MD   naproxen (NAPROSYN) 500 MG tablet Take 1 tablet by mouth in the morning and 1 tablet before bedtime. Do all this for 10 days. 8/10/22 9/1/22  Joanna Mayfield MD   Lancets [facility] PLUS [id number]) MISC USE TO TEST FOUR TIMES DAILY AS DIRECTED 7/7/22   Pasquale Stanford MD   sharps container 1 each by Does not apply route as needed (needles) 4/11/22   Pasquale Stanford MD   Insulin Pen Needle (PEN NEEDLES) 32G X 5 MM MISC 1 each by Does not apply route daily 4/11/22   Pasquale Stanford MD   blood glucose monitor strips Test 4 times a day & as needed for symptoms of irregular blood glucose. Dispense sufficient amount for indicated testing frequency plus additional to accommodate PRN testing needs. E11.9 4/11/22   Pasquale Stanford MD   blood glucose monitor kit and supplies Dispense sufficient amount for indicated testing frequency plus additional to accommodate PRN testing needs. Dispense all needed supplies to include: monitor, strips, lancing device, lancets, control solutions, alcohol swabs.  E11.9 2/24/22   Pasquale Stanford MD   glimepiride (AMARYL) 2 MG tablet Take 1 tablet by mouth every morning (before breakfast)  Patient not taking: No sig reported 2/18/22   Kayleen Pope MD   traZODone (DESYREL) 150 MG tablet Take 2 tablets by mouth nightly 9/1/21   Historical Provider, MD   topiramate (TOPAMAX) 25 MG tablet TAKE 2 TABLETS BY MOUTH AT BEDTIME 8/31/21   Historical Provider, MD   gabapentin (NEURONTIN) 100 MG capsule Take 100 mg by mouth in the morning and 100 mg at noon and 100 mg before bedtime. 3 tablets q am. 8/31/21   Historical Provider, MD   ARIPiprazole (ABILIFY) 5 MG tablet Take 5 mg by mouth nightly 9/1/21   Historical Provider, MD   clonazePAM (KLONOPIN) 0.5 MG tablet TAKE 1 AND 1/2 TABLETS BY MOUTH DAILY AS NEEDED 2/2/21   Historical Provider, MD   escitalopram (LEXAPRO) 20 MG tablet TAKE 1 TABLET BY MOUTH DAILY 1/5/21   Historical Provider, MD   hydrOXYzine (VISTARIL) 50 MG capsule TAKE 1 CAPSULE BY MOUTH TWICE DAILY AS NEEDED 1/5/21   Historical Provider, MD       Allergies:    Patient has no known allergies. Social History:    reports that he has been smoking cigarettes. He started smoking about 25 years ago. He has a 7.50 pack-year smoking history. He has never used smokeless tobacco. He reports current alcohol use. He reports that he does not use drugs. Family History:   family history is not on file. Father Have HTN.      PHYSICAL EXAM:  Vitals:  /87   Pulse 66   Temp 97.1 °F (36.2 °C) (Infrared)   Resp 16   Wt (!) 340 lb (154.2 kg)   SpO2 97%   BMI 47.42 kg/m²     General Appearance: alert and oriented to person, place and time and in no acute distress  Skin: warm and dry  Head: normocephalic and atraumatic  Eyes: pupils equal, round, and reactive to light, extraocular eye movements intact, conjunctivae normal  Neck: neck supple and non tender without mass   Pulmonary/Chest: clear to auscultation bilaterally- no wheezes, rales or rhonchi, normal air movement, no respiratory distress  Cardiovascular: normal rate, normal S1 and S2 and no carotid bruits  Abdomen: soft, non-tender, non-distended, normal bowel sounds, no masses or organomegaly  Extremities: no cyanosis, no clubbing and no edema  Neurologic: no cranial nerve deficit and speech normal    LABS:  Recent Labs     09/12/22  1934 09/14/22  1311 09/14/22  1312     --  138   K 4.3  --  4.0

## 2022-09-14 NOTE — ED NOTES
Patient requesting fluids at this time Yudith notified and verbalized patient is allowed to have oral fluids. Patient given diet pepsi. Patient did not tolerate oral intake.  Requesting a sandwich states \"I think something solid will work better\"      [employer], RN  09/14/22 862 [facility]

## 2022-09-14 NOTE — ED PROVIDER NOTES
The history is provided by the patient and medical records. 20-year-old male presents emerged department complaint of nausea vomiting epigastric abdominal pain symptoms going on for the past couple days. Nothing makes his symptoms better or worse moderate in severity no other associated complaints. Patient does elicit smoking marijuana has quit for the past 2 weeks however was a daily smoker. Denies any other acute complaints at this time. Denies any chest pain or shortness of breath only medical history is diabetes is on insulin states that sugars been running from 100 200 at home. Otherwise no other acute complaints. Review of Systems   Constitutional:  Negative for chills and fever. HENT:  Negative for ear pain, sinus pressure and sore throat. Eyes:  Negative for pain, discharge and redness. Respiratory:  Negative for cough, shortness of breath and wheezing. Cardiovascular:  Negative for chest pain. Gastrointestinal:  Positive for abdominal pain, nausea and vomiting. Negative for diarrhea. Genitourinary:  Negative for dysuria and frequency. Musculoskeletal:  Negative for arthralgias and back pain. Skin:  Negative for rash and wound. Neurological:  Negative for weakness and headaches. Hematological:  Negative for adenopathy. All other systems reviewed and are negative. Physical Exam  Vitals and nursing note reviewed. Constitutional:       Appearance: He is well-developed. HENT:      Head: Normocephalic and atraumatic. Eyes:      Conjunctiva/sclera: Conjunctivae normal.   Cardiovascular:      Rate and Rhythm: Normal rate and regular rhythm. Heart sounds: Normal heart sounds. No murmur heard. Pulmonary:      Effort: Pulmonary effort is normal. No respiratory distress. Breath sounds: Normal breath sounds. No wheezing or rales. Abdominal:      General: Bowel sounds are normal.      Palpations: Abdomen is soft. Tenderness:  There is no abdominal tenderness. There is no guarding or rebound. Musculoskeletal:         General: No tenderness or deformity. Cervical back: Normal range of motion and neck supple. Skin:     General: Skin is warm and dry. Neurological:      Mental Status: He is alert and oriented to person, place, and time. Psychiatric:         Mood and Affect: Mood normal.         Thought Content: Thought content normal.        Procedures     MDM     Amount and/or Complexity of Data Reviewed  Clinical lab tests: reviewed       28-year-old male presents emergency department complaint nausea vomiting he does have significant history of marijuana smoking. He was given multiple doses of medication for his nausea vomiting here in the emergency department did have slight improvement however is unable to tolerate p.o. or oral food or fluids. His laboratory work-up was reassuring. Due to patient still unable to tolerate p.o. challenge and continued abdominal pain from his nausea and vomiting he will need to be admitted to the hospital for his intractable nausea vomiting most likely secondary to marijuana abuse. He was otherwise hemodynamically stable and agreeable this plan. Did speak with hospitalist who is agreeable. EKG Interpretation    Interpreted by emergency department physician    Rhythm: normal sinus   Rate: normal  Axis: normal  Ectopy: none  Conduction: normal  ST Segments: no acute change  T Waves: no acute change  Q Waves: none    Clinical Impression: no acute changes when compared to previous    Gianni Joel, DO         --------------------------------------------- PAST HISTORY ---------------------------------------------  Past Medical History:  has a past medical history of Allergic rhinitis, Chronic back pain, Depression, and Hypertension. Past Surgical History:  has a past surgical history that includes Abscess Drainage. Social History:  reports that he has been smoking cigarettes.  He started smoking about 25 years ago. He has a 7.50 pack-year smoking history. He has never used smokeless tobacco. He reports current alcohol use. He reports that he does not use drugs. Family History: family history is not on file. The patients home medications have been reviewed. Allergies: Patient has no known allergies.     -------------------------------------------------- RESULTS -------------------------------------------------    LABS:  Results for orders placed or performed during the hospital encounter of 09/14/22   Beta-Hydroxybutyrate   Result Value Ref Range    Beta-Hydroxybutyrate 0.39 (H) 0.02 - 0.27 mmol/L   PH, VENOUS   Result Value Ref Range    pH, Tom 7.41 7.35 - 7.45   CBC with Auto Differential   Result Value Ref Range    WBC 12.3 (H) 4.5 - 11.5 E9/L    RBC 5.94 (H) 3.80 - 5.80 E12/L    Hemoglobin 16.4 12.5 - 16.5 g/dL    Hematocrit 49.5 37.0 - 54.0 %    MCV 83.3 80.0 - 99.9 fL    MCH 27.6 26.0 - 35.0 pg    MCHC 33.1 32.0 - 34.5 %    RDW 12.8 11.5 - 15.0 fL    Platelets 538 996 - 599 E9/L    MPV 8.7 7.0 - 12.0 fL    Neutrophils % 65.7 43.0 - 80.0 %    Immature Granulocytes % 0.6 0.0 - 5.0 %    Lymphocytes % 25.0 20.0 - 42.0 %    Monocytes % 7.4 2.0 - 12.0 %    Eosinophils % 1.0 0.0 - 6.0 %    Basophils % 0.3 0.0 - 2.0 %    Neutrophils Absolute 8.10 (H) 1.80 - 7.30 E9/L    Immature Granulocytes # 0.07 E9/L    Lymphocytes Absolute 3.08 1.50 - 4.00 E9/L    Monocytes Absolute 0.91 0.10 - 0.95 E9/L    Eosinophils Absolute 0.12 0.05 - 0.50 E9/L    Basophils Absolute 0.04 0.00 - 0.20 I2/D   Basic Metabolic Panel w/ Reflex to MG   Result Value Ref Range    Sodium 138 132 - 146 mmol/L    Potassium reflex Magnesium 4.0 3.5 - 5.0 mmol/L    Chloride 101 98 - 107 mmol/L    CO2 24 22 - 29 mmol/L    Anion Gap 13 7 - 16 mmol/L    Glucose 168 (H) 74 - 99 mg/dL    BUN 12 6 - 20 mg/dL    Creatinine 0.9 0.7 - 1.2 mg/dL    GFR Non-African American >60 >=60 mL/min/1.73    GFR African American >60     Calcium 10.1 8.6 - 10.2 are agreeable with the plan of admission.    --------------------------------- ADDITIONAL PROVIDER NOTES ---------------------------------  Consultations:  . Spoke with hospitalist  .  Discussed case. They will admit the patient. This patient's ED course included: a personal history and physicial examination, re-evaluation prior to disposition, multiple bedside re-evaluations, IV medications, cardiac monitoring, and continuous pulse oximetry    This patient has remained hemodynamically stable during their ED course. Diagnosis:  1. Intractable nausea and vomiting    2. Marijuana abuse        Disposition:  Patient's disposition: Admit to telemetry  Patient's condition is stable.          Liz Gómez MD  Resident  09/14/22 [facility] Victor MDO claire  11/01/22 1936

## 2022-09-14 NOTE — ED NOTES
Patient requested something to eat. States he thinks solid food would be better than liquid. Patient educated that he may not be able to keep solids down given he cannot keep liquids down. Patient states he still would like to try crackers. Patient assessed by this RN 15 minutes later, patient states he had \"thrown up the crackers\".  Will continue to monitor      Ania Kuo RN  09/14/22 1452

## 2022-09-15 PROBLEM — E11.43 GASTROPARESIS DUE TO DM (HCC): Status: ACTIVE | Noted: 2022-09-15

## 2022-09-15 PROBLEM — K31.84 GASTROPARESIS DUE TO DM (HCC): Status: ACTIVE | Noted: 2022-09-15

## 2022-09-15 LAB
ALBUMIN SERPL-MCNC: 3.9 G/DL (ref 3.5–5.2)
ALP BLD-CCNC: 80 U/L (ref 40–129)
ALT SERPL-CCNC: 84 U/L (ref 0–40)
ANION GAP SERPL CALCULATED.3IONS-SCNC: 11 MMOL/L (ref 7–16)
AST SERPL-CCNC: 52 U/L (ref 0–39)
BASOPHILS ABSOLUTE: 0.04 E9/L (ref 0–0.2)
BASOPHILS RELATIVE PERCENT: 0.4 % (ref 0–2)
BILIRUB SERPL-MCNC: 0.7 MG/DL (ref 0–1.2)
BUN BLDV-MCNC: 11 MG/DL (ref 6–20)
CALCIUM SERPL-MCNC: 9.2 MG/DL (ref 8.6–10.2)
CHLORIDE BLD-SCNC: 102 MMOL/L (ref 98–107)
CO2: 23 MMOL/L (ref 22–29)
CREAT SERPL-MCNC: 1 MG/DL (ref 0.7–1.2)
EKG ATRIAL RATE: 81 BPM
EKG P AXIS: 35 DEGREES
EKG P-R INTERVAL: 124 MS
EKG Q-T INTERVAL: 382 MS
EKG QRS DURATION: 78 MS
EKG QTC CALCULATION (BAZETT): 443 MS
EKG R AXIS: 70 DEGREES
EKG T AXIS: 45 DEGREES
EKG VENTRICULAR RATE: 81 BPM
EOSINOPHILS ABSOLUTE: 0.18 E9/L (ref 0.05–0.5)
EOSINOPHILS RELATIVE PERCENT: 1.9 % (ref 0–6)
GFR AFRICAN AMERICAN: >60
GFR NON-AFRICAN AMERICAN: >60 ML/MIN/1.73
GLUCOSE BLD-MCNC: 96 MG/DL (ref 74–99)
HBA1C MFR BLD: 8.1 % (ref 4–5.6)
HCT VFR BLD CALC: 47.9 % (ref 37–54)
HEMOGLOBIN: 15.3 G/DL (ref 12.5–16.5)
IMMATURE GRANULOCYTES #: 0.05 E9/L
IMMATURE GRANULOCYTES %: 0.5 % (ref 0–5)
LYMPHOCYTES ABSOLUTE: 4.8 E9/L (ref 1.5–4)
LYMPHOCYTES RELATIVE PERCENT: 50.4 % (ref 20–42)
MCH RBC QN AUTO: 27.6 PG (ref 26–35)
MCHC RBC AUTO-ENTMCNC: 31.9 % (ref 32–34.5)
MCV RBC AUTO: 86.5 FL (ref 80–99.9)
METER GLUCOSE: 121 MG/DL (ref 74–99)
METER GLUCOSE: 142 MG/DL (ref 74–99)
METER GLUCOSE: 98 MG/DL (ref 74–99)
MONOCYTES ABSOLUTE: 0.81 E9/L (ref 0.1–0.95)
MONOCYTES RELATIVE PERCENT: 8.5 % (ref 2–12)
NEUTROPHILS ABSOLUTE: 3.64 E9/L (ref 1.8–7.3)
NEUTROPHILS RELATIVE PERCENT: 38.3 % (ref 43–80)
PDW BLD-RTO: 12.9 FL (ref 11.5–15)
PLATELET # BLD: 269 E9/L (ref 130–450)
PMV BLD AUTO: 9.1 FL (ref 7–12)
POTASSIUM REFLEX MAGNESIUM: 3.9 MMOL/L (ref 3.5–5)
RBC # BLD: 5.54 E12/L (ref 3.8–5.8)
SODIUM BLD-SCNC: 136 MMOL/L (ref 132–146)
TOTAL PROTEIN: 7.2 G/DL (ref 6.4–8.3)
WBC # BLD: 9.5 E9/L (ref 4.5–11.5)

## 2022-09-15 PROCEDURE — 6370000000 HC RX 637 (ALT 250 FOR IP): Performed by: PHYSICIAN ASSISTANT

## 2022-09-15 PROCEDURE — 82962 GLUCOSE BLOOD TEST: CPT

## 2022-09-15 PROCEDURE — 6360000002 HC RX W HCPCS: Performed by: INTERNAL MEDICINE

## 2022-09-15 PROCEDURE — C9113 INJ PANTOPRAZOLE SODIUM, VIA: HCPCS | Performed by: PHYSICIAN ASSISTANT

## 2022-09-15 PROCEDURE — 6360000002 HC RX W HCPCS: Performed by: PHYSICIAN ASSISTANT

## 2022-09-15 PROCEDURE — 99232 SBSQ HOSP IP/OBS MODERATE 35: CPT | Performed by: INTERNAL MEDICINE

## 2022-09-15 PROCEDURE — 85025 COMPLETE CBC W/AUTO DIFF WBC: CPT

## 2022-09-15 PROCEDURE — 6360000002 HC RX W HCPCS: Performed by: STUDENT IN AN ORGANIZED HEALTH CARE EDUCATION/TRAINING PROGRAM

## 2022-09-15 PROCEDURE — 1200000000 HC SEMI PRIVATE

## 2022-09-15 PROCEDURE — 6370000000 HC RX 637 (ALT 250 FOR IP): Performed by: INTERNAL MEDICINE

## 2022-09-15 PROCEDURE — APPSS60 APP SPLIT SHARED TIME 46-60 MINUTES: Performed by: PHYSICIAN ASSISTANT

## 2022-09-15 PROCEDURE — 83036 HEMOGLOBIN GLYCOSYLATED A1C: CPT

## 2022-09-15 PROCEDURE — 36415 COLL VENOUS BLD VENIPUNCTURE: CPT

## 2022-09-15 PROCEDURE — 80053 COMPREHEN METABOLIC PANEL: CPT

## 2022-09-15 RX ORDER — INSULIN LISPRO 100 [IU]/ML
0-4 INJECTION, SOLUTION INTRAVENOUS; SUBCUTANEOUS NIGHTLY
Status: DISCONTINUED | OUTPATIENT
Start: 2022-09-15 | End: 2022-09-18 | Stop reason: HOSPADM

## 2022-09-15 RX ORDER — PANTOPRAZOLE SODIUM 40 MG/10ML
40 INJECTION, POWDER, LYOPHILIZED, FOR SOLUTION INTRAVENOUS 2 TIMES DAILY
Status: DISCONTINUED | OUTPATIENT
Start: 2022-09-15 | End: 2022-09-17

## 2022-09-15 RX ORDER — INSULIN LISPRO 100 [IU]/ML
0-8 INJECTION, SOLUTION INTRAVENOUS; SUBCUTANEOUS
Status: DISCONTINUED | OUTPATIENT
Start: 2022-09-15 | End: 2022-09-18 | Stop reason: HOSPADM

## 2022-09-15 RX ORDER — NICOTINE 21 MG/24HR
1 PATCH, TRANSDERMAL 24 HOURS TRANSDERMAL DAILY
Status: DISCONTINUED | OUTPATIENT
Start: 2022-09-15 | End: 2022-09-18 | Stop reason: HOSPADM

## 2022-09-15 RX ORDER — LISINOPRIL 5 MG/1
5 TABLET ORAL DAILY
Status: DISCONTINUED | OUTPATIENT
Start: 2022-09-15 | End: 2022-09-17

## 2022-09-15 RX ORDER — PROCHLORPERAZINE EDISYLATE 5 MG/ML
10 INJECTION INTRAMUSCULAR; INTRAVENOUS EVERY 6 HOURS PRN
Status: DISCONTINUED | OUTPATIENT
Start: 2022-09-15 | End: 2022-09-17

## 2022-09-15 RX ORDER — METOCLOPRAMIDE HYDROCHLORIDE 5 MG/ML
20 INJECTION INTRAMUSCULAR; INTRAVENOUS
Status: DISCONTINUED | OUTPATIENT
Start: 2022-09-15 | End: 2022-09-17

## 2022-09-15 RX ORDER — AMLODIPINE BESYLATE 5 MG/1
5 TABLET ORAL DAILY
Status: DISCONTINUED | OUTPATIENT
Start: 2022-09-15 | End: 2022-09-15

## 2022-09-15 RX ORDER — LORAZEPAM 0.5 MG/1
0.5 TABLET ORAL EVERY 4 HOURS PRN
Status: DISCONTINUED | OUTPATIENT
Start: 2022-09-15 | End: 2022-09-16

## 2022-09-15 RX ADMIN — ENOXAPARIN SODIUM 40 MG: 100 INJECTION SUBCUTANEOUS at 08:47

## 2022-09-15 RX ADMIN — GABAPENTIN 100 MG: 100 CAPSULE ORAL at 13:33

## 2022-09-15 RX ADMIN — TRAZODONE HYDROCHLORIDE 300 MG: 150 TABLET ORAL at 21:00

## 2022-09-15 RX ADMIN — ONDANSETRON 4 MG: 2 INJECTION INTRAMUSCULAR; INTRAVENOUS at 08:47

## 2022-09-15 RX ADMIN — PANTOPRAZOLE SODIUM 40 MG: 40 INJECTION, POWDER, FOR SOLUTION INTRAVENOUS at 12:30

## 2022-09-15 RX ADMIN — PROCHLORPERAZINE EDISYLATE 10 MG: 5 INJECTION INTRAMUSCULAR; INTRAVENOUS at 19:35

## 2022-09-15 RX ADMIN — GABAPENTIN 100 MG: 100 CAPSULE ORAL at 21:00

## 2022-09-15 RX ADMIN — CLONAZEPAM 0.5 MG: 1 TABLET ORAL at 08:58

## 2022-09-15 RX ADMIN — PANTOPRAZOLE SODIUM 40 MG: 40 INJECTION, POWDER, FOR SOLUTION INTRAVENOUS at 21:00

## 2022-09-15 RX ADMIN — METOCLOPRAMIDE 20 MG: 5 INJECTION, SOLUTION INTRAMUSCULAR; INTRAVENOUS at 21:11

## 2022-09-15 RX ADMIN — ESCITALOPRAM OXALATE 20 MG: 20 TABLET ORAL at 13:33

## 2022-09-15 RX ADMIN — DROPERIDOL 2.5 MG: 2.5 INJECTION, SOLUTION INTRAMUSCULAR; INTRAVENOUS at 02:14

## 2022-09-15 RX ADMIN — PROCHLORPERAZINE EDISYLATE 10 MG: 5 INJECTION INTRAMUSCULAR; INTRAVENOUS at 13:33

## 2022-09-15 RX ADMIN — ENOXAPARIN SODIUM 40 MG: 100 INJECTION SUBCUTANEOUS at 21:00

## 2022-09-15 RX ADMIN — METOCLOPRAMIDE 20 MG: 5 INJECTION, SOLUTION INTRAMUSCULAR; INTRAVENOUS at 12:23

## 2022-09-15 RX ADMIN — GABAPENTIN 100 MG: 100 CAPSULE ORAL at 08:47

## 2022-09-15 RX ADMIN — ARIPIPRAZOLE 10 MG: 10 TABLET ORAL at 21:00

## 2022-09-15 RX ADMIN — INSULIN GLARGINE 10 UNITS: 100 INJECTION, SOLUTION SUBCUTANEOUS at 21:04

## 2022-09-15 RX ADMIN — LISINOPRIL 5 MG: 5 TABLET ORAL at 13:33

## 2022-09-15 NOTE — PROGRESS NOTES
[address] [place], [place] [address]ist Group   Progress Note      Subjective: Intractable nausea with the feeling of bloatedness and fullness that have been occurring over the last 3 months consistent with gastroparesis. REVIEW OF SYSTEMS:  no fevers, chills, cp, sob, ha, vision/hearing changes, wt changes, hot/cold flashes, other open skin lesions, diarrhea, constipation, dysuria/hematuria unless noted in HPI. Complete ROS performed with the patient and is otherwise negative. PHYSICAL EXAM:  BP (!) 145/76   Pulse 98   Temp 98.5 °F (36.9 °C) (Oral)   Resp 16   Ht 5' 11\" (1.803 m)   Wt (!) 340 lb (154.2 kg)   SpO2 94%   BMI 47.42 kg/m²   General Appearance: alert and oriented to person, place and time and in no acute distress, obese lying flat without shortness of breath PND, orthopnea  Skin: warm and dry  Head: normocephalic and atraumatic  Eyes: pupils equal, round, and reactive to light, extraocular eye movements intact, conjunctivae normal  Neck: neck supple and non tender without mass   Pulmonary/Chest: clear to auscultation bilaterally- no wheezes, rales or rhonchi, normal air movement, no respiratory distress  Cardiovascular: normal rate, normal S1 and S2 and no carotid bruits  Abdomen: soft, non-tender, non-distended, normal bowel sounds, no masses or organomegaly  Extremities: no cyanosis, no clubbing and no edema  Neurologic: no cranial nerve deficit and speech normal    LABS:  Recent Labs     09/12/22 1934 09/14/22 1311 09/14/22 1312 09/14/22  2215 09/15/22  0645     --  138  --  136   K 4.3  --  4.0  --  3.9     --  101  --  102   CO2 21*  --  24  --  23   BUN 10  --  12  --  11   CREATININE 0.9  --  0.9  --  1.0   GLUCOSE 157*   < > 168* 93 96   CALCIUM 9.5  --  10.1  --  9.2    < > = values in this interval not displayed.        Recent Labs     09/12/22  1934 09/14/22  1312 09/15/22  0645   WBC 9.5 12.3* 9.5   RBC 5.87* 5.94* 5.54   HGB 16.2 16.4 15.3   HCT 48.6 49.5 47.9   MCV 82.8 83.3 86.5   MCH 27.6 27.6 27.6   MCHC 33.3 33.1 31.9*   RDW 12.7 12.8 12.9    332 269   MPV 8.8 8.7 9.1     No results for input(s): POCGLU in the last 72 hours. Radiology: No results found. EKG: Normal sinus rhythm    ASSESSMENT:      Principal Problem:    Gastroparesis due to DM (HCC)  Active Problems:    Intractable nausea and vomiting    Tobacco abuse    Anxiety    Gastroesophageal reflux disease without esophagitis    Essential hypertension    Class 3 severe obesity due to excess calories with body mass index (BMI) of 45.0 to 49.9 in adult [facility])  Resolved Problems:    * No resolved hospital problems. *      PLAN:  Gastroparesis with intractable nausea and vomiting:   -Patient used to take cannabis as per patient now he stopped taking for last 2 weeks. His drug screen shows cannabis positive. -CT A/P shows no bowel obstruction, free air or focal inflammatory changes, redemonstration of fatty infiltration involving wall of right colon and cecum questionable chronic inflammatory bowel disease and ongoing visualization of prominent lymph nodes at level of asiya hepatis  -Continue IV fluid, IV PPI and change antiemetic to Compazine IV  -Increased IV Reglan to 20mg IV and made scheduled every 6 hours for intractable N/V.    -Maintain potassium greater than 4 magnesium greater than 2  -Diet full liquid if patient can tolerate. 2.  Diabetes mellitus with neuropathy:   -Continue Lantus and medium dose Humalog insulin coverage.  -Beta hydroxy butyrate elevated at 0.39, Venous pH 7.41  -Random glucoses have been in the 90s  -Check A1c  -Continue home gabapentin    3.    Transaminitis, rule out chronic inflammatory bowel disease:  -AST-->45-->52,  ALT-->90-->84  -CT A/P did not show any hepatic steatosis   -CT A/P shows no bowel obstruction, free air or focal inflammatory changes, redemonstration of fatty infiltration involving wall of right colon and cecum questionable chronic inflammatory bowel disease and ongoing visualization of prominent lymph nodes at level of asiya hepatis  -RUQ U/S 1/21 was unremarkable  -If labs do not improve will consider GI evaluation    4. Anxiety and depression:   -Continue home clonazepam , Abilify, trazodone and escitalopram.  -Patient wants to leave AMA and have began Ativan 0.5 every 4 hours as needed    5. Tobacco abuse:  -Begin NicoDerm patch to reduce cravings  -Proventil nebulizer 4 times a day as needed    6. Hypertension:  -May be secondary to discomfort, poor sleep  -We will begin low-dose lisinopril with parameters    Code Status: Full  DVT prophylaxis: Lovenox     NOTE: This report was transcribed using voice recognition software. Every effort was made to ensure accuracy; however, inadvertent computerized transcription errors may be present.      Electronically signed by Ebonie Cruz PA-C on 9/15/2022 at 11:49 AM

## 2022-09-15 NOTE — ED NOTES
Patient assessed by this RN. Patient resting comfortably at this time.  Will continue to monitor      Malorie Tyler RN  09/15/22 5221

## 2022-09-16 LAB
METER GLUCOSE: 109 MG/DL (ref 74–99)
METER GLUCOSE: 115 MG/DL (ref 74–99)
METER GLUCOSE: 124 MG/DL (ref 74–99)
METER GLUCOSE: 146 MG/DL (ref 74–99)

## 2022-09-16 PROCEDURE — 99232 SBSQ HOSP IP/OBS MODERATE 35: CPT | Performed by: INTERNAL MEDICINE

## 2022-09-16 PROCEDURE — 6370000000 HC RX 637 (ALT 250 FOR IP): Performed by: INTERNAL MEDICINE

## 2022-09-16 PROCEDURE — 6360000002 HC RX W HCPCS: Performed by: INTERNAL MEDICINE

## 2022-09-16 PROCEDURE — APPSS30 APP SPLIT SHARED TIME 16-30 MINUTES: Performed by: PHYSICIAN ASSISTANT

## 2022-09-16 PROCEDURE — 82962 GLUCOSE BLOOD TEST: CPT

## 2022-09-16 PROCEDURE — 2580000003 HC RX 258: Performed by: INTERNAL MEDICINE

## 2022-09-16 PROCEDURE — 6370000000 HC RX 637 (ALT 250 FOR IP): Performed by: PHYSICIAN ASSISTANT

## 2022-09-16 PROCEDURE — 6360000002 HC RX W HCPCS: Performed by: PHYSICIAN ASSISTANT

## 2022-09-16 PROCEDURE — 1200000000 HC SEMI PRIVATE

## 2022-09-16 PROCEDURE — C9113 INJ PANTOPRAZOLE SODIUM, VIA: HCPCS | Performed by: PHYSICIAN ASSISTANT

## 2022-09-16 RX ORDER — SODIUM CHLORIDE 0.9 % (FLUSH) 0.9 %
5-40 SYRINGE (ML) INJECTION EVERY 12 HOURS SCHEDULED
Status: DISCONTINUED | OUTPATIENT
Start: 2022-09-16 | End: 2022-09-18 | Stop reason: HOSPADM

## 2022-09-16 RX ORDER — SODIUM CHLORIDE 9 MG/ML
INJECTION, SOLUTION INTRAVENOUS PRN
Status: DISCONTINUED | OUTPATIENT
Start: 2022-09-16 | End: 2022-09-18 | Stop reason: HOSPADM

## 2022-09-16 RX ORDER — SODIUM CHLORIDE 0.9 % (FLUSH) 0.9 %
5-40 SYRINGE (ML) INJECTION PRN
Status: DISCONTINUED | OUTPATIENT
Start: 2022-09-16 | End: 2022-09-18 | Stop reason: HOSPADM

## 2022-09-16 RX ORDER — PROMETHAZINE HYDROCHLORIDE 25 MG/ML
6.25 INJECTION, SOLUTION INTRAMUSCULAR; INTRAVENOUS EVERY 6 HOURS PRN
Status: DISCONTINUED | OUTPATIENT
Start: 2022-09-16 | End: 2022-09-17

## 2022-09-16 RX ORDER — HEPARIN SODIUM (PORCINE) LOCK FLUSH IV SOLN 100 UNIT/ML 100 UNIT/ML
3 SOLUTION INTRAVENOUS EVERY 12 HOURS SCHEDULED
Status: DISCONTINUED | OUTPATIENT
Start: 2022-09-16 | End: 2022-09-18 | Stop reason: HOSPADM

## 2022-09-16 RX ORDER — HYDROXYZINE HYDROCHLORIDE 25 MG/1
50 TABLET, FILM COATED ORAL 3 TIMES DAILY PRN
Status: DISCONTINUED | OUTPATIENT
Start: 2022-09-16 | End: 2022-09-17

## 2022-09-16 RX ORDER — HEPARIN SODIUM (PORCINE) LOCK FLUSH IV SOLN 100 UNIT/ML 100 UNIT/ML
3 SOLUTION INTRAVENOUS PRN
Status: DISCONTINUED | OUTPATIENT
Start: 2022-09-16 | End: 2022-09-18 | Stop reason: HOSPADM

## 2022-09-16 RX ADMIN — SODIUM CHLORIDE: 9 INJECTION, SOLUTION INTRAVENOUS at 10:54

## 2022-09-16 RX ADMIN — TRAZODONE HYDROCHLORIDE 300 MG: 150 TABLET ORAL at 21:40

## 2022-09-16 RX ADMIN — GABAPENTIN 100 MG: 100 CAPSULE ORAL at 15:25

## 2022-09-16 RX ADMIN — ESCITALOPRAM OXALATE 20 MG: 20 TABLET ORAL at 09:29

## 2022-09-16 RX ADMIN — GABAPENTIN 100 MG: 100 CAPSULE ORAL at 09:22

## 2022-09-16 RX ADMIN — METOCLOPRAMIDE 20 MG: 5 INJECTION, SOLUTION INTRAMUSCULAR; INTRAVENOUS at 10:48

## 2022-09-16 RX ADMIN — CLONAZEPAM 0.5 MG: 1 TABLET ORAL at 21:52

## 2022-09-16 RX ADMIN — LISINOPRIL 5 MG: 5 TABLET ORAL at 09:22

## 2022-09-16 RX ADMIN — HYDROXYZINE HYDROCHLORIDE 50 MG: 25 TABLET, FILM COATED ORAL at 15:23

## 2022-09-16 RX ADMIN — METOCLOPRAMIDE 20 MG: 5 INJECTION, SOLUTION INTRAMUSCULAR; INTRAVENOUS at 06:20

## 2022-09-16 RX ADMIN — PANTOPRAZOLE SODIUM 40 MG: 40 INJECTION, POWDER, FOR SOLUTION INTRAVENOUS at 09:22

## 2022-09-16 RX ADMIN — PROMETHAZINE HYDROCHLORIDE 6.25 MG: 25 INJECTION INTRAMUSCULAR; INTRAVENOUS at 15:56

## 2022-09-16 RX ADMIN — PROCHLORPERAZINE EDISYLATE 10 MG: 5 INJECTION INTRAMUSCULAR; INTRAVENOUS at 09:09

## 2022-09-16 RX ADMIN — PROMETHAZINE HYDROCHLORIDE 6.25 MG: 25 INJECTION INTRAMUSCULAR; INTRAVENOUS at 21:41

## 2022-09-16 RX ADMIN — ENOXAPARIN SODIUM 40 MG: 100 INJECTION SUBCUTANEOUS at 21:39

## 2022-09-16 RX ADMIN — ARIPIPRAZOLE 10 MG: 10 TABLET ORAL at 21:40

## 2022-09-16 RX ADMIN — Medication 10 ML: at 09:23

## 2022-09-16 RX ADMIN — PANTOPRAZOLE SODIUM 40 MG: 40 INJECTION, POWDER, FOR SOLUTION INTRAVENOUS at 21:39

## 2022-09-16 RX ADMIN — INSULIN GLARGINE 10 UNITS: 100 INJECTION, SOLUTION SUBCUTANEOUS at 21:41

## 2022-09-16 RX ADMIN — METOCLOPRAMIDE 20 MG: 5 INJECTION, SOLUTION INTRAMUSCULAR; INTRAVENOUS at 21:40

## 2022-09-16 RX ADMIN — PROCHLORPERAZINE EDISYLATE 10 MG: 5 INJECTION INTRAMUSCULAR; INTRAVENOUS at 03:07

## 2022-09-16 RX ADMIN — GABAPENTIN 100 MG: 100 CAPSULE ORAL at 21:39

## 2022-09-16 NOTE — PROGRESS NOTES
[address] [place], [place] [address]ist Group   Progress Note      Subjective: Continues with intractable nausea with the feeling of bloatedness and fullness that have been occurring over the last 3 months consistent with gastroparesis. Reticent to try Phenergan because it is IM. REVIEW OF SYSTEMS:  no fevers, chills, cp, sob, ha, vision/hearing changes, wt changes, hot/cold flashes, other open skin lesions, diarrhea, constipation, dysuria/hematuria unless noted in HPI. Complete ROS performed with the patient and is otherwise negative.     PHYSICAL EXAM:  BP (!) 142/76   Pulse 77   Temp 98.3 °F (36.8 °C) (Oral)   Resp 17   Ht 5' 11\" (1.803 m)   Wt (!) 340 lb (154.2 kg)   SpO2 98%   BMI 47.42 kg/m²   General Appearance: alert and oriented to person, place and time and in no acute distress, obese lying flat without shortness of breath PND, orthopnea  Skin: warm and dry  Head: normocephalic and atraumatic  Eyes: pupils equal, round, and reactive to light, extraocular eye movements intact, conjunctivae normal  Neck: neck supple and non tender without mass   Pulmonary/Chest: clear to auscultation bilaterally- no wheezes, rales or rhonchi, normal air movement, no respiratory distress  Cardiovascular: normal rate, normal S1 and S2 and no carotid bruits  Abdomen: soft, non-tender, non-distended, obese, normal bowel sounds, no masses or organomegaly  Extremities: no cyanosis, no clubbing and no edema  Neurologic: no cranial nerve deficit and speech normal    LABS:  Recent Labs     09/14/22  1312 09/14/22  2215 09/15/22  0645     --  136   K 4.0  --  3.9     --  102   CO2 24  --  23   BUN 12  --  11   CREATININE 0.9  --  1.0   GLUCOSE 168* 93 96   CALCIUM 10.1  --  9.2         Recent Labs     09/14/22  1312 09/15/22  0645   WBC 12.3* 9.5   RBC 5.94* 5.54   HGB 16.4 15.3   HCT 49.5 47.9   MCV 83.3 86.5   MCH 27.6 27.6   MCHC 33.1 31.9*   RDW 12.8 12.9    269   MPV 8.7 9.1       No results for input(s): POCGLU in the last 72 hours. Radiology: No results found. EKG: Normal sinus rhythm    ASSESSMENT:      Principal Problem:    Gastroparesis due to DM (HCC)  Active Problems:    Intractable nausea and vomiting    Tobacco abuse    Anxiety    Gastroesophageal reflux disease without esophagitis    Essential hypertension    Class 3 severe obesity due to excess calories with body mass index (BMI) of 45.0 to 49.9 in adult [facility])  Resolved Problems:    * No resolved hospital problems. *      PLAN:  Gastroparesis with intractable nausea and vomiting:   -Patient used to take cannabis as per patient now he stopped taking for last 2 weeks. His drug screen shows cannabis positive. -CT A/P shows no bowel obstruction, free air or focal inflammatory changes, redemonstration of fatty infiltration involving wall of right colon and cecum questionable chronic inflammatory bowel disease and ongoing visualization of prominent lymph nodes at level of asiya hepatis  -Continue IV fluid, IV PPI and change antiemetic to Phenergan IM  -Continue IV Reglan to 20mg IV and made scheduled every 6 hours for intractable N/V.    -Maintain potassium greater than 4 magnesium greater than 2  -Diet full liquid if patient can tolerate.  -Discussed with patient at length regarding the need for improved diabetic management, weight loss, to improve the outcome of gastroparesis    2. Diabetes mellitus with neuropathy:   -Continue Lantus and medium dose Humalog insulin coverage.  -Beta hydroxy butyrate elevated at 0.39, Venous pH 7.41  -Random glucoses have been in the 90s-120's  -A1c 8.1  -Continue home gabapentin    3.    Transaminitis, rule out chronic inflammatory bowel disease:  -AST-->45-->52,  ALT-->90-->84  -CT A/P did not show any hepatic steatosis   -CT A/P shows no bowel obstruction, free air or focal inflammatory changes, redemonstration of fatty infiltration involving wall of right colon and cecum questionable chronic inflammatory bowel disease and ongoing visualization of prominent lymph nodes at level of asiya hepatis  -RUQ U/S 1/21 was unremarkable  -If labs do not improve will consider GI evaluation    4. Anxiety and depression:   -Continue home clonazepam , Abilify, trazodone and escitalopram.  -Trial of hydroxyzine for anxiety    5. Tobacco abuse:  -Begin NicoDerm patch to reduce cravings  -Proventil nebulizer 4 times a day as needed    6. Hypertension:  -May be secondary to discomfort, poor sleep  -We will begin low-dose lisinopril with parameters    Code Status: Full  DVT prophylaxis: Lovenox     NOTE: This report was transcribed using voice recognition software. Every effort was made to ensure accuracy; however, inadvertent computerized transcription errors may be present.      Electronically signed by Ebonie Cruz PA-C, Ph.D.on 9/16/2022 at 2:57 PM

## 2022-09-17 LAB
ALBUMIN SERPL-MCNC: 4 G/DL (ref 3.5–5.2)
ALP BLD-CCNC: 81 U/L (ref 40–129)
ALT SERPL-CCNC: 76 U/L (ref 0–40)
AMPHETAMINE SCREEN, URINE: NOT DETECTED
ANION GAP SERPL CALCULATED.3IONS-SCNC: 15 MMOL/L (ref 7–16)
AST SERPL-CCNC: 37 U/L (ref 0–39)
BARBITURATE SCREEN URINE: NOT DETECTED
BENZODIAZEPINE SCREEN, URINE: NOT DETECTED
BILIRUB SERPL-MCNC: 0.4 MG/DL (ref 0–1.2)
BILIRUBIN URINE: NEGATIVE
BLOOD, URINE: ABNORMAL
BUN BLDV-MCNC: 7 MG/DL (ref 6–20)
CALCIUM SERPL-MCNC: 9.4 MG/DL (ref 8.6–10.2)
CANNABINOID SCREEN URINE: POSITIVE
CHLORIDE BLD-SCNC: 103 MMOL/L (ref 98–107)
CLARITY: CLEAR
CO2: 23 MMOL/L (ref 22–29)
COCAINE METABOLITE SCREEN URINE: NOT DETECTED
COLOR: YELLOW
CREAT SERPL-MCNC: 0.9 MG/DL (ref 0.7–1.2)
FENTANYL SCREEN, URINE: NOT DETECTED
GFR AFRICAN AMERICAN: >60
GFR NON-AFRICAN AMERICAN: >60 ML/MIN/1.73
GLUCOSE BLD-MCNC: 149 MG/DL (ref 74–99)
GLUCOSE URINE: 500 MG/DL
KETONES, URINE: NEGATIVE MG/DL
LEUKOCYTE ESTERASE, URINE: NEGATIVE
Lab: ABNORMAL
METER GLUCOSE: 105 MG/DL (ref 74–99)
METER GLUCOSE: 113 MG/DL (ref 74–99)
METER GLUCOSE: 145 MG/DL (ref 74–99)
METER GLUCOSE: 189 MG/DL (ref 74–99)
METHADONE SCREEN, URINE: NOT DETECTED
NITRITE, URINE: NEGATIVE
OPIATE SCREEN URINE: NOT DETECTED
OXYCODONE URINE: NOT DETECTED
PH UA: 6 (ref 5–9)
PHENCYCLIDINE SCREEN URINE: NOT DETECTED
POTASSIUM SERPL-SCNC: 3.6 MMOL/L (ref 3.5–5)
PROTEIN UA: NEGATIVE MG/DL
SODIUM BLD-SCNC: 141 MMOL/L (ref 132–146)
SPECIFIC GRAVITY UA: 1.01 (ref 1–1.03)
TOTAL PROTEIN: 7 G/DL (ref 6.4–8.3)
UROBILINOGEN, URINE: 0.2 E.U./DL

## 2022-09-17 PROCEDURE — 99232 SBSQ HOSP IP/OBS MODERATE 35: CPT | Performed by: INTERNAL MEDICINE

## 2022-09-17 PROCEDURE — 6370000000 HC RX 637 (ALT 250 FOR IP): Performed by: PHYSICIAN ASSISTANT

## 2022-09-17 PROCEDURE — 81001 URINALYSIS AUTO W/SCOPE: CPT

## 2022-09-17 PROCEDURE — 82962 GLUCOSE BLOOD TEST: CPT

## 2022-09-17 PROCEDURE — 6360000002 HC RX W HCPCS: Performed by: INTERNAL MEDICINE

## 2022-09-17 PROCEDURE — 36415 COLL VENOUS BLD VENIPUNCTURE: CPT

## 2022-09-17 PROCEDURE — 1200000000 HC SEMI PRIVATE

## 2022-09-17 PROCEDURE — 83036 HEMOGLOBIN GLYCOSYLATED A1C: CPT

## 2022-09-17 PROCEDURE — 80307 DRUG TEST PRSMV CHEM ANLYZR: CPT

## 2022-09-17 PROCEDURE — C9113 INJ PANTOPRAZOLE SODIUM, VIA: HCPCS | Performed by: PHYSICIAN ASSISTANT

## 2022-09-17 PROCEDURE — 6370000000 HC RX 637 (ALT 250 FOR IP): Performed by: INTERNAL MEDICINE

## 2022-09-17 PROCEDURE — 6360000002 HC RX W HCPCS: Performed by: PHYSICIAN ASSISTANT

## 2022-09-17 PROCEDURE — 2580000003 HC RX 258: Performed by: PHYSICIAN ASSISTANT

## 2022-09-17 PROCEDURE — APPSS45 APP SPLIT SHARED TIME 31-45 MINUTES: Performed by: PHYSICIAN ASSISTANT

## 2022-09-17 PROCEDURE — 80053 COMPREHEN METABOLIC PANEL: CPT

## 2022-09-17 RX ORDER — ONDANSETRON 2 MG/ML
4 INJECTION INTRAMUSCULAR; INTRAVENOUS EVERY 6 HOURS PRN
Status: DISCONTINUED | OUTPATIENT
Start: 2022-09-17 | End: 2022-09-18 | Stop reason: HOSPADM

## 2022-09-17 RX ORDER — LISINOPRIL 5 MG/1
5 TABLET ORAL 2 TIMES DAILY
Status: DISCONTINUED | OUTPATIENT
Start: 2022-09-17 | End: 2022-09-17

## 2022-09-17 RX ORDER — PROMETHAZINE HYDROCHLORIDE 25 MG/1
25 TABLET ORAL EVERY 6 HOURS PRN
Status: DISCONTINUED | OUTPATIENT
Start: 2022-09-17 | End: 2022-09-18 | Stop reason: HOSPADM

## 2022-09-17 RX ORDER — LISINOPRIL 20 MG/1
20 TABLET ORAL 2 TIMES DAILY
Status: DISCONTINUED | OUTPATIENT
Start: 2022-09-17 | End: 2022-09-18 | Stop reason: HOSPADM

## 2022-09-17 RX ORDER — POTASSIUM CHLORIDE 20 MEQ/1
40 TABLET, EXTENDED RELEASE ORAL ONCE
Status: COMPLETED | OUTPATIENT
Start: 2022-09-17 | End: 2022-09-17

## 2022-09-17 RX ORDER — PROMETHAZINE HYDROCHLORIDE 25 MG/ML
6.25 INJECTION, SOLUTION INTRAMUSCULAR; INTRAVENOUS EVERY 6 HOURS PRN
Status: DISCONTINUED | OUTPATIENT
Start: 2022-09-17 | End: 2022-09-18 | Stop reason: HOSPADM

## 2022-09-17 RX ORDER — SODIUM CHLORIDE 9 MG/ML
INJECTION, SOLUTION INTRAVENOUS CONTINUOUS
Status: ACTIVE | OUTPATIENT
Start: 2022-09-17 | End: 2022-09-17

## 2022-09-17 RX ORDER — HYDROXYZINE HYDROCHLORIDE 25 MG/1
50 TABLET, FILM COATED ORAL 4 TIMES DAILY
Status: DISCONTINUED | OUTPATIENT
Start: 2022-09-17 | End: 2022-09-18 | Stop reason: HOSPADM

## 2022-09-17 RX ORDER — METOCLOPRAMIDE 10 MG/1
20 TABLET ORAL
Status: DISCONTINUED | OUTPATIENT
Start: 2022-09-17 | End: 2022-09-18 | Stop reason: HOSPADM

## 2022-09-17 RX ORDER — LISINOPRIL 5 MG/1
5 TABLET ORAL ONCE
Status: COMPLETED | OUTPATIENT
Start: 2022-09-17 | End: 2022-09-17

## 2022-09-17 RX ORDER — LISINOPRIL 10 MG/1
10 TABLET ORAL 2 TIMES DAILY
Status: DISCONTINUED | OUTPATIENT
Start: 2022-09-17 | End: 2022-09-17

## 2022-09-17 RX ORDER — METOCLOPRAMIDE HYDROCHLORIDE 5 MG/ML
20 INJECTION INTRAMUSCULAR; INTRAVENOUS EVERY 6 HOURS PRN
Status: DISCONTINUED | OUTPATIENT
Start: 2022-09-17 | End: 2022-09-18 | Stop reason: HOSPADM

## 2022-09-17 RX ORDER — METOCLOPRAMIDE HYDROCHLORIDE 5 MG/ML
20 INJECTION INTRAMUSCULAR; INTRAVENOUS EVERY 6 HOURS PRN
Status: DISCONTINUED | OUTPATIENT
Start: 2022-09-17 | End: 2022-09-17

## 2022-09-17 RX ORDER — ONDANSETRON 4 MG/1
4 TABLET, ORALLY DISINTEGRATING ORAL EVERY 8 HOURS PRN
Status: DISCONTINUED | OUTPATIENT
Start: 2022-09-17 | End: 2022-09-18 | Stop reason: HOSPADM

## 2022-09-17 RX ORDER — CLONAZEPAM 0.5 MG/1
0.5 TABLET ORAL EVERY 12 HOURS
Status: DISCONTINUED | OUTPATIENT
Start: 2022-09-17 | End: 2022-09-18 | Stop reason: HOSPADM

## 2022-09-17 RX ADMIN — METOCLOPRAMIDE 20 MG: 5 INJECTION, SOLUTION INTRAMUSCULAR; INTRAVENOUS at 09:56

## 2022-09-17 RX ADMIN — INSULIN GLARGINE 10 UNITS: 100 INJECTION, SOLUTION SUBCUTANEOUS at 21:13

## 2022-09-17 RX ADMIN — ARIPIPRAZOLE 10 MG: 10 TABLET ORAL at 21:08

## 2022-09-17 RX ADMIN — ENOXAPARIN SODIUM 40 MG: 100 INJECTION SUBCUTANEOUS at 21:08

## 2022-09-17 RX ADMIN — GABAPENTIN 100 MG: 100 CAPSULE ORAL at 21:08

## 2022-09-17 RX ADMIN — GABAPENTIN 100 MG: 100 CAPSULE ORAL at 09:43

## 2022-09-17 RX ADMIN — ESCITALOPRAM OXALATE 20 MG: 20 TABLET ORAL at 11:51

## 2022-09-17 RX ADMIN — PROMETHAZINE HYDROCHLORIDE 6.25 MG: 25 INJECTION INTRAMUSCULAR; INTRAVENOUS at 04:23

## 2022-09-17 RX ADMIN — LISINOPRIL 20 MG: 20 TABLET ORAL at 21:08

## 2022-09-17 RX ADMIN — LISINOPRIL 5 MG: 5 TABLET ORAL at 09:44

## 2022-09-17 RX ADMIN — ENOXAPARIN SODIUM 40 MG: 100 INJECTION SUBCUTANEOUS at 09:46

## 2022-09-17 RX ADMIN — CLONAZEPAM 0.5 MG: 0.5 TABLET ORAL at 21:07

## 2022-09-17 RX ADMIN — METOCLOPRAMIDE 20 MG: 10 TABLET ORAL at 16:15

## 2022-09-17 RX ADMIN — LISINOPRIL 5 MG: 5 TABLET ORAL at 13:47

## 2022-09-17 RX ADMIN — CLONAZEPAM 0.5 MG: 0.5 TABLET ORAL at 09:43

## 2022-09-17 RX ADMIN — SODIUM CHLORIDE: 9 INJECTION, SOLUTION INTRAVENOUS at 11:36

## 2022-09-17 RX ADMIN — POTASSIUM CHLORIDE 40 MEQ: 1500 TABLET, EXTENDED RELEASE ORAL at 11:51

## 2022-09-17 RX ADMIN — HYDROXYZINE HYDROCHLORIDE 50 MG: 25 TABLET ORAL at 21:08

## 2022-09-17 RX ADMIN — GABAPENTIN 100 MG: 100 CAPSULE ORAL at 13:47

## 2022-09-17 RX ADMIN — TRAZODONE HYDROCHLORIDE 300 MG: 150 TABLET ORAL at 21:07

## 2022-09-17 RX ADMIN — SODIUM CHLORIDE, PRESERVATIVE FREE 10 ML: 5 INJECTION INTRAVENOUS at 21:09

## 2022-09-17 RX ADMIN — METOCLOPRAMIDE 20 MG: 10 TABLET ORAL at 21:07

## 2022-09-17 RX ADMIN — PANTOPRAZOLE SODIUM 40 MG: 40 INJECTION, POWDER, FOR SOLUTION INTRAVENOUS at 09:56

## 2022-09-17 RX ADMIN — HYDROXYZINE HYDROCHLORIDE 50 MG: 25 TABLET ORAL at 09:43

## 2022-09-17 RX ADMIN — HYDROXYZINE HYDROCHLORIDE 50 MG: 25 TABLET ORAL at 16:15

## 2022-09-17 RX ADMIN — PROMETHAZINE HYDROCHLORIDE 25 MG: 25 TABLET ORAL at 11:40

## 2022-09-17 ASSESSMENT — PAIN SCALES - GENERAL
PAINLEVEL_OUTOF10: 0
PAINLEVEL_OUTOF10: 0

## 2022-09-17 NOTE — PROGRESS NOTES
[address] [place], [place] [address]ist Group   Progress Note      Subjective: The intractable nausea with the feeling of bloatedness and fullness which have been consistent with gastroparesis is markedly improved last 18 hours. Phenergan is given patient good results with reduction of his nausea    REVIEW OF SYSTEMS:  no fevers, chills, cp, sob, ha, vision/hearing changes, wt changes, hot/cold flashes, other open skin lesions, diarrhea, constipation, dysuria/hematuria unless noted in HPI. Complete ROS performed with the patient and is otherwise negative. PHYSICAL EXAM:  BP (!) 154/81   Pulse 67   Temp 98.2 °F (36.8 °C) (Oral)   Resp 16   Ht 5' 11\" (1.803 m)   Wt (!) 340 lb (154.2 kg)   SpO2 95%   BMI 47.42 kg/m²   General Appearance: alert and oriented to person, place and time and in no acute distress, obese lying flat without shortness of breath PND, orthopnea and looks much improved.   Skin: warm and dry  Head: normocephalic and atraumatic  Eyes: pupils equal, round, and reactive to light, extraocular eye movements intact, conjunctivae normal  Neck: neck supple and non tender without mass   Pulmonary/Chest: clear to auscultation bilaterally- no wheezes, rales or rhonchi, normal air movement, no respiratory distress  Cardiovascular: normal rate, normal S1 and S2 and no carotid bruits  Abdomen: soft, non-tender, non-distended, obese, normal bowel sounds, no masses or organomegaly  Extremities: no cyanosis, no clubbing and no edema  Neurologic: no cranial nerve deficit and speech normal    LABS:  Recent Labs     09/14/22  1312 09/14/22  2215 09/15/22  0645 09/17/22  0500     --  136 141   K 4.0  --  3.9 3.6     --  102 103   CO2 24  --  23 23   BUN 12  --  11 7   CREATININE 0.9  --  1.0 0.9   GLUCOSE 168* 93 96 149*   CALCIUM 10.1  --  9.2 9.4         Recent Labs     09/14/22  1312 09/15/22  0645   WBC 12.3* 9.5   RBC 5.94* 5.54   HGB 16.4 15.3   HCT 49.5 47.9   MCV 83.3 86.5   MCH 27.6 27.6 MCHC 33.1 31.9*   RDW 12.8 12.9    269   MPV 8.7 9.1       No results for input(s): POCGLU in the last 72 hours. Radiology: No results found. EKG: Normal sinus rhythm    ASSESSMENT:      Principal Problem:    Gastroparesis due to DM (HCC)  Active Problems:    Intractable nausea and vomiting    Tobacco abuse    Anxiety    Gastroesophageal reflux disease without esophagitis    Essential hypertension    Class 3 severe obesity due to excess calories with body mass index (BMI) of 45.0 to 49.9 in adult [facility])  Resolved Problems:    * No resolved hospital problems. *      PLAN:  Gastroparesis/intractable nausea and vomiting improving:   -Patient used to take cannabis as per patient now he stopped taking for last 2 weeks. His drug screen shows cannabis positive. -CT A/P shows no bowel obstruction, free air or focal inflammatory changes, redemonstration of fatty infiltration involving wall of right colon and cecum questionable chronic inflammatory bowel disease and ongoing visualization of prominent lymph nodes at level of asiya hepatis  -Change IV PPI, metoclopramide IV, Phenergan IM to PO and use the IV and IM only if fails oral and advance diet which is full liquid, now advancing as tolerated as patient is markedly improving  - mL over 5 hours then Hep-Lock IV  -Maintain potassium greater than 4 magnesium greater than 2  -Discussed with patient at length regarding the need for improved diabetic management, weight loss, to improve the outcome of gastroparesis, as well as the importance of discontinuation of cannabis. 2.  Diabetes mellitus with neuropathy improving:   -Continue Lantus and medium dose Humalog insulin coverage.  -Beta hydroxy butyrate elevated at 0.39, Venous pH 7.41  -Random glucoses have been in the 105's-160's  -A1c 8.1  -Continue home gabapentin    3.    Transaminitis improving, rule out chronic inflammatory bowel disease:  -AST-->45-->52-->37,  ALT-->90-->84-->76  -CT A/P did

## 2022-09-18 VITALS
RESPIRATION RATE: 20 BRPM | SYSTOLIC BLOOD PRESSURE: 161 MMHG | OXYGEN SATURATION: 98 % | DIASTOLIC BLOOD PRESSURE: 99 MMHG | TEMPERATURE: 97.5 F | WEIGHT: 315 LBS | HEIGHT: 71 IN | HEART RATE: 66 BPM | BODY MASS INDEX: 44.1 KG/M2

## 2022-09-18 LAB — METER GLUCOSE: 166 MG/DL (ref 74–99)

## 2022-09-18 PROCEDURE — APPSS60 APP SPLIT SHARED TIME 46-60 MINUTES: Performed by: PHYSICIAN ASSISTANT

## 2022-09-18 PROCEDURE — 6370000000 HC RX 637 (ALT 250 FOR IP): Performed by: INTERNAL MEDICINE

## 2022-09-18 PROCEDURE — 6370000000 HC RX 637 (ALT 250 FOR IP): Performed by: PHYSICIAN ASSISTANT

## 2022-09-18 PROCEDURE — 99239 HOSP IP/OBS DSCHRG MGMT >30: CPT | Performed by: INTERNAL MEDICINE

## 2022-09-18 PROCEDURE — 82962 GLUCOSE BLOOD TEST: CPT

## 2022-09-18 RX ORDER — AMLODIPINE BESYLATE 10 MG/1
10 TABLET ORAL DAILY
Status: DISCONTINUED | OUTPATIENT
Start: 2022-09-18 | End: 2022-09-18 | Stop reason: HOSPADM

## 2022-09-18 RX ORDER — AMLODIPINE BESYLATE 10 MG/1
10 TABLET ORAL DAILY
Qty: 30 TABLET | Refills: 0 | Status: SHIPPED | OUTPATIENT
Start: 2022-09-19

## 2022-09-18 RX ORDER — PANTOPRAZOLE SODIUM 20 MG/1
40 TABLET, DELAYED RELEASE ORAL DAILY
Qty: 30 TABLET | Refills: 2 | Status: SHIPPED | OUTPATIENT
Start: 2022-09-18 | End: 2022-10-24

## 2022-09-18 RX ORDER — LISINOPRIL 20 MG/1
20 TABLET ORAL 2 TIMES DAILY
Qty: 60 TABLET | Refills: 0 | Status: SHIPPED | OUTPATIENT
Start: 2022-09-18 | End: 2022-10-18

## 2022-09-18 RX ORDER — PROMETHAZINE HYDROCHLORIDE 25 MG/1
25 TABLET ORAL EVERY 6 HOURS PRN
Qty: 120 TABLET | Refills: 0 | Status: SHIPPED | OUTPATIENT
Start: 2022-09-18 | End: 2022-10-18

## 2022-09-18 RX ORDER — INSULIN LISPRO 100 [IU]/ML
0-8 INJECTION, SOLUTION INTRAVENOUS; SUBCUTANEOUS
Qty: 1 EACH | Refills: 0 | Status: SHIPPED | OUTPATIENT
Start: 2022-09-18 | End: 2022-10-14 | Stop reason: SDUPTHER

## 2022-09-18 RX ORDER — INSULIN LISPRO 100 [IU]/ML
0-4 INJECTION, SOLUTION INTRAVENOUS; SUBCUTANEOUS NIGHTLY
Qty: 1 EACH | Refills: 0 | Status: SHIPPED | OUTPATIENT
Start: 2022-09-18 | End: 2022-10-24 | Stop reason: SDUPTHER

## 2022-09-18 RX ORDER — METOCLOPRAMIDE 10 MG/1
20 TABLET ORAL
Qty: 120 TABLET | Refills: 0 | Status: SHIPPED | OUTPATIENT
Start: 2022-09-18 | End: 2022-10-18

## 2022-09-18 RX ADMIN — METOCLOPRAMIDE 20 MG: 10 TABLET ORAL at 05:59

## 2022-09-18 RX ADMIN — CLONAZEPAM 0.5 MG: 0.5 TABLET ORAL at 08:49

## 2022-09-18 RX ADMIN — LISINOPRIL 20 MG: 20 TABLET ORAL at 08:49

## 2022-09-18 RX ADMIN — GABAPENTIN 100 MG: 100 CAPSULE ORAL at 08:50

## 2022-09-18 RX ADMIN — PROMETHAZINE HYDROCHLORIDE 25 MG: 25 TABLET ORAL at 07:38

## 2022-09-18 RX ADMIN — ESCITALOPRAM OXALATE 20 MG: 20 TABLET ORAL at 09:35

## 2022-09-18 RX ADMIN — AMLODIPINE BESYLATE 10 MG: 10 TABLET ORAL at 08:50

## 2022-09-18 RX ADMIN — HYDROXYZINE HYDROCHLORIDE 50 MG: 25 TABLET ORAL at 08:50

## 2022-09-18 ASSESSMENT — PAIN SCALES - GENERAL: PAINLEVEL_OUTOF10: 0

## 2022-09-18 NOTE — DISCHARGE SUMMARY
[facility] Physician Discharge Summary       Diego Abdul [id number]  [phone]    Go in 1 day(s)  Follow-up for your hospitalization. Activity level: As tolerated    Diet: ADULT DIET; Regular; 5 carb choices (75 gm/meal)    Labs: Per your primary care provider    Condition at discharge: Improving    Dispo: Home    Patient ID:  Bebo Lockhart  [id number]  14 y.o.  1985    Admit date: 9/14/2022    Discharge date and time:  9/18/2022  10:16 AM    Admission Diagnoses: Principal Problem:    Intractable cyclical vomiting with nausea  Active Problems:    Gastroparesis due to DM (HCC)    Tobacco abuse    Anxiety    Gastroesophageal reflux disease without esophagitis    Essential hypertension    Class 3 severe obesity due to excess calories with body mass index (BMI) of 45.0 to 49.9 in [facility])  Resolved Problems:    * No resolved hospital problems. *      Discharge Diagnoses: Principal Problem:    Intractable cyclical vomiting with nausea  Active Problems:    Gastroparesis due to DM (HCC)    Tobacco abuse    Anxiety    Gastroesophageal reflux disease without esophagitis    Essential hypertension    Class 3 severe obesity due to excess calories with body mass index (BMI) of 45.0 to 49.9 in [facility])  Resolved Problems:    * No resolved hospital problems. *      Consults:  None    Procedures: None    Hospital Course:   Probable gastroparesis/intractable nausea and vomiting   improving:   -Patient used to take cannabis as per patient now he stopped taking for last 2 weeks. His drug screen shows cannabis positive.     -CT A/P shows no bowel obstruction, free air or focal inflammatory changes, redemonstration of fatty infiltration involving wall of right colon and cecum questionable chronic inflammatory bowel disease and ongoing visualization of prominent lymph nodes at level of asiya hepatis  -Change IV PPI, metoclopramide IV, Phenergan IM to PO and use the IV and IM only if fails oral and advance diet which is full liquid, now advancing as tolerated as patient is markedly improving  - mL over 5 hours then Hep-Lock IV  -Maintain potassium greater than 4 magnesium greater than 2  -Discussed with patient at length regarding the need for improved diabetic management, weight loss, to improve the outcome of gastroparesis, as well as the importance of discontinuation of cannabis. 2.  Diabetes mellitus with neuropathy improving:   -Continue Lantus and medium dose Humalog insulin coverage.  -Beta hydroxy butyrate elevated at 0.39, Venous pH 7.41  -Random glucoses have been in the 105's-160's  -A1c 8.1  -Continue home gabapentin     3. Transaminitis improving, rule out chronic inflammatory bowel disease:  -AST-->45-->52-->37,  ALT-->90-->84-->76  -CT A/P did not show any hepatic steatosis, bowel obstruction, free air or focal inflammatory changes, redemonstration of fatty infiltration involving wall of right colon and cecum questionable chronic inflammatory bowel disease and ongoing visualization of prominent lymph nodes at level of asiya hepatis  -RUQ U/S 1/21 was unremarkable     4. Anxiety and depression:   -Continue home clonazepam , Abilify, trazodone and escitalopram  and hydroxyzine for anxiety     5. Tobacco abuse:  -Begin NicoDerm patch to reduce cravings declines home use  -Proventil nebulizer 4 times a day as needed at home     6. Hypertension:  -May be secondary to discomfort, poor sleep  -Increase lisinopril to twice daily with parameters, continue amlodipine 10 mg daily    Discharge Exam:  General Appearance: alert and oriented to person, place and time and in no acute distress, obese lying flat without shortness of breath PND, orthopnea and looks much improved.   Skin: warm and dry  Head: normocephalic and atraumatic  Eyes: pupils equal, round, and reactive to light, extraocular eye movements intact, conjunctivae normal  Neck: neck supple and non tender without mass   Pulmonary/Chest: clear to auscultation bilaterally- no wheezes, rales or rhonchi, normal air movement, no respiratory distress  Cardiovascular: normal rate, normal S1 and S2 and no carotid bruits  Abdomen: soft, non-tender, non-distended, obese, normal bowel sounds, no masses or organomegaly  Extremities: no cyanosis, no clubbing and no edema  Neurologic: no cranial nerve deficit and speech normal    Vitals:    09/17/22 1347 09/17/22 1853 09/18/22 0552 09/18/22 0849   BP: (!) 146/87 120/69 (!) 177/98 (!) 161/99   Pulse:  65 66    Resp:  18 20    Temp:  97.9 °F (36.6 °C) 97.5 °F (36.4 °C)    TempSrc:  Oral Infrared    SpO2:  98% 98%    Weight:       Height:           I/O last 3 completed shifts: In: 2286 [P.O.:1540; I.V.:746]  Out: 300 [Urine:300]  I/O this shift:  In: 180 [P.O.:180]  Out: -       LABS:  Recent Labs     09/17/22  0500      K 3.6      CO2 23   BUN 7   CREATININE 0.9   GLUCOSE 149*   CALCIUM 9.4       No results for input(s): WBC, RBC, HGB, HCT, MCV, MCH, MCHC, RDW, PLT, MPV in the last 72 hours. No results for input(s): POCGLU in the last 72 hours. Imaging:  No results found.     Patient Instructions:   Current Discharge Medication List        START taking these medications    Details   amLODIPine (NORVASC) 10 MG tablet Take 1 tablet by mouth daily  Qty: 30 tablet, Refills: 0      !! insulin lispro (HUMALOG) 100 UNIT/ML SOLN injection vial Inject 0-8 Units into the skin 3 times daily (with meals)  Qty: 1 each, Refills: 0      !! insulin lispro (HUMALOG) 100 UNIT/ML SOLN injection vial Inject 0-4 Units into the skin nightly  Qty: 1 each, Refills: 0      lisinopril (PRINIVIL;ZESTRIL) 20 MG tablet Take 1 tablet by mouth 2 times daily  Qty: 60 tablet, Refills: 0      metoclopramide (REGLAN) 10 MG tablet Take 2 tablets by mouth 4 times daily (before meals and nightly)  Qty: 120 tablet, Refills: 0      promethazine (PHENERGAN) 25 MG tablet Take 1 tablet by mouth every 6 hours as needed for Nausea  Qty: 120 tablet, Refills: 0       !! - Potential duplicate medications found. Please discuss with provider. CONTINUE these medications which have NOT CHANGED    Details   albuterol sulfate HFA (VENTOLIN HFA) 108 (90 Base) MCG/ACT inhaler Inhale 2 puffs into the lungs 4 times daily as needed for Wheezing  Qty: 54 g, Refills: 1      insulin glargine (LANTUS SOLOSTAR) 100 UNIT/ML injection pen Inject 10 Units into the skin nightly  Qty: 5 pen, Refills: 0    Associated Diagnoses: Type 2 diabetes mellitus without complication, with long-term current use of insulin ([facility])      capsicum (ZOSTRIX) 0.075 % topical cream Apply topically 3 times daily. Qty: 120 g, Refills: 1      ondansetron (ZOFRAN ODT) 4 MG disintegrating tablet Take 1 tablet by mouth every 8 hours as needed for Nausea  Qty: 10 tablet, Refills: 0      dicyclomine (BENTYL) 10 MG capsule Take 2 capsules by mouth 4 times daily (before meals and nightly)  Qty: 15 capsule, Refills: 0      pantoprazole (PROTONIX) 20 MG tablet Take 1 tablet by mouth daily  Qty: 30 tablet, Refills: 0      brompheniramine-pseudoephedrine-DM 2-30-10 MG/5ML syrup Take 5 mLs by mouth 4 times daily as needed for Cough or Congestion  Qty: 120 mL, Refills: 0      Lancets (ONETOUCH DELICA PLUS [id number]) MISC USE TO TEST FOUR TIMES DAILY AS DIRECTED  Qty: 100 each, Refills: 2    Associated Diagnoses: Type 2 diabetes mellitus without complication, with long-term current use of insulin ([facility])      sharps container 1 each by Does not apply route as needed (needles)  Qty: 1 each, Refills: 3      Insulin Pen Needle (PEN NEEDLES) 32G X 5 MM MISC 1 each by Does not apply route daily  Qty: 100 each, Refills: 3      blood glucose monitor strips Test 4 times a day & as needed for symptoms of irregular blood glucose. Dispense sufficient amount for indicated testing frequency plus additional to accommodate PRN testing needs. E11.9  Qty: 400 strip, Refills: 3    Associated Diagnoses: Type 2 diabetes mellitus without complication, with long-term current use of insulin ([facility])      blood glucose monitor kit and supplies Dispense sufficient amount for indicated testing frequency plus additional to accommodate PRN testing needs. Dispense all needed supplies to include: monitor, strips, lancing device, lancets, control solutions, alcohol swabs. E11.9  Qty: 1 kit, Refills: 0    Comments: Brand per patient preference. May round up to next available package size. Associated Diagnoses: Type 2 diabetes mellitus without complication, with long-term current use of insulin ([facility])      glimepiride (AMARYL) 2 MG tablet Take 1 tablet by mouth every morning (before breakfast)  Qty: 90 tablet, Refills: 0    Associated Diagnoses: Type 2 diabetes mellitus without complication, with long-term current use of insulin ([facility])      traZODone (DESYREL) 150 MG tablet Take 2 tablets by mouth nightly      topiramate (TOPAMAX) 25 MG tablet TAKE 2 TABLETS BY MOUTH AT BEDTIME      gabapentin (NEURONTIN) 100 MG capsule Take 100 mg by mouth in the morning and 100 mg at noon and 100 mg before bedtime. 3 tablets q am.      ARIPiprazole (ABILIFY) 5 MG tablet Take 5 mg by mouth nightly      clonazePAM (KLONOPIN) 0.5 MG tablet TAKE 1 AND 1/2 TABLETS BY MOUTH DAILY AS NEEDED      escitalopram (LEXAPRO) 20 MG tablet TAKE 1 TABLET BY MOUTH DAILY      hydrOXYzine (VISTARIL) 50 MG capsule TAKE 1 CAPSULE BY MOUTH TWICE DAILY AS NEEDED           STOP taking these medications       prochlorperazine (COMPAZINE) 10 MG tablet Comments:   Reason for Stopping:         naproxen (NAPROSYN) 500 MG tablet Comments:   Reason for Stopping:             Note that greater than 30 minutes was spent in preparing discharge papers, discussing discharge with patient, medication review, etc.    NOTE: This report was transcribed using voice recognition software.  Every effort was made to ensure accuracy; however, inadvertent computerized transcription errors may be present.      Signed:  Electronically signed by Samy Sanabria, Ph.D. on 9/18/2022 at @NOW

## 2022-09-18 NOTE — DISCHARGE INSTRUCTIONS
Your information:  Name: Rosalee Corbin  : 1985    Your instructions:    Discharge home. Follow up with primary care provider as directed. Signs and symptoms to look out for:  Call 911 anytime you think you may need emergency care. For example, call if:    You passed out (lost consciousness). Call your doctor now or seek immediate medical care if:    You have symptoms of dehydration, such as:  Dry eyes and a dry mouth. Passing only a little urine. Feeling thirstier than usual.     You have new or worsening belly pain. You have a new or higher fever. You vomit blood or what looks like coffee grounds. Watch closely for changes in your health, and be sure to contact your doctor if:    You have ongoing nausea and vomiting. Your vomiting is getting worse. Your vomiting lasts longer than 2 days. You are not getting better as expected. What to do after you leave the hospital:    Recommended diet: regular diet    Recommended activity: activity as tolerated    The following personal items were collected during your admission and were returned to you:         Information obtained by:  By signing below, I understand that if any problems occur once I leave the hospital I am to contact Dr. Arthur Farmer. I understand and acknowledge receipt of the instructions indicated above.

## 2022-09-19 ENCOUNTER — OFFICE VISIT (OUTPATIENT)
Dept: FAMILY MEDICINE CLINIC | Age: 37
End: 2022-09-19
Payer: MEDICAID

## 2022-09-19 VITALS
BODY MASS INDEX: 44.91 KG/M2 | OXYGEN SATURATION: 95 % | SYSTOLIC BLOOD PRESSURE: 120 MMHG | DIASTOLIC BLOOD PRESSURE: 80 MMHG | HEART RATE: 92 BPM | WEIGHT: 315 LBS | TEMPERATURE: 97 F

## 2022-09-19 DIAGNOSIS — R06.02 SHORTNESS OF BREATH: Primary | ICD-10-CM

## 2022-09-19 DIAGNOSIS — E66.01 CLASS 3 SEVERE OBESITY DUE TO EXCESS CALORIES WITH BODY MASS INDEX (BMI) OF 45.0 TO 49.9 IN ADULT, UNSPECIFIED WHETHER SERIOUS COMORBIDITY PRESENT (HCC): ICD-10-CM

## 2022-09-19 DIAGNOSIS — K31.84 GASTROPARESIS DUE TO DM (HCC): ICD-10-CM

## 2022-09-19 DIAGNOSIS — F39 MOOD DISORDER (HCC): ICD-10-CM

## 2022-09-19 DIAGNOSIS — E11.43 GASTROPARESIS DUE TO DM (HCC): ICD-10-CM

## 2022-09-19 DIAGNOSIS — Z79.4 TYPE 2 DIABETES MELLITUS WITHOUT COMPLICATION, WITH LONG-TERM CURRENT USE OF INSULIN (HCC): ICD-10-CM

## 2022-09-19 DIAGNOSIS — Z72.0 TOBACCO ABUSE: ICD-10-CM

## 2022-09-19 DIAGNOSIS — E11.9 TYPE 2 DIABETES MELLITUS WITHOUT COMPLICATION, WITH LONG-TERM CURRENT USE OF INSULIN (HCC): ICD-10-CM

## 2022-09-19 DIAGNOSIS — I10 ESSENTIAL HYPERTENSION: ICD-10-CM

## 2022-09-19 PROCEDURE — 2022F DILAT RTA XM EVC RTNOPTHY: CPT | Performed by: FAMILY MEDICINE

## 2022-09-19 PROCEDURE — 4004F PT TOBACCO SCREEN RCVD TLK: CPT | Performed by: FAMILY MEDICINE

## 2022-09-19 PROCEDURE — 99214 OFFICE O/P EST MOD 30 MIN: CPT | Performed by: FAMILY MEDICINE

## 2022-09-19 PROCEDURE — 1111F DSCHRG MED/CURRENT MED MERGE: CPT | Performed by: FAMILY MEDICINE

## 2022-09-19 PROCEDURE — G8417 CALC BMI ABV UP PARAM F/U: HCPCS | Performed by: FAMILY MEDICINE

## 2022-09-19 PROCEDURE — G8427 DOCREV CUR MEDS BY ELIG CLIN: HCPCS | Performed by: FAMILY MEDICINE

## 2022-09-19 PROCEDURE — 3052F HG A1C>EQUAL 8.0%<EQUAL 9.0%: CPT | Performed by: FAMILY MEDICINE

## 2022-09-19 RX ORDER — LEMBOREXANT 5 MG/1
TABLET, FILM COATED ORAL
COMMUNITY
Start: 2022-08-26

## 2022-09-19 RX ORDER — LISINOPRIL 40 MG/1
TABLET ORAL
COMMUNITY
Start: 2022-09-18 | End: 2022-09-19

## 2022-09-19 RX ORDER — BLOOD SUGAR DIAGNOSTIC
1 STRIP MISCELLANEOUS DAILY
Qty: 100 EACH | Refills: 3 | Status: SHIPPED | OUTPATIENT
Start: 2022-09-19

## 2022-09-19 RX ORDER — ALBUTEROL SULFATE 90 UG/1
2 AEROSOL, METERED RESPIRATORY (INHALATION) 4 TIMES DAILY PRN
Qty: 54 G | Refills: 1 | Status: SHIPPED
Start: 2022-09-19 | End: 2022-10-24 | Stop reason: SDUPTHER

## 2022-09-19 RX ORDER — ARIPIPRAZOLE 10 MG/1
TABLET ORAL
COMMUNITY
Start: 2022-08-19

## 2022-09-19 RX ORDER — INSULIN GLARGINE 100 [IU]/ML
10 INJECTION, SOLUTION SUBCUTANEOUS NIGHTLY
Qty: 5 ADJUSTABLE DOSE PRE-FILLED PEN SYRINGE | Refills: 0 | Status: SHIPPED | OUTPATIENT
Start: 2022-09-19

## 2022-09-19 ASSESSMENT — ENCOUNTER SYMPTOMS
EYES NEGATIVE: 1
GASTROINTESTINAL NEGATIVE: 1
RESPIRATORY NEGATIVE: 1
ALLERGIC/IMMUNOLOGIC NEGATIVE: 1

## 2022-09-19 NOTE — PROGRESS NOTES
OFFICE PROGRESS NOTE      SUBJECTIVE:        Patient ID:   Saad Kenny is a 40 y.o. male who presents for   Chief Complaint   Patient presents with    Follow-Up from Hospital     Here for recheck following hospital discharge for gastroparesis           HPI:   Patient here for follow-up after the hospital stay  He states he was diagnosed with gastroparesis  Doing better now  He stated does follow the diet  Walks a lot at work  Does follow with a psychiatrist  Continues to smoke  GI issues are better  Shortness of breath is better with the inhaler      Prior to Visit Medications    Medication Sig Taking?  Authorizing Provider   ARIPiprazole (ABILIFY) 10 MG tablet One at HS Yes Historical Provider, MD   amLODIPine (NORVASC) 10 MG tablet Take 1 tablet by mouth daily Yes Latasha Garrett PA-C   insulin lispro (HUMALOG) 100 UNIT/ML SOLN injection vial Inject 0-8 Units into the skin 3 times daily (with meals) Yes Latasha Garrett PA-C   insulin lispro (HUMALOG) 100 UNIT/ML SOLN injection vial Inject 0-4 Units into the skin nightly Yes Latasha Garrett PA-C   metoclopramide (REGLAN) 10 MG tablet Take 2 tablets by mouth 4 times daily (before meals and nightly) Yes Latasha Garrett PA-C   promethazine (PHENERGAN) 25 MG tablet Take 1 tablet by mouth every 6 hours as needed for Nausea Yes Latasha Garrett PA-C   ondansetron (ZOFRAN ODT) 4 MG disintegrating tablet Take 1 tablet by mouth every 8 hours as needed for Nausea Yes Joshua Mendieta,    dicyclomine (BENTYL) 10 MG capsule Take 2 capsules by mouth 4 times daily (before meals and nightly) Yes LIZETH Briggs   albuterol sulfate HFA (VENTOLIN HFA) 108 (90 Base) MCG/ACT inhaler Inhale 2 puffs into the lungs 4 times daily as needed for Wheezing Yes Isaiah Helm MD   Lancets (ONETOUCH DELICA PLUS [id number]) MISC USE TO TEST FOUR TIMES DAILY AS DIRECTED Yes Isaiah Helm MD   insulin glargine (LANTUS SOLOSTAR) 100 UNIT/ML injection pen Inject 10 Units into the skin nightly Yes Nellie Jackson MD   sharps container 1 each by Does not apply route as needed (needles) Yes Nellie Jackson MD   Insulin Pen Needle (PEN NEEDLES) 32G X 5 MM MISC 1 each by Does not apply route daily Yes Nellie Jackson MD   blood glucose monitor strips Test 4 times a day & as needed for symptoms of irregular blood glucose. Dispense sufficient amount for indicated testing frequency plus additional to accommodate PRN testing needs. E11.9 Yes Nellie Jackson MD   blood glucose monitor kit and supplies Dispense sufficient amount for indicated testing frequency plus additional to accommodate PRN testing needs. Dispense all needed supplies to include: monitor, strips, lancing device, lancets, control solutions, alcohol swabs. E11.9 Yes Nellie Jackson MD   glimepiride (AMARYL) 2 MG tablet Take 1 tablet by mouth every morning (before breakfast) Yes Kevin Urias MD   traZODone (DESYREL) 150 MG tablet Take 2 tablets by mouth nightly Yes Historical Provider, MD   topiramate (TOPAMAX) 25 MG tablet TAKE 2 TABLETS BY MOUTH AT BEDTIME Yes Historical Provider, MD   gabapentin (NEURONTIN) 100 MG capsule Take 100 mg by mouth in the morning and 100 mg at noon and 100 mg before bedtime.  3 tablets q am. Yes Historical Provider, MD   clonazePAM (KLONOPIN) 0.5 MG tablet TAKE 1 AND 1/2 TABLETS BY MOUTH DAILY AS NEEDED Yes Historical Provider, MD   escitalopram (LEXAPRO) 20 MG tablet TAKE 1 TABLET BY MOUTH DAILY Yes Historical Provider, MD   hydrOXYzine (VISTARIL) 50 MG capsule TAKE 1 CAPSULE BY MOUTH TWICE DAILY AS NEEDED Yes Historical Provider, MD   DAYVIGO 5 MG TABS One tablet at HS  Historical Provider, MD   lisinopril (PRINIVIL;ZESTRIL) 40 MG tablet One tablet daily  Historical Provider, MD   lisinopril (PRINIVIL;ZESTRIL) 20 MG tablet Take 1 tablet by mouth 2 times daily  Carly Mcdaniel PA-C   pantoprazole (PROTONIX) 20 MG tablet Take 2 tablets by mouth daily Sunil Page MD   capsicum (ZOSTRIX) 0.075 % topical cream Apply topically 3 times daily. Patient not taking: Reported on 9/19/2022  Nicole Rivero DO   prochlorperazine (COMPAZINE) 10 MG tablet Take 1 tablet by mouth every 6 hours as needed (nausea)  Melvin Benjamin DO   brompheniramine-pseudoephedrine-DM 2-30-10 MG/5ML syrup Take 5 mLs by mouth 4 times daily as needed for Cough or Congestion  Patient not taking: Reported on 9/19/2022  Bc Orosco MD   naproxen (NAPROSYN) 500 MG tablet Take 1 tablet by mouth in the morning and 1 tablet before bedtime. Do all this for 10 days. Andrew Loera MD      Social History     Socioeconomic History    Marital status: Single     Spouse name: None    Number of children: None    Years of education: None    Highest education level: None   Tobacco Use    Smoking status: Every Day     Packs/day: 0.50     Years: 15.00     Pack years: 7.50     Types: Cigarettes     Start date: 1997    Smokeless tobacco: Never   Vaping Use    Vaping Use: Never used   Substance and Sexual Activity    Alcohol use: Yes     Alcohol/week: 0.0 standard drinks     Comment: occ    Drug use: No     Comment: medical marijuana    Sexual activity: Yes     Partners: Female     Social Determinants of Health     Financial Resource Strain: Low Risk     Difficulty of Paying Living Expenses: Not hard at all   Food Insecurity: No Food Insecurity    Worried About Running Out of Food in the Last Year: Never true    Ran Out of Food in the Last Year: Never true       I have reviewed Bob's allergies, medications, problem list, medical, social and family history and have updated as needed in the electronic medical record  Review Of Systems:    Review of Systems   Constitutional: Negative. HENT: Negative. Eyes: Negative. Respiratory: Negative. Cardiovascular: Negative. Gastrointestinal: Negative. Endocrine: Negative. Genitourinary: Negative. Musculoskeletal: Negative. COLORU Yellow 09/17/2022 10:20 AM    NITRU Negative 09/17/2022 10:20 AM    GLUCOSEU 500 09/17/2022 10:20 AM    KETUA Negative 09/17/2022 10:20 AM    UROBILINOGEN 0.2 09/17/2022 10:20 AM    BILIRUBINUR Negative 09/17/2022 10:20 AM     No results found for: PSA, CEA, , [id number],       Controlled Substances Monitoring:                                    ASSESSMENT     Patient Active Problem List    Diagnosis Date Noted    Gastroparesis due to DM ([facility] 09/15/2022    Intractable nausea and vomiting 09/14/2022    Intractable cyclical vomiting with nausea 09/14/2022    COVID-19 11/26/2021    Prediabetes 02/25/2021    Nausea and vomiting 02/25/2021    Essential hypertension 01/20/2021    Class 3 severe obesity due to excess calories with body mass index (BMI) of 45.0 to 49.9 in adult ([facility] 01/20/2021    Gastroesophageal reflux disease without esophagitis 10/04/2016    Abdominal pain 09/06/2016    Peptic disease 27/43/2046    Biliary colic 73/88/4118    Acute nasopharyngitis 01/08/2016    Contact dermatitis and eczema due to detergents 01/08/2016    Tobacco abuse 12/07/2015    Midline low back pain without sciatica 12/07/2015    Encounter for long-term (current) use of medications 12/07/2015    Chronic fatigue 12/07/2015    Anxiety 12/07/2015        Diagnosis:     ICD-10-CM    1. Shortness of breath  R06.02 albuterol sulfate HFA (VENTOLIN HFA) 108 (90 Base) MCG/ACT inhaler     CBC with Auto Differential      2. Type 2 diabetes mellitus without complication, with long-term current use of insulin ([facility])  E11.9 insulin glargine (LANTUS SOLOSTAR) 100 UNIT/ML injection pen    Z79.4 Needles & Syringes MISC     CBC with Auto Differential     Comprehensive Metabolic Panel     LIPID PANEL      3. Essential hypertension  I10       4. Mood disorder ([facility]  F39       5. Tobacco abuse  Z72.0       6. Gastroparesis due to DM ([facility])  E11.43 CBC with Auto Differential    K31.84       7.  Class 3 severe obesity due to excess calories

## 2022-09-19 NOTE — PATIENT INSTRUCTIONS
Continue present treatment  Low-sodium low-fat diabetic weight reduction diet  Quit smoking  Lab work before the next visit  Return to clinic earlier if any problems Stable.

## 2022-09-20 LAB — HBA1C MFR BLD: 7.8 % (ref 4–5.6)

## 2022-10-03 DIAGNOSIS — Z79.4 TYPE 2 DIABETES MELLITUS WITHOUT COMPLICATION, WITH LONG-TERM CURRENT USE OF INSULIN (HCC): ICD-10-CM

## 2022-10-03 DIAGNOSIS — E11.9 TYPE 2 DIABETES MELLITUS WITHOUT COMPLICATION, WITH LONG-TERM CURRENT USE OF INSULIN (HCC): ICD-10-CM

## 2022-10-04 RX ORDER — LANCETS 33 GAUGE
EACH MISCELLANEOUS
Qty: 100 EACH | Refills: 2 | Status: SHIPPED | OUTPATIENT
Start: 2022-10-04

## 2022-10-14 RX ORDER — INSULIN LISPRO 100 [IU]/ML
0-8 INJECTION, SOLUTION INTRAVENOUS; SUBCUTANEOUS
Qty: 1 EACH | Refills: 0 | Status: SHIPPED | OUTPATIENT
Start: 2022-10-14

## 2022-10-14 NOTE — TELEPHONE ENCOUNTER
Patient requests refill, he is out today. Last Appointment:  9/19/2022  Future Appointments   Date Time Provider Mima Erica   10/24/2022  9:15 AM MD ANGEL [facility]      . [address] [place] [address]

## 2022-10-24 ENCOUNTER — OFFICE VISIT (OUTPATIENT)
Dept: FAMILY MEDICINE CLINIC | Age: 37
End: 2022-10-24
Payer: MEDICAID

## 2022-10-24 VITALS
WEIGHT: 315 LBS | HEART RATE: 82 BPM | DIASTOLIC BLOOD PRESSURE: 84 MMHG | TEMPERATURE: 97 F | OXYGEN SATURATION: 96 % | BODY MASS INDEX: 44.91 KG/M2 | SYSTOLIC BLOOD PRESSURE: 130 MMHG

## 2022-10-24 DIAGNOSIS — E11.42 DIABETIC POLYNEUROPATHY ASSOCIATED WITH TYPE 2 DIABETES MELLITUS (HCC): ICD-10-CM

## 2022-10-24 DIAGNOSIS — Z72.0 TOBACCO ABUSE: ICD-10-CM

## 2022-10-24 DIAGNOSIS — R06.02 SHORTNESS OF BREATH: ICD-10-CM

## 2022-10-24 DIAGNOSIS — F39 MOOD DISORDER (HCC): ICD-10-CM

## 2022-10-24 DIAGNOSIS — E11.9 TYPE 2 DIABETES MELLITUS WITHOUT COMPLICATION, WITH LONG-TERM CURRENT USE OF INSULIN (HCC): Primary | ICD-10-CM

## 2022-10-24 DIAGNOSIS — I10 ESSENTIAL HYPERTENSION: ICD-10-CM

## 2022-10-24 DIAGNOSIS — Z79.4 TYPE 2 DIABETES MELLITUS WITHOUT COMPLICATION, WITH LONG-TERM CURRENT USE OF INSULIN (HCC): Primary | ICD-10-CM

## 2022-10-24 PROCEDURE — 99214 OFFICE O/P EST MOD 30 MIN: CPT | Performed by: FAMILY MEDICINE

## 2022-10-24 PROCEDURE — G8484 FLU IMMUNIZE NO ADMIN: HCPCS | Performed by: FAMILY MEDICINE

## 2022-10-24 PROCEDURE — 4004F PT TOBACCO SCREEN RCVD TLK: CPT | Performed by: FAMILY MEDICINE

## 2022-10-24 PROCEDURE — 3051F HG A1C>EQUAL 7.0%<8.0%: CPT | Performed by: FAMILY MEDICINE

## 2022-10-24 PROCEDURE — 2022F DILAT RTA XM EVC RTNOPTHY: CPT | Performed by: FAMILY MEDICINE

## 2022-10-24 PROCEDURE — G8417 CALC BMI ABV UP PARAM F/U: HCPCS | Performed by: FAMILY MEDICINE

## 2022-10-24 PROCEDURE — G8427 DOCREV CUR MEDS BY ELIG CLIN: HCPCS | Performed by: FAMILY MEDICINE

## 2022-10-24 RX ORDER — ALBUTEROL SULFATE 90 UG/1
2 AEROSOL, METERED RESPIRATORY (INHALATION) 4 TIMES DAILY PRN
Qty: 3 EACH | Refills: 1 | Status: SHIPPED | OUTPATIENT
Start: 2022-10-24

## 2022-10-24 ASSESSMENT — ENCOUNTER SYMPTOMS
RESPIRATORY NEGATIVE: 1
EYES NEGATIVE: 1
ALLERGIC/IMMUNOLOGIC NEGATIVE: 1
GASTROINTESTINAL NEGATIVE: 1

## 2022-10-24 NOTE — PATIENT INSTRUCTIONS
Continue present treatment  Strict low-sodium low-fat diabetic diet  Regular exercises  Stop smoking  Lab work before the next visit  Return to clinic earlier if any problems  Vitamin B 6 100 mg daily

## 2022-10-24 NOTE — PROGRESS NOTES
OFFICE PROGRESS NOTE      SUBJECTIVE:        Patient ID:   Demetri Schultz is a 40 y.o. male who presents for   Chief Complaint   Patient presents with    Hypertension     Here for recheck on Hypertension and DM. Patient reports feeling well. Currently glucose is 178. HPI:   Patient here for the follow-up  Complaining of tingling of the legs  Taking the medication as prescribed  Does not follow the diet and exercise  Still continues to smoke        Prior to Visit Medications    Medication Sig Taking? Authorizing Provider   insulin lispro (HUMALOG) 100 UNIT/ML SOLN injection vial Inject 0-8 Units into the skin 3 times daily (with meals) Yes Fred Johnson MD   Lancets (ONETOUCH DELICA PLUS [id number]) MISC USE TO TEST FOUR TIMES DAILY AS DIRECTED Yes Rupesh Christianson MD   DAYVIGO 5 MG TABS One tablet at HS Yes Historical Provider, MD   ARIPiprazole (ABILIFY) 10 MG tablet One at HS Yes Historical Provider, MD   albuterol sulfate HFA (VENTOLIN HFA) 108 (90 Base) MCG/ACT inhaler Inhale 2 puffs into the lungs 4 times daily as needed for Wheezing Yes Rupesh Christianson MD   Insulin Pen Needle (PEN NEEDLES) 32G X 5 MM MISC 1 each by Does not apply route daily Yes Rupesh Christianson MD   Needles & Syringes MISC 3 each by Does not apply route daily Use one 3 times a day with sliding scale of insulin Yes Rupesh Christianson MD   amLODIPine (NORVASC) 10 MG tablet Take 1 tablet by mouth daily Yes Phil Rodriguez PA-C   sharps container 1 each by Does not apply route as needed (needles) Yes Rupesh Christianson MD   blood glucose monitor strips Test 4 times a day & as needed for symptoms of irregular blood glucose. Dispense sufficient amount for indicated testing frequency plus additional to accommodate PRN testing needs. E11.9 Yes Rupesh Christianson MD   blood glucose monitor kit and supplies Dispense sufficient amount for indicated testing frequency plus additional to accommodate PRN testing needs. Dispense all needed supplies to include: monitor, strips, lancing device, lancets, control solutions, alcohol swabs. E11.9 Yes Macrina Vega MD   glimepiride (AMARYL) 2 MG tablet Take 1 tablet by mouth every morning (before breakfast) Yes Taco Zamora MD   traZODone (DESYREL) 150 MG tablet Take 2 tablets by mouth nightly Yes Historical Provider, MD   topiramate (TOPAMAX) 25 MG tablet TAKE 2 TABLETS BY MOUTH AT BEDTIME Yes Historical Provider, MD   gabapentin (NEURONTIN) 100 MG capsule Take 100 mg by mouth in the morning and 100 mg at noon and 100 mg before bedtime.  3 tablets q am. Yes Historical Provider, MD   clonazePAM (KLONOPIN) 0.5 MG tablet TAKE 1 AND 1/2 TABLETS BY MOUTH DAILY AS NEEDED Yes Historical Provider, MD   escitalopram (LEXAPRO) 20 MG tablet TAKE 1 TABLET BY MOUTH DAILY Yes Historical Provider, MD   hydrOXYzine (VISTARIL) 50 MG capsule TAKE 1 CAPSULE BY MOUTH TWICE DAILY AS NEEDED Yes Historical Provider, MD   insulin glargine (LANTUS SOLOSTAR) 100 UNIT/ML injection pen Inject 10 Units into the skin nightly  Patient not taking: Reported on 10/24/2022  Macrina Vega MD   lisinopril (PRINIVIL;ZESTRIL) 20 MG tablet Take 1 tablet by mouth 2 times daily  Jeane Ramirez PA-C   metoclopramide (REGLAN) 10 MG tablet Take 2 tablets by mouth 4 times daily (before meals and nightly)  Jeane Ramirez PA-C   pantoprazole (PROTONIX) 20 MG tablet Take 2 tablets by mouth daily  Patient not taking: Reported on 10/24/2022  Ashley Duque MD   ondansetron (ZOFRAN ODT) 4 MG disintegrating tablet Take 1 tablet by mouth every 8 hours as needed for Nausea  Patient not taking: Reported on 10/24/2022  Radha Peter,    prochlorperazine (COMPAZINE) 10 MG tablet Take 1 tablet by mouth every 6 hours as needed (nausea)  Aron Benjamin, DO   dicyclomine (BENTYL) 10 MG capsule Take 2 capsules by mouth 4 times daily (before meals and nightly)  LIZETH Elmore brompheniramine-pseudoephedrine-DM 2-30-10 MG/5ML syrup Take 5 mLs by mouth 4 times daily as needed for Cough or Congestion  Patient not taking: No sig reported  Bc Oshea MD   naproxen (NAPROSYN) 500 MG tablet Take 1 tablet by mouth in the morning and 1 tablet before bedtime. Do all this for 10 days. Lolis Mata MD      Social History     Socioeconomic History    Marital status: Single     Spouse name: None    Number of children: None    Years of education: None    Highest education level: None   Tobacco Use    Smoking status: Every Day     Packs/day: 0.50     Years: 15.00     Pack years: 7.50     Types: Cigarettes     Start date: 1997    Smokeless tobacco: Never   Vaping Use    Vaping Use: Never used   Substance and Sexual Activity    Alcohol use: Yes     Alcohol/week: 0.0 standard drinks     Comment: occ    Drug use: No     Comment: medical marijuana    Sexual activity: Yes     Partners: Female     Social Determinants of Health     Financial Resource Strain: Low Risk     Difficulty of Paying Living Expenses: Not hard at all   Food Insecurity: No Food Insecurity    Worried About Running Out of Food in the Last Year: Never true    Ran Out of Food in the Last Year: Never true       I have reviewed Bob's allergies, medications, problem list, medical, social and family history and have updated as needed in the electronic medical record  Review Of Systems:    Review of Systems   Constitutional: Negative. HENT: Negative. Eyes: Negative. Respiratory: Negative. Cardiovascular: Negative. Gastrointestinal: Negative. Endocrine: Negative. Genitourinary: Negative. Musculoskeletal: Negative. Allergic/Immunologic: Negative. Neurological:  Positive for numbness (Tingling sensation of the legs). Hematological: Negative. Psychiatric/Behavioral: Negative.               OBJECTIVE:     VS:  Wt Readings from Last 3 Encounters:   10/24/22 (!) 322 lb (146.1 kg)   09/19/22 (!) 322 lb (146.1 kg)   09/15/22 (!) 340 lb (154.2 kg)     Temp Readings from Last 3 Encounters:   10/24/22 97 °F (36.1 °C) (Infrared)   09/19/22 97 °F (36.1 °C)   09/18/22 97.5 °F (36.4 °C) (Infrared)     BP Readings from Last 3 Encounters:   10/24/22 130/84   09/19/22 120/80   09/18/22 (!) 161/99        Physical Exam  Constitutional:       Appearance: He is well-developed. HENT:      Head: Normocephalic and atraumatic. Eyes:      Conjunctiva/sclera: Conjunctivae normal.      Pupils: Pupils are equal, round, and reactive to light. Cardiovascular:      Rate and Rhythm: Normal rate and regular rhythm. Heart sounds: Normal heart sounds. Pulmonary:      Effort: Pulmonary effort is normal.      Breath sounds: Normal breath sounds. Abdominal:      General: Bowel sounds are normal.      Palpations: Abdomen is soft. Musculoskeletal:         General: Normal range of motion. Cervical back: Normal range of motion and neck supple. Skin:     General: Skin is warm and dry. Neurological:      Mental Status: He is alert and oriented to person, place, and time.    Psychiatric:         Behavior: Behavior normal.          Labs :    Lab Results   Component Value Date    WBC 9.5 09/15/2022    HGB 15.3 09/15/2022    HCT 47.9 09/15/2022     09/15/2022    CHOL 157 04/11/2022    TRIG 154 (H) 04/11/2022    HDL 27 04/11/2022    ALT 76 (H) 09/17/2022    AST 37 09/17/2022     09/17/2022    K 3.6 09/17/2022     09/17/2022    CREATININE 0.9 09/17/2022    BUN 7 09/17/2022    CO2 23 09/17/2022    TSH 1.810 02/19/2022    INR 1.0 11/25/2021    GLUF 316 (H) 02/19/2022    LABA1C 7.8 (H) 09/17/2022    LABMICR 79.5 (H) 02/19/2022     Lab Results   Component Value Date/Time    COLORU Yellow 09/17/2022 10:20 AM    NITRU Negative 09/17/2022 10:20 AM    GLUCOSEU 500 09/17/2022 10:20 AM    KETUA Negative 09/17/2022 10:20 AM    UROBILINOGEN 0.2 09/17/2022 10:20 AM    BILIRUBINUR Negative 09/17/2022 10:20 AM     No results found for: PSA, CEA, , [id number],       Controlled Substances Monitoring:                                    ASSESSMENT     Patient Active Problem List    Diagnosis Date Noted    Gastroparesis due to DM ([facility] 09/15/2022    Intractable nausea and vomiting 09/14/2022    Intractable cyclical vomiting with nausea 09/14/2022    COVID-19 11/26/2021    Prediabetes 02/25/2021    Nausea and vomiting 02/25/2021    Essential hypertension 01/20/2021    Class 3 severe obesity due to excess calories with body mass index (BMI) of 45.0 to 49.9 in adult ([facility] 01/20/2021    Gastroesophageal reflux disease without esophagitis 10/04/2016    Abdominal pain 09/06/2016    Peptic disease 96/97/7613    Biliary colic 58/45/7120    Acute nasopharyngitis 01/08/2016    Contact dermatitis and eczema due to detergents 01/08/2016    Tobacco abuse 12/07/2015    Midline low back pain without sciatica 12/07/2015    Encounter for long-term (current) use of medications 12/07/2015    Chronic fatigue 12/07/2015    Anxiety 12/07/2015        Diagnosis:     ICD-10-CM    1. Type 2 diabetes mellitus without complication, with long-term current use of insulin (HCC)  E11.9 CBC with Auto Differential    Z79.4 Comprehensive Metabolic Panel     LIPID PANEL      2. Shortness of breath  R06.02 albuterol sulfate HFA (VENTOLIN HFA) 108 (90 Base) MCG/ACT inhaler     CBC with Auto Differential      3. Essential hypertension  I10 Comprehensive Metabolic Panel      4. Mood disorder ([facility]  F39       5. Tobacco abuse  Z72.0       6.  Diabetic polyneuropathy associated with type 2 diabetes mellitus (HCC)  E11.42 CBC with Auto Differential          PLAN:   Continue present treatment  Strict low-sodium low-fat diabetic weight reduction diet  Stop smoking  Vitamin B 6 100 mg daily  Lab work before the next visit  Return to clinic earlier if any problems  Follow-up with the consultants        Patient Instructions   Continue present treatment  Strict low-sodium low-fat diabetic diet  Regular exercises  Stop smoking  Lab work before the next visit  Return to clinic earlier if any problems  Vitamin B 6 100 mg daily    Return in about 4 weeks (around 11/21/2022) for Medication Check, test results, diabetes check. Cedric Clark reviewed my findings and recommendations with Mahendra Chavez.     Electronicallysigned by Jacqueline Campbell MD on 10/24/22 at 10:10 AM EDT

## 2022-11-09 RX ORDER — INSULIN LISPRO 100 [IU]/ML
0-8 INJECTION, SOLUTION INTRAVENOUS; SUBCUTANEOUS
Qty: 3 EACH | Refills: 1 | Status: SHIPPED | OUTPATIENT
Start: 2022-11-09

## 2023-01-05 ENCOUNTER — HOSPITAL ENCOUNTER (EMERGENCY)
Age: 38
Discharge: HOME | End: 2023-01-05
Attending: EMERGENCY MEDICINE
Payer: MEDICAID

## 2023-01-05 VITALS
TEMPERATURE: 97 F | WEIGHT: 300 LBS | SYSTOLIC BLOOD PRESSURE: 146 MMHG | DIASTOLIC BLOOD PRESSURE: 84 MMHG | RESPIRATION RATE: 16 BRPM | BODY MASS INDEX: 40.63 KG/M2 | OXYGEN SATURATION: 95 % | HEART RATE: 68 BPM | HEIGHT: 72 IN

## 2023-01-05 DIAGNOSIS — J20.9 ACUTE BRONCHITIS, UNSPECIFIED ORGANISM: Primary | ICD-10-CM

## 2023-01-05 PROCEDURE — 99283 EMERGENCY DEPT VISIT LOW MDM: CPT

## 2023-01-05 RX ORDER — DOXYCYCLINE HYCLATE 100 MG
100 TABLET ORAL 2 TIMES DAILY
Qty: 20 TABLET | Refills: 0 | Status: SHIPPED | OUTPATIENT
Start: 2023-01-05 | End: 2023-01-15

## 2023-01-05 RX ORDER — DEXTROMETHORPHAN HYDROBROMIDE AND PROMETHAZINE HYDROCHLORIDE 15; 6.25 MG/5ML; MG/5ML
5 SYRUP ORAL 4 TIMES DAILY PRN
Qty: 120 ML | Refills: 0 | Status: SHIPPED | OUTPATIENT
Start: 2023-01-05 | End: 2023-01-12

## 2023-01-05 ASSESSMENT — ENCOUNTER SYMPTOMS
SORE THROAT: 0
BACK PAIN: 0
EYE REDNESS: 0
EYE PAIN: 0
ABDOMINAL PAIN: 0
SINUS PRESSURE: 0
NAUSEA: 0
COUGH: 1
DIARRHEA: 0
EYE DISCHARGE: 0
RHINORRHEA: 1
WHEEZING: 1
SHORTNESS OF BREATH: 0
VOMITING: 0

## 2023-01-05 ASSESSMENT — PAIN SCALES - GENERAL: PAINLEVEL_OUTOF10: 5

## 2023-01-05 ASSESSMENT — PAIN DESCRIPTION - FREQUENCY: FREQUENCY: CONTINUOUS

## 2023-01-05 ASSESSMENT — PAIN DESCRIPTION - DESCRIPTORS: DESCRIPTORS: ACHING

## 2023-01-05 ASSESSMENT — PAIN DESCRIPTION - PAIN TYPE: TYPE: ACUTE PAIN

## 2023-01-05 ASSESSMENT — PAIN DESCRIPTION - LOCATION: LOCATION: GENERALIZED

## 2023-01-05 ASSESSMENT — PAIN - FUNCTIONAL ASSESSMENT: PAIN_FUNCTIONAL_ASSESSMENT: 0-10

## 2023-02-03 ENCOUNTER — HOSPITAL ENCOUNTER (EMERGENCY)
Age: 38
Discharge: HOME | End: 2023-02-03
Attending: EMERGENCY MEDICINE
Payer: MEDICAID

## 2023-02-03 VITALS
OXYGEN SATURATION: 98 % | WEIGHT: 300 LBS | RESPIRATION RATE: 16 BRPM | DIASTOLIC BLOOD PRESSURE: 99 MMHG | TEMPERATURE: 97.6 F | HEIGHT: 71 IN | HEART RATE: 87 BPM | SYSTOLIC BLOOD PRESSURE: 147 MMHG | BODY MASS INDEX: 42 KG/M2

## 2023-02-03 DIAGNOSIS — B34.9 VIRAL SYNDROME: Primary | ICD-10-CM

## 2023-02-03 LAB — SARS-COV-2, NAAT: NOT DETECTED

## 2023-02-03 PROCEDURE — 6370000000 HC RX 637 (ALT 250 FOR IP): Performed by: EMERGENCY MEDICINE

## 2023-02-03 PROCEDURE — 99283 EMERGENCY DEPT VISIT LOW MDM: CPT

## 2023-02-03 PROCEDURE — 87635 SARS-COV-2 COVID-19 AMP PRB: CPT

## 2023-02-03 RX ORDER — ONDANSETRON 4 MG/1
4 TABLET, FILM COATED ORAL EVERY 4 HOURS
Qty: 10 TABLET | Refills: 0 | Status: SHIPPED | OUTPATIENT
Start: 2023-02-03

## 2023-02-03 RX ORDER — ONDANSETRON 4 MG/1
4 TABLET, ORALLY DISINTEGRATING ORAL ONCE
Status: COMPLETED | OUTPATIENT
Start: 2023-02-03 | End: 2023-02-03

## 2023-02-03 RX ORDER — BROMPHENIRAMINE MALEATE, PSEUDOEPHEDRINE HYDROCHLORIDE, AND DEXTROMETHORPHAN HYDROBROMIDE 2; 30; 10 MG/5ML; MG/5ML; MG/5ML
5 SYRUP ORAL 4 TIMES DAILY PRN
Qty: 120 ML | Refills: 0 | Status: SHIPPED | OUTPATIENT
Start: 2023-02-03

## 2023-02-03 RX ADMIN — ONDANSETRON 4 MG: 4 TABLET, ORALLY DISINTEGRATING ORAL at 09:56

## 2023-02-03 ASSESSMENT — ENCOUNTER SYMPTOMS
SHORTNESS OF BREATH: 0
DIARRHEA: 0
EYE REDNESS: 0
SINUS PRESSURE: 0
EYE PAIN: 0
NAUSEA: 1
BACK PAIN: 0
SORE THROAT: 0
WHEEZING: 0
VOMITING: 1
ABDOMINAL PAIN: 0
EYE DISCHARGE: 0
COUGH: 0

## 2023-02-03 NOTE — Clinical Note
Urbano Mckoy was seen and treated in our emergency department on 2/3/2023. He may return to work on 02/05/2023. If you have any questions or concerns, please don't hesitate to call.       Andrew Zambrano MD

## 2023-02-03 NOTE — ED PROVIDER NOTES
The history is provided by the patient. Illness   The current episode started yesterday. The onset was sudden. The problem is moderate. Associated symptoms include nausea, vomiting, congestion and muscle aches. Pertinent negatives include no fever, no abdominal pain, no diarrhea, no ear pain, no headaches, no sore throat, no cough, no wheezing, no rash, no eye discharge, no eye pain and no eye redness. Review of Systems   Constitutional:  Negative for chills and fever. HENT:  Positive for congestion. Negative for ear pain, sinus pressure and sore throat. Eyes:  Negative for pain, discharge and redness. Respiratory:  Negative for cough, shortness of breath and wheezing. Cardiovascular:  Negative for chest pain. Gastrointestinal:  Positive for nausea and vomiting. Negative for abdominal pain and diarrhea. Genitourinary:  Negative for dysuria and frequency. Musculoskeletal:  Negative for arthralgias and back pain. Skin:  Negative for rash and wound. Neurological:  Negative for weakness and headaches. Hematological:  Negative for adenopathy. All other systems reviewed and are negative. Physical Exam  Vitals and nursing note reviewed. Constitutional:       Appearance: He is well-developed. HENT:      Head: Normocephalic and atraumatic. Jaw: No trismus. Right Ear: Hearing and external ear normal. Tympanic membrane is retracted. Left Ear: Hearing and external ear normal. Tympanic membrane is retracted. Nose: Congestion present. Right Sinus: No maxillary sinus tenderness or frontal sinus tenderness. Left Sinus: No maxillary sinus tenderness or frontal sinus tenderness. Mouth/Throat:      Pharynx: Uvula midline. No uvula swelling. Eyes:      General: Lids are normal.      Conjunctiva/sclera: Conjunctivae normal.      Pupils: Pupils are equal, round, and reactive to light. Cardiovascular:      Rate and Rhythm: Normal rate and regular rhythm. Heart sounds: Normal heart sounds. No murmur heard. Pulmonary:      Effort: Pulmonary effort is normal. No respiratory distress. Breath sounds: Normal breath sounds. No wheezing or rales. Abdominal:      General: Bowel sounds are normal.      Palpations: Abdomen is soft. Abdomen is not rigid. Tenderness: There is no abdominal tenderness. There is no guarding or rebound. Musculoskeletal:      Cervical back: Normal range of motion and neck supple. Skin:     General: Skin is warm and dry. Findings: No abrasion or rash. Neurological:      Mental Status: He is alert and oriented to person, place, and time. GCS: GCS eye subscore is 4. GCS verbal subscore is 5. GCS motor subscore is 6. Cranial Nerves: No cranial nerve deficit. Sensory: No sensory deficit. Coordination: Coordination normal.      Gait: Gait normal.        Procedures     MDM         --------------------------------------------- PAST HISTORY ---------------------------------------------  Past Medical History:  has a past medical history of Allergic rhinitis, Chronic back pain, Depression, and Diabetes mellitus ([facility] Past Surgical History:  has a past surgical history that includes Abscess Drainage. Social History:  reports that he has been smoking cigarettes. He started smoking about 26 years ago. He has a 7.50 pack-year smoking history. He has never used smokeless tobacco. He reports current alcohol use. He reports that he does not use drugs. Family History: family history is not on file. The patients home medications have been reviewed. Allergies: Patient has no known allergies.     -------------------------------------------------- RESULTS -------------------------------------------------  Labs:  Results for orders placed or performed during the hospital encounter of 02/03/23   COVID-19, Rapid    Specimen: Nasopharyngeal Swab   Result Value Ref Range    SARS-CoV-2, NAAT Not Detected Not Detected Radiology:  No orders to display       ------------------------- NURSING NOTES AND VITALS REVIEWED ---------------------------  Date / Time Roomed:  2/3/2023  9:29 AM  ED Bed Assignment:  01/01    The nursing notes within the ED encounter and vital signs as below have been reviewed. BP (!) 147/99   Pulse 87   Temp 97.6 °F (36.4 °C) (Temporal)   Resp 16   Ht 5' 11\" (1.803 m)   Wt 300 lb (136.1 kg)   SpO2 98%   BMI 41.84 kg/m²   Oxygen Saturation Interpretation: Normal      ------------------------------------------ PROGRESS NOTES ------------------------------------------  I have spoken with the patient and discussed todays results, in addition to providing specific details for the plan of care and counseling regarding the diagnosis and prognosis. Their questions are answered at this time and they are agreeable with the plan. I discussed at length with them reasons for immediate return here for re evaluation. They will followup with primary care by calling their office tomorrow. Medications   ondansetron (ZOFRAN-ODT) disintegrating tablet 4 mg (4 mg Oral Given 2/3/23 0956)         --------------------------------- ADDITIONAL PROVIDER NOTES ---------------------------------  At this time the patient is without objective evidence of an acute process requiring hospitalization or inpatient management. They have remained hemodynamically stable throughout their entire ED visit and are stable for discharge with outpatient follow-up. The plan has been discussed in detail and they are aware of the specific conditions for emergent return, as well as the importance of follow-up. New Prescriptions    BROMPHENIRAMINE-PSEUDOEPHEDRINE-DM 2-30-10 MG/5ML SYRUP    Take 5 mLs by mouth 4 times daily as needed for Congestion or Cough    ONDANSETRON (ZOFRAN) 4 MG TABLET    Take 1 tablet by mouth every 4 hours       Diagnosis:  1.  Viral syndrome        Disposition:  Patient's disposition: Discharge to home  Patient's condition is stable.                      Jose Bourgeois MD  02/03/23 101

## 2023-03-12 ENCOUNTER — HOSPITAL ENCOUNTER (EMERGENCY)
Age: 38
Discharge: HOME | End: 2023-03-12
Attending: STUDENT IN AN ORGANIZED HEALTH CARE EDUCATION/TRAINING PROGRAM
Payer: MEDICAID

## 2023-03-12 VITALS
OXYGEN SATURATION: 99 % | RESPIRATION RATE: 14 BRPM | DIASTOLIC BLOOD PRESSURE: 82 MMHG | TEMPERATURE: 97.9 F | HEART RATE: 62 BPM | SYSTOLIC BLOOD PRESSURE: 132 MMHG

## 2023-03-12 DIAGNOSIS — J02.9 ACUTE PHARYNGITIS, UNSPECIFIED ETIOLOGY: Primary | ICD-10-CM

## 2023-03-12 LAB — STREP GRP A PCR: NEGATIVE

## 2023-03-12 PROCEDURE — 87880 STREP A ASSAY W/OPTIC: CPT

## 2023-03-12 PROCEDURE — 99283 EMERGENCY DEPT VISIT LOW MDM: CPT

## 2023-03-12 RX ORDER — FLUTICASONE PROPIONATE 50 MCG
2 SPRAY, SUSPENSION (ML) NASAL DAILY
Qty: 16 G | Refills: 0 | Status: SHIPPED | OUTPATIENT
Start: 2023-03-12

## 2023-03-12 RX ORDER — IBUPROFEN 600 MG/1
600 TABLET ORAL EVERY 6 HOURS PRN
Qty: 120 TABLET | Refills: 0 | Status: SHIPPED | OUTPATIENT
Start: 2023-03-12

## 2023-03-12 ASSESSMENT — PAIN - FUNCTIONAL ASSESSMENT: PAIN_FUNCTIONAL_ASSESSMENT: 0-10

## 2023-03-12 ASSESSMENT — ENCOUNTER SYMPTOMS
VOMITING: 0
SORE THROAT: 1
DIARRHEA: 0
COUGH: 1

## 2023-03-12 ASSESSMENT — PAIN DESCRIPTION - DESCRIPTORS: DESCRIPTORS: SHARP

## 2023-03-12 ASSESSMENT — PAIN SCALES - GENERAL: PAINLEVEL_OUTOF10: 8

## 2023-03-12 ASSESSMENT — PAIN DESCRIPTION - LOCATION: LOCATION: THROAT

## 2023-03-12 NOTE — ED PROVIDER NOTES
HPI  40 y.o. male presenting for sore throat, nasal congestion. Symptoms have been present for a few days. He has had mild dry cough. No known fevers. No nausea, emesis, diarrhea, or other complaints at this time. --------------------------------------------- PAST HISTORY ---------------------------------------------  Past Medical History:  has a past medical history of Allergic rhinitis, Chronic back pain, Depression, and Diabetes mellitus ([facility] Past Surgical History:  has a past surgical history that includes Abscess Drainage. Social History:  reports that he has been smoking cigarettes. He started smoking about 26 years ago. He has a 7.50 pack-year smoking history. He has never used smokeless tobacco. He reports current alcohol use. He reports that he does not use drugs. Family History: family history is not on file. The patients home medications have been reviewed. Allergies: Patient has no known allergies. Review of Systems   Constitutional:  Negative for appetite change and fever. HENT:  Positive for congestion and sore throat. Respiratory:  Positive for cough. Gastrointestinal:  Negative for diarrhea and vomiting. Neurological:  Negative for headaches. Physical Exam  Constitutional:       General: He is not in acute distress. HENT:      Mouth/Throat:      Mouth: Mucous membranes are moist.      Comments: Erythema posterior oropharynx, 1-2+ tonsillar swelling bilaterally, postnasal drip appreciated, uvula midline  Eyes:      Conjunctiva/sclera: Conjunctivae normal.   Cardiovascular:      Rate and Rhythm: Normal rate and regular rhythm. Pulmonary:      Effort: Pulmonary effort is normal.      Breath sounds: Normal breath sounds. Skin:     General: Skin is warm and dry. Neurological:      General: No focal deficit present. Mental Status: He is alert.    Psychiatric:         Mood and Affect: Mood normal.         Behavior: Behavior normal.        Procedures -------------------------------------------------- RESULTS -------------------------------------------------  Labs:  Results for orders placed or performed during the hospital encounter of 03/12/23   Strep screen group a throat    Specimen: Throat   Result Value Ref Range    Strep Grp A PCR Negative Negative       Radiology:  No orders to display       ------------------------- NURSING NOTES AND VITALS REVIEWED ---------------------------  Date / Time Roomed:  3/12/2023 11:07 AM  ED Bed Assignment:  03/03    The nursing notes within the ED encounter and vital signs as below have been reviewed. /82   Pulse 62   Temp 97.9 °F (36.6 °C) (Temporal)   Resp 14   SpO2 99%   Oxygen Saturation Interpretation: Normal    Medical Decision Making  80-year-old male presenting for sore throat, nasal congestion x3 days. Patient did have erythema of posterior oropharynx, but rapid strep negative. COVID and influenza testing were offered, but patient declined. Patient is afebrile, overall well-appearing, felt stable for discharge. He was prescribed Magic mouthwash, ibuprofen, and Flonase. Patient agreeable to discharge with outpatient follow-up. He was counseled on other supportive measures, including popsicles, warm salt water gargles, etc.  He was discharged home in stable condition with instructions follow-up with PCP. Amount and/or Complexity of Data Reviewed  Labs: ordered. Details: rapid strep negative    Risk  Prescription drug management.        ------------------------------------------ PROGRESS NOTES ------------------------------------------  I have spoken with the patient and discussed todays results, in addition to providing specific details for the plan of care and counseling regarding the diagnosis and prognosis. Their questions are answered at this time and they are agreeable with the plan. I discussed at length with them reasons for immediate return here for re evaluation.  They will followup with PCP.      --------------------------------- ADDITIONAL PROVIDER NOTES ---------------------------------  At this time the patient is without objective evidence of an acute process requiring hospitalization or inpatient management. They have remained hemodynamically stable throughout their entire ED visit and are stable for discharge with outpatient follow-up. The plan has been discussed in detail and they are aware of the specific conditions for emergent return, as well as the importance of follow-up. Discharge Medication List as of 3/12/2023 11:38 AM        START taking these medications    Details   ibuprofen (IBU) 600 MG tablet Take 1 tablet by mouth every 6 hours as needed for Pain, Disp-120 tablet, R-0Normal      fluticasone (FLONASE) 50 MCG/ACT nasal spray 2 sprays by Each Nostril route daily, Disp-16 g, R-0Normal      Magic Mouthwash (MIRACLE MOUTHWASH) Take 5 mLs by mouth 4 times daily as needed for Irritation, Disp-240 mL, R-0Normal             Diagnosis:  1. Acute pharyngitis, unspecified etiology        Disposition:  Patient's disposition: Discharge to home  Patient's condition is stable.           Britney Kapadia MD  Resident  03/12/23 0237

## 2023-03-22 RX ORDER — UBIQUINOL 100 MG
CAPSULE ORAL
OUTPATIENT
Start: 2023-03-22

## 2023-03-23 ENCOUNTER — HOSPITAL ENCOUNTER (EMERGENCY)
Age: 38
Discharge: HOME | End: 2023-03-23
Attending: EMERGENCY MEDICINE
Payer: MEDICAID

## 2023-03-23 VITALS
TEMPERATURE: 98.2 F | RESPIRATION RATE: 16 BRPM | OXYGEN SATURATION: 99 % | WEIGHT: 300 LBS | SYSTOLIC BLOOD PRESSURE: 136 MMHG | HEIGHT: 71 IN | HEART RATE: 87 BPM | DIASTOLIC BLOOD PRESSURE: 84 MMHG | BODY MASS INDEX: 42 KG/M2

## 2023-03-23 DIAGNOSIS — R11.2 NAUSEA AND VOMITING, UNSPECIFIED VOMITING TYPE: Primary | ICD-10-CM

## 2023-03-23 PROCEDURE — 99283 EMERGENCY DEPT VISIT LOW MDM: CPT

## 2023-03-23 RX ORDER — ONDANSETRON 4 MG/1
4 TABLET, FILM COATED ORAL EVERY 4 HOURS
Qty: 8 TABLET | Refills: 0 | Status: SHIPPED | OUTPATIENT
Start: 2023-03-23

## 2023-03-23 ASSESSMENT — ENCOUNTER SYMPTOMS
WHEEZING: 0
SHORTNESS OF BREATH: 0
ABDOMINAL PAIN: 0
VOMITING: 1
EYE REDNESS: 0
BACK PAIN: 0
COUGH: 0
EYE DISCHARGE: 0
SORE THROAT: 0
NAUSEA: 1
DIARRHEA: 0
EYE PAIN: 0
SINUS PRESSURE: 0

## 2023-03-23 NOTE — ED PROVIDER NOTES
Requesting work excuse    The history is provided by the patient. Illness   The current episode started today. The onset was sudden. Associated symptoms include nausea and vomiting. Pertinent negatives include no fever, no abdominal pain, no diarrhea, no ear pain, no headaches, no sore throat, no cough, no wheezing, no rash, no eye discharge, no eye pain and no eye redness. Review of Systems   Constitutional:  Negative for chills and fever. HENT:  Negative for ear pain, sinus pressure and sore throat. Eyes:  Negative for pain, discharge and redness. Respiratory:  Negative for cough, shortness of breath and wheezing. Cardiovascular:  Negative for chest pain. Gastrointestinal:  Positive for nausea and vomiting. Negative for abdominal pain and diarrhea. Genitourinary:  Negative for dysuria and frequency. Musculoskeletal:  Negative for arthralgias and back pain. Skin:  Negative for rash and wound. Neurological:  Negative for weakness and headaches. Hematological:  Negative for adenopathy. All other systems reviewed and are negative. Physical Exam  Vitals and nursing note reviewed. Constitutional:       Appearance: He is well-developed. HENT:      Head: Normocephalic and atraumatic. Jaw: No trismus. Right Ear: Hearing and external ear normal.      Left Ear: Hearing and external ear normal.      Nose: Nose normal.      Right Sinus: No maxillary sinus tenderness or frontal sinus tenderness. Left Sinus: No maxillary sinus tenderness or frontal sinus tenderness. Mouth/Throat:      Pharynx: Uvula midline. No uvula swelling. Eyes:      General: Lids are normal.      Conjunctiva/sclera: Conjunctivae normal.      Pupils: Pupils are equal, round, and reactive to light. Cardiovascular:      Rate and Rhythm: Normal rate and regular rhythm. Heart sounds: Normal heart sounds. No murmur heard.   Pulmonary:      Effort: Pulmonary effort is normal. No respiratory signs as below have been reviewed. /84   Pulse 87   Temp 98.2 °F (36.8 °C) (Temporal)   Resp 16   Ht 5' 11\" (1.803 m)   Wt 300 lb (136.1 kg)   SpO2 99%   BMI 41.84 kg/m²   Oxygen Saturation Interpretation: Normal      ------------------------------------------ PROGRESS NOTES ------------------------------------------  I have spoken with the patient and discussed todays results, in addition to providing specific details for the plan of care and counseling regarding the diagnosis and prognosis. Their questions are answered at this time and they are agreeable with the plan. I discussed at length with them reasons for immediate return here for re evaluation. They will followup with primary care by calling their office tomorrow. Medications - No data to display      --------------------------------- ADDITIONAL PROVIDER NOTES ---------------------------------  At this time the patient is without objective evidence of an acute process requiring hospitalization or inpatient management. They have remained hemodynamically stable throughout their entire ED visit and are stable for discharge with outpatient follow-up. The plan has been discussed in detail and they are aware of the specific conditions for emergent return, as well as the importance of follow-up. New Prescriptions    ONDANSETRON (ZOFRAN) 4 MG TABLET    Take 1 tablet by mouth every 4 hours       Diagnosis:  1. Nausea and vomiting, unspecified vomiting type        Disposition:  Patient's disposition: Discharge to home  Patient's condition is stable.                     Arnel Hull MD  03/23/23 4034

## 2023-03-23 NOTE — Clinical Note
Josselyn Bowie was seen and treated in our emergency department on 3/23/2023. He may return to work on 03/24/2023. If you have any questions or concerns, please don't hesitate to call.       Hayden Partida MD

## 2023-04-06 ENCOUNTER — HOSPITAL ENCOUNTER (EMERGENCY)
Age: 38
Discharge: HOME | End: 2023-04-06
Attending: FAMILY MEDICINE
Payer: MEDICAID

## 2023-04-06 VITALS
TEMPERATURE: 97.1 F | OXYGEN SATURATION: 99 % | RESPIRATION RATE: 16 BRPM | SYSTOLIC BLOOD PRESSURE: 136 MMHG | DIASTOLIC BLOOD PRESSURE: 92 MMHG | HEART RATE: 80 BPM

## 2023-04-06 DIAGNOSIS — R11.2 NAUSEA AND VOMITING, UNSPECIFIED VOMITING TYPE: Primary | ICD-10-CM

## 2023-04-06 PROCEDURE — 6370000000 HC RX 637 (ALT 250 FOR IP): Performed by: FAMILY MEDICINE

## 2023-04-06 RX ORDER — ONDANSETRON 4 MG/1
4 TABLET, ORALLY DISINTEGRATING ORAL ONCE
Status: COMPLETED | OUTPATIENT
Start: 2023-04-06 | End: 2023-04-06

## 2023-04-06 RX ORDER — ONDANSETRON 4 MG/1
4 TABLET, FILM COATED ORAL EVERY 4 HOURS
Qty: 5 TABLET | Refills: 0 | Status: SHIPPED | OUTPATIENT
Start: 2023-04-06

## 2023-04-06 RX ADMIN — ONDANSETRON 4 MG: 4 TABLET, ORALLY DISINTEGRATING ORAL at 16:31

## 2023-04-06 ASSESSMENT — PAIN - FUNCTIONAL ASSESSMENT: PAIN_FUNCTIONAL_ASSESSMENT: NONE - DENIES PAIN

## 2023-04-06 NOTE — Clinical Note
Robin Ndiaye was seen and treated in our emergency department on 4/6/2023. He may return to work on 04/07/2023. If you have any questions or concerns, please don't hesitate to call.       Darryle Ivans, MD

## 2023-04-06 NOTE — ED PROVIDER NOTES
EXAM--------------------------------------    Constitutional/General: Alert and oriented x3, well appearing, non toxic in NAD  Head: NC/AT  Eyes: PERRL, EOMI  Mouth: Oropharynx clear, handling secretions, no trismus  Neck: Supple, full ROM, no meningeal signs  Pulmonary: Lungs clear to auscultation bilaterally, no wheezes, rales, or rhonchi. Not in respiratory distress  Cardiovascular:  Regular rate and rhythm, no murmurs, gallops, or rubs. 2+ distal pulses  Abdomen: Soft, non tender, non distended,   Extremities: Moves all extremities x 4. Warm and well perfused  Skin: warm and dry without rash  Neurologic: GCS 15,  Psych: Normal Affect      ------------------------------ ED COURSE/MEDICAL DECISION MAKING----------------------  Medications   ondansetron (ZOFRAN-ODT) disintegrating tablet 4 mg (4 mg Oral Given 4/6/23 5579)         Medical Decision Making:    Simple    Counseling: The emergency provider has spoken with the patient and discussed todays results, in addition to providing specific details for the plan of care and counseling regarding the diagnosis and prognosis. Questions are answered at this time and they are agreeable with the plan.      --------------------------------- IMPRESSION AND DISPOSITION ---------------------------------    IMPRESSION  1.  Nausea and vomiting, unspecified vomiting type        DISPOSITION  Disposition: Discharge to home  Patient condition is stable                 Kp Herrera MD  04/06/23 8323

## 2023-07-19 DIAGNOSIS — R06.02 SHORTNESS OF BREATH: ICD-10-CM

## 2023-07-19 RX ORDER — ALBUTEROL SULFATE 90 UG/1
AEROSOL, METERED RESPIRATORY (INHALATION)
Qty: 54 G | OUTPATIENT
Start: 2023-07-19

## 2023-07-26 ENCOUNTER — HOSPITAL ENCOUNTER (EMERGENCY)
Age: 38
Discharge: HOME | End: 2023-07-26
Attending: EMERGENCY MEDICINE
Payer: MEDICAID

## 2023-07-26 VITALS
BODY MASS INDEX: 44.35 KG/M2 | TEMPERATURE: 98 F | WEIGHT: 315 LBS | SYSTOLIC BLOOD PRESSURE: 134 MMHG | OXYGEN SATURATION: 97 % | DIASTOLIC BLOOD PRESSURE: 77 MMHG | RESPIRATION RATE: 18 BRPM | HEART RATE: 86 BPM

## 2023-07-26 DIAGNOSIS — R73.9 HYPERGLYCEMIA: Primary | ICD-10-CM

## 2023-07-26 LAB
ANION GAP SERPL CALCULATED.3IONS-SCNC: 9 MMOL/L (ref 7–16)
BASOPHILS # BLD: 0.04 K/UL (ref 0–0.2)
BASOPHILS NFR BLD: 0 % (ref 0–2)
BILIRUB UR QL STRIP: NEGATIVE
BUN SERPL-MCNC: 15 MG/DL (ref 6–20)
CALCIUM SERPL-MCNC: 9.8 MG/DL (ref 8.6–10.2)
CHLORIDE SERPL-SCNC: 101 MMOL/L (ref 98–107)
CLARITY UR: CLEAR
CO2 SERPL-SCNC: 26 MMOL/L (ref 22–29)
COLOR UR: YELLOW
COMMENT: ABNORMAL
CREAT SERPL-MCNC: 0.8 MG/DL (ref 0.7–1.2)
EOSINOPHIL # BLD: 0.37 K/UL (ref 0.05–0.5)
EOSINOPHILS RELATIVE PERCENT: 4 % (ref 0–6)
ERYTHROCYTE [DISTWIDTH] IN BLOOD BY AUTOMATED COUNT: 12.7 % (ref 11.5–15)
GFR SERPL CREATININE-BSD FRML MDRD: >60 ML/MIN/1.73M2
GLUCOSE BLD-MCNC: 233 MG/DL
GLUCOSE SERPL-MCNC: 328 MG/DL (ref 74–107)
GLUCOSE UR STRIP-MCNC: >=1000 MG/DL
HCT VFR BLD AUTO: 49.5 % (ref 37–54)
HGB BLD-MCNC: 16.2 G/DL (ref 12.5–16.5)
HGB UR QL STRIP.AUTO: NEGATIVE
IMM GRANULOCYTES # BLD AUTO: 0.06 K/UL (ref 0–0.58)
IMM GRANULOCYTES NFR BLD: 1 % (ref 0–5)
KETONES UR STRIP-MCNC: NEGATIVE MG/DL
LEUKOCYTE ESTERASE UR QL STRIP: NEGATIVE
LYMPHOCYTES NFR BLD: 3.71 K/UL (ref 1.5–4)
LYMPHOCYTES RELATIVE PERCENT: 41 % (ref 20–42)
MAGNESIUM SERPL-MCNC: 2.2 MG/DL (ref 1.6–2.6)
MCH RBC QN AUTO: 28.3 PG (ref 26–35)
MCHC RBC AUTO-ENTMCNC: 32.7 G/DL (ref 32–34.5)
MCV RBC AUTO: 86.4 FL (ref 80–99.9)
METER GLUCOSE: 233 MG/DL (ref 74–99)
METER GLUCOSE: 335 MG/DL (ref 74–99)
MONOCYTES NFR BLD: 0.7 K/UL (ref 0.1–0.95)
MONOCYTES NFR BLD: 8 % (ref 2–12)
NEUTROPHILS NFR BLD: 46 % (ref 43–80)
NEUTS SEG NFR BLD: 4.11 K/UL (ref 1.8–7.3)
NITRITE UR QL STRIP: NEGATIVE
PH UR STRIP: 5 [PH] (ref 5–9)
PH VENOUS: 7.39 (ref 7.35–7.45)
PLATELET # BLD AUTO: 266 K/UL (ref 130–450)
PMV BLD AUTO: 9.3 FL (ref 7–12)
POTASSIUM SERPL-SCNC: 4.3 MMOL/L (ref 3.5–5)
PROT UR STRIP-MCNC: NEGATIVE MG/DL
RBC # BLD AUTO: 5.73 M/UL (ref 3.8–5.8)
SODIUM SERPL-SCNC: 136 MMOL/L (ref 132–146)
SP GR UR STRIP: 1.02 (ref 1–1.03)
UROBILINOGEN UR STRIP-ACNC: 0.2 EU/DL (ref 0–1)
WBC OTHER # BLD: 9 K/UL (ref 4.5–11.5)

## 2023-07-26 PROCEDURE — 82800 BLOOD PH: CPT

## 2023-07-26 PROCEDURE — 82010 KETONE BODYS QUAN: CPT

## 2023-07-26 PROCEDURE — 6370000000 HC RX 637 (ALT 250 FOR IP): Performed by: EMERGENCY MEDICINE

## 2023-07-26 PROCEDURE — 83735 ASSAY OF MAGNESIUM: CPT

## 2023-07-26 PROCEDURE — 85027 COMPLETE CBC AUTOMATED: CPT

## 2023-07-26 PROCEDURE — 99284 EMERGENCY DEPT VISIT MOD MDM: CPT

## 2023-07-26 PROCEDURE — 2580000003 HC RX 258: Performed by: EMERGENCY MEDICINE

## 2023-07-26 PROCEDURE — 81003 URINALYSIS AUTO W/O SCOPE: CPT

## 2023-07-26 PROCEDURE — 80048 BASIC METABOLIC PNL TOTAL CA: CPT

## 2023-07-26 PROCEDURE — 82947 ASSAY GLUCOSE BLOOD QUANT: CPT

## 2023-07-26 PROCEDURE — 96374 THER/PROPH/DIAG INJ IV PUSH: CPT

## 2023-07-26 RX ORDER — 0.9 % SODIUM CHLORIDE 0.9 %
1000 INTRAVENOUS SOLUTION INTRAVENOUS ONCE
Status: COMPLETED | OUTPATIENT
Start: 2023-07-26 | End: 2023-07-26

## 2023-07-26 RX ADMIN — INSULIN HUMAN 3 UNITS: 100 INJECTION, SOLUTION PARENTERAL at 20:31

## 2023-07-26 RX ADMIN — SODIUM CHLORIDE 1000 ML: 9 INJECTION, SOLUTION INTRAVENOUS at 20:35

## 2023-07-26 ASSESSMENT — LIFESTYLE VARIABLES: HOW MANY STANDARD DRINKS CONTAINING ALCOHOL DO YOU HAVE ON A TYPICAL DAY: PATIENT DOES NOT DRINK

## 2023-07-26 ASSESSMENT — ENCOUNTER SYMPTOMS
WHEEZING: 0
CHEST TIGHTNESS: 0
DIARRHEA: 0
BACK PAIN: 0
ABDOMINAL PAIN: 0
COUGH: 0
NAUSEA: 0
VOMITING: 0
SHORTNESS OF BREATH: 0
SORE THROAT: 0

## 2023-07-26 ASSESSMENT — PAIN - FUNCTIONAL ASSESSMENT: PAIN_FUNCTIONAL_ASSESSMENT: NONE - DENIES PAIN

## 2023-07-27 LAB — B-OH-BUTYR SERPL-MCNC: 0.09 MMOL/L (ref 0.02–0.27)

## 2023-09-10 NOTE — ED TRIAGE NOTES
FIRST PROVIDER CONTACT ASSESSMENT NOTE      Department of Emergency Medicine   Admit Date: No admission date for patient encounter. Chief Complaint: Shortness of Breath (increased SOB started yesterday) and Positive For Covid-19 (dx 3 days. sx started friday)      History of Present Illness:    Zoltan Lemon is a 29 y.o. male who presents to the ED for SOB and Chest Pain. Sick x 1 week, Covid + test 3 days ago.   Chills  Cough and SOB- exertion and rest  CP- feels like someone stomping on my chest    Wife at home covid too        -----------------END OF FIRST PROVIDER CONTACT ASSESSMENT NOTE--------------  Electronically signed by LIZETH Salazar   DD: 11/25/21
BIBems from home for abdominal pain starting tonight 0200. Pt states he had "dark red vomit" x1. Denies fevers. Pt awake, alert, and appropriate and interactive. clear b/s b/l, no incr WOB. PMHx asthma.

## 2023-09-11 ENCOUNTER — HOSPITAL ENCOUNTER (EMERGENCY)
Age: 38
Discharge: HOME | End: 2023-09-11
Attending: EMERGENCY MEDICINE
Payer: MEDICAID

## 2023-09-11 VITALS
SYSTOLIC BLOOD PRESSURE: 130 MMHG | TEMPERATURE: 98.2 F | DIASTOLIC BLOOD PRESSURE: 94 MMHG | RESPIRATION RATE: 16 BRPM | HEART RATE: 78 BPM | OXYGEN SATURATION: 98 %

## 2023-09-11 DIAGNOSIS — J06.9 VIRAL URI: Primary | ICD-10-CM

## 2023-09-11 LAB
SARS-COV-2 RDRP RESP QL NAA+PROBE: NOT DETECTED
SPECIMEN DESCRIPTION: NORMAL
SPECIMEN SOURCE: NORMAL
STREP A, MOLECULAR: NEGATIVE

## 2023-09-11 PROCEDURE — 99283 EMERGENCY DEPT VISIT LOW MDM: CPT

## 2023-09-11 PROCEDURE — 87635 SARS-COV-2 COVID-19 AMP PRB: CPT

## 2023-09-11 RX ORDER — BROMPHENIRAMINE MALEATE, PSEUDOEPHEDRINE HYDROCHLORIDE, AND DEXTROMETHORPHAN HYDROBROMIDE 2; 30; 10 MG/5ML; MG/5ML; MG/5ML
5 SYRUP ORAL 4 TIMES DAILY PRN
Qty: 120 ML | Refills: 0 | Status: SHIPPED | OUTPATIENT
Start: 2023-09-11

## 2023-09-11 ASSESSMENT — ENCOUNTER SYMPTOMS
EYE DISCHARGE: 0
COUGH: 1
BACK PAIN: 0
NAUSEA: 0
ABDOMINAL PAIN: 0
SINUS PRESSURE: 0
WHEEZING: 0
DIARRHEA: 0
SORE THROAT: 1
SHORTNESS OF BREATH: 0
RHINORRHEA: 1
EYE PAIN: 0
VOMITING: 0
EYE REDNESS: 0

## 2023-09-11 ASSESSMENT — PAIN - FUNCTIONAL ASSESSMENT: PAIN_FUNCTIONAL_ASSESSMENT: NONE - DENIES PAIN

## 2023-09-11 NOTE — ED PROVIDER NOTES
The history is provided by the patient. URI  Presenting symptoms: congestion, cough, fatigue, rhinorrhea and sore throat    Presenting symptoms: no ear pain, no facial pain and no fever    Severity:  Moderate  Onset quality:  Sudden  Duration:  1 day  Chronicity:  New  Associated symptoms: no arthralgias, no headaches and no wheezing         Review of Systems   Constitutional:  Positive for fatigue. Negative for chills and fever. HENT:  Positive for congestion, rhinorrhea and sore throat. Negative for ear pain and sinus pressure. Eyes:  Negative for pain, discharge and redness. Respiratory:  Positive for cough. Negative for shortness of breath and wheezing. Cardiovascular:  Negative for chest pain. Gastrointestinal:  Negative for abdominal pain, diarrhea, nausea and vomiting. Genitourinary:  Negative for dysuria and frequency. Musculoskeletal:  Negative for arthralgias and back pain. Skin:  Negative for rash and wound. Neurological:  Negative for weakness and headaches. Hematological:  Negative for adenopathy. All other systems reviewed and are negative. Physical Exam  Vitals and nursing note reviewed. Constitutional:       Appearance: He is well-developed. HENT:      Head: Normocephalic and atraumatic. Jaw: No trismus. Right Ear: Hearing and external ear normal. Tympanic membrane is retracted. Left Ear: Hearing and external ear normal. Tympanic membrane is retracted. Nose: Mucosal edema and congestion present. Right Sinus: No maxillary sinus tenderness or frontal sinus tenderness. Left Sinus: No maxillary sinus tenderness or frontal sinus tenderness. Mouth/Throat:      Pharynx: Uvula midline. No uvula swelling. Eyes:      General: Lids are normal.      Conjunctiva/sclera: Conjunctivae normal.      Pupils: Pupils are equal, round, and reactive to light. Cardiovascular:      Rate and Rhythm: Normal rate and regular rhythm.       Heart sounds:

## 2023-09-19 ENCOUNTER — HOSPITAL ENCOUNTER (EMERGENCY)
Age: 38
Discharge: HOME | End: 2023-09-19
Attending: STUDENT IN AN ORGANIZED HEALTH CARE EDUCATION/TRAINING PROGRAM
Payer: MEDICAID

## 2023-09-19 VITALS
TEMPERATURE: 98.1 F | BODY MASS INDEX: 43.4 KG/M2 | WEIGHT: 310 LBS | OXYGEN SATURATION: 98 % | HEIGHT: 71 IN | HEART RATE: 95 BPM | RESPIRATION RATE: 16 BRPM | DIASTOLIC BLOOD PRESSURE: 96 MMHG | SYSTOLIC BLOOD PRESSURE: 143 MMHG

## 2023-09-19 DIAGNOSIS — J01.90 ACUTE SINUSITIS, RECURRENCE NOT SPECIFIED, UNSPECIFIED LOCATION: Primary | ICD-10-CM

## 2023-09-19 PROCEDURE — 99283 EMERGENCY DEPT VISIT LOW MDM: CPT

## 2023-09-19 RX ORDER — FLUTICASONE PROPIONATE 50 MCG
2 SPRAY, SUSPENSION (ML) NASAL DAILY
Qty: 16 G | Refills: 0 | Status: SHIPPED | OUTPATIENT
Start: 2023-09-19

## 2023-09-19 RX ORDER — AMOXICILLIN AND CLAVULANATE POTASSIUM 875; 125 MG/1; MG/1
1 TABLET, FILM COATED ORAL 2 TIMES DAILY
Qty: 20 TABLET | Refills: 0 | Status: SHIPPED | OUTPATIENT
Start: 2023-09-19 | End: 2023-09-29

## 2023-09-19 RX ORDER — PREDNISONE 20 MG/1
40 TABLET ORAL DAILY
Qty: 10 TABLET | Refills: 0 | Status: SHIPPED | OUTPATIENT
Start: 2023-09-19 | End: 2023-09-24

## 2023-09-19 ASSESSMENT — ENCOUNTER SYMPTOMS
NAUSEA: 0
EYE PAIN: 0
SORE THROAT: 0
EYE REDNESS: 0
WHEEZING: 1
SINUS PRESSURE: 1
BACK PAIN: 0
DIARRHEA: 0
COUGH: 0
ABDOMINAL PAIN: 0
SHORTNESS OF BREATH: 0
EYE DISCHARGE: 0
VOMITING: 0

## 2023-09-19 NOTE — ED PROVIDER NOTES
HPI   50-year-old male patient sending to emergency department for evaluation of cough, congestion. He states he is having facial pressure, nasal drainage. No fevers. He feels like his symptoms have been worsening. He was seen here a week ago, diagnosed with viral URI. Had negative COVID and strep swab at that time. He denies any chest pain or shortness of breath. He does note that he uses his albuterol inhaler and does have some mild associated wheezing. Review of Systems   Constitutional:  Negative for chills and fever. HENT:  Positive for congestion and sinus pressure. Negative for ear pain and sore throat. Eyes:  Negative for pain, discharge and redness. Respiratory:  Positive for wheezing. Negative for cough and shortness of breath. Cardiovascular:  Negative for chest pain. Gastrointestinal:  Negative for abdominal pain, diarrhea, nausea and vomiting. Genitourinary:  Negative for dysuria and frequency. Musculoskeletal:  Negative for arthralgias and back pain. Skin:  Negative for rash and wound. Neurological:  Negative for weakness and headaches. Hematological:  Negative for adenopathy. All other systems reviewed and are negative. Physical Exam  Vitals and nursing note reviewed. Constitutional:       Appearance: He is well-developed. HENT:      Head: Normocephalic and atraumatic. Nose: Congestion present. Comments: Frontal bilateral sinus tenderness to palpation. Mouth/Throat:      Pharynx: Oropharynx is clear. No oropharyngeal exudate or posterior oropharyngeal erythema. Eyes:      Conjunctiva/sclera: Conjunctivae normal.   Cardiovascular:      Rate and Rhythm: Normal rate and regular rhythm. Heart sounds: Normal heart sounds. No murmur heard. Pulmonary:      Comments: Mild expiratory wheezing on exam.  No respiratory distress. Speaking clear full sentences. Abdominal:      General: Bowel sounds are normal.      Palpations: Abdomen is soft.

## 2023-09-25 ENCOUNTER — TELEPHONE (OUTPATIENT)
Dept: ADMINISTRATIVE | Age: 38
End: 2023-09-25

## 2023-09-25 NOTE — TELEPHONE ENCOUNTER
MA spoke with patient to scheduled ED follow up, patient declined to schedule because he has no way of getting to [place]. Patient requesting to be seen at [facility] location. Routing encounter to [facility].     Electronically signed by Nathalie Romero on 9/25/23 at 11:38 AM EDT

## 2023-09-25 NOTE — TELEPHONE ENCOUNTER
Patient seen at ED on 9/19/23. Stated he was advised he had fluid in left ear. Patient stated he is struggling to hear from left ear now.  Please advise for scheduling

## 2023-10-06 ENCOUNTER — OFFICE VISIT (OUTPATIENT)
Dept: ENT CLINIC | Age: 38
End: 2023-10-06
Payer: MEDICAID

## 2023-10-06 VITALS
WEIGHT: 305.2 LBS | SYSTOLIC BLOOD PRESSURE: 129 MMHG | HEART RATE: 80 BPM | DIASTOLIC BLOOD PRESSURE: 95 MMHG | BODY MASS INDEX: 42.73 KG/M2 | HEIGHT: 71 IN

## 2023-10-06 DIAGNOSIS — H69.93 DYSFUNCTION OF BOTH EUSTACHIAN TUBES: Primary | ICD-10-CM

## 2023-10-06 PROCEDURE — G8427 DOCREV CUR MEDS BY ELIG CLIN: HCPCS | Performed by: OTOLARYNGOLOGY

## 2023-10-06 PROCEDURE — 4004F PT TOBACCO SCREEN RCVD TLK: CPT | Performed by: OTOLARYNGOLOGY

## 2023-10-06 PROCEDURE — G8484 FLU IMMUNIZE NO ADMIN: HCPCS | Performed by: OTOLARYNGOLOGY

## 2023-10-06 PROCEDURE — 3078F DIAST BP <80 MM HG: CPT | Performed by: OTOLARYNGOLOGY

## 2023-10-06 PROCEDURE — 3074F SYST BP LT 130 MM HG: CPT | Performed by: OTOLARYNGOLOGY

## 2023-10-06 PROCEDURE — G8417 CALC BMI ABV UP PARAM F/U: HCPCS | Performed by: OTOLARYNGOLOGY

## 2023-10-06 PROCEDURE — 99204 OFFICE O/P NEW MOD 45 MIN: CPT | Performed by: OTOLARYNGOLOGY

## 2023-10-06 RX ORDER — METHYLPREDNISOLONE 4 MG/1
TABLET ORAL
Qty: 1 KIT | Refills: 0 | Status: SHIPPED | OUTPATIENT
Start: 2023-10-06 | End: 2023-10-12

## 2023-10-06 RX ORDER — FLUTICASONE PROPIONATE 50 MCG
2 SPRAY, SUSPENSION (ML) NASAL DAILY
Qty: 1 EACH | Refills: 3 | Status: SHIPPED | OUTPATIENT
Start: 2023-10-06

## 2023-10-06 ASSESSMENT — ENCOUNTER SYMPTOMS
TROUBLE SWALLOWING: 0
VOICE CHANGE: 0
VOMITING: 0
RHINORRHEA: 1
SHORTNESS OF BREATH: 0
COUGH: 0
SORE THROAT: 0

## 2023-10-06 NOTE — PROGRESS NOTES
[facility] Otolaryngology  Dr. Ankit Rao D.O. Ms.Ed. New Consult       Patient Name:  Willy Marie  :  1985     CHIEF C/O:    Chief Complaint   Patient presents with    Congestion     Pt states for about a month he has had a lot of congestion. States he cant really herar out of his left ear, feels like there is fluid in there. Pt states every time he gets sick he has issues with his left ear and fluid in it        HISTORY OBTAINED FROM:  patient    HISTORY OF PRESENT ILLNESS:       Figueroa Hoffmann is a 41y.o. year old male, here today for:       Here for ED follow up for sinus issues and fluid in the ears. Was placed on abx and steroids and Flonase. Patient is a current daily smoker. Patient has a know history of ear issues.  Hx of ear tubes last tubes about 10 yerars ago          Past Medical History:   Diagnosis Date    Allergic rhinitis     Chronic back pain     Depression     Diabetes mellitus ([address])      Past Surgical History:   Procedure Laterality Date    ABSCESS DRAINAGE         Current Outpatient Medications:     fluticasone (FLONASE) 50 MCG/ACT nasal spray, 2 sprays by Each Nostril route daily, Disp: 16 g, Rfl: 0    brompheniramine-pseudoephedrine-DM 2-30-10 MG/5ML syrup, Take 5 mLs by mouth 4 times daily as needed for Congestion or Cough, Disp: 120 mL, Rfl: 0    ondansetron (ZOFRAN) 4 MG tablet, Take 1 tablet by mouth every 4 hours, Disp: 5 tablet, Rfl: 0    insulin lispro (HUMALOG) 100 UNIT/ML SOLN injection vial, Inject 0-8 Units into the skin 3 times daily (with meals), Disp: 3 each, Rfl: 1    albuterol sulfate HFA (VENTOLIN HFA) 108 (90 Base) MCG/ACT inhaler, Inhale 2 puffs into the lungs 4 times daily as needed for Wheezing, Disp: 3 each, Rfl: 1    Lancets (ONETOUCH DELICA PLUS [id number]) MISC, USE TO TEST FOUR TIMES DAILY AS DIRECTED, Disp: 100 each, Rfl: 2    DAYVIGO 5 MG TABS, One tablet at HS, Disp: , Rfl:     Insulin Pen Needle (PEN NEEDLES) 32G X 5 MM MISC, 1 each by Does not apply route

## 2023-10-14 DIAGNOSIS — Z79.4 TYPE 2 DIABETES MELLITUS WITHOUT COMPLICATION, WITH LONG-TERM CURRENT USE OF INSULIN (HCC): ICD-10-CM

## 2023-10-14 DIAGNOSIS — E11.9 TYPE 2 DIABETES MELLITUS WITHOUT COMPLICATION, WITH LONG-TERM CURRENT USE OF INSULIN (HCC): ICD-10-CM

## 2023-10-15 DIAGNOSIS — E11.9 TYPE 2 DIABETES MELLITUS WITHOUT COMPLICATION, WITH LONG-TERM CURRENT USE OF INSULIN (HCC): ICD-10-CM

## 2023-10-15 DIAGNOSIS — Z79.4 TYPE 2 DIABETES MELLITUS WITHOUT COMPLICATION, WITH LONG-TERM CURRENT USE OF INSULIN (HCC): ICD-10-CM

## 2023-10-16 RX ORDER — INSULIN GLARGINE 100 [IU]/ML
INJECTION, SOLUTION SUBCUTANEOUS
Qty: 15 ML | OUTPATIENT
Start: 2023-10-16

## 2023-11-14 ENCOUNTER — HOSPITAL ENCOUNTER (EMERGENCY)
Age: 38
Discharge: HOME | End: 2023-11-14
Attending: FAMILY MEDICINE
Payer: COMMERCIAL

## 2023-11-14 VITALS
OXYGEN SATURATION: 99 % | SYSTOLIC BLOOD PRESSURE: 141 MMHG | WEIGHT: 300 LBS | TEMPERATURE: 97.9 F | HEIGHT: 71 IN | BODY MASS INDEX: 42 KG/M2 | HEART RATE: 75 BPM | RESPIRATION RATE: 16 BRPM | DIASTOLIC BLOOD PRESSURE: 89 MMHG

## 2023-11-14 DIAGNOSIS — J06.9 ACUTE UPPER RESPIRATORY INFECTION: ICD-10-CM

## 2023-11-14 DIAGNOSIS — R11.0 NAUSEA: Primary | ICD-10-CM

## 2023-11-14 PROCEDURE — 6370000000 HC RX 637 (ALT 250 FOR IP): Performed by: FAMILY MEDICINE

## 2023-11-14 PROCEDURE — 99283 EMERGENCY DEPT VISIT LOW MDM: CPT

## 2023-11-14 RX ORDER — ONDANSETRON 4 MG/1
4 TABLET, ORALLY DISINTEGRATING ORAL 3 TIMES DAILY PRN
Qty: 5 TABLET | Refills: 0 | Status: SHIPPED | OUTPATIENT
Start: 2023-11-14

## 2023-11-14 RX ORDER — ONDANSETRON 4 MG/1
4 TABLET, ORALLY DISINTEGRATING ORAL ONCE
Status: COMPLETED | OUTPATIENT
Start: 2023-11-14 | End: 2023-11-14

## 2023-11-14 RX ORDER — DEXTROMETHORPHAN HYDROBROMIDE AND PROMETHAZINE HYDROCHLORIDE 15; 6.25 MG/5ML; MG/5ML
5 SYRUP ORAL 4 TIMES DAILY PRN
Qty: 118 ML | Refills: 0 | Status: SHIPPED | OUTPATIENT
Start: 2023-11-14 | End: 2023-11-21

## 2023-11-14 RX ADMIN — ONDANSETRON 4 MG: 4 TABLET, ORALLY DISINTEGRATING ORAL at 10:44

## 2023-11-14 ASSESSMENT — PAIN - FUNCTIONAL ASSESSMENT: PAIN_FUNCTIONAL_ASSESSMENT: NONE - DENIES PAIN

## 2023-11-21 ENCOUNTER — PROCEDURE VISIT (OUTPATIENT)
Dept: AUDIOLOGY | Age: 38
End: 2023-11-21
Payer: COMMERCIAL

## 2023-11-21 ENCOUNTER — OFFICE VISIT (OUTPATIENT)
Dept: ENT CLINIC | Age: 38
End: 2023-11-21
Payer: COMMERCIAL

## 2023-11-21 VITALS
WEIGHT: 305 LBS | SYSTOLIC BLOOD PRESSURE: 139 MMHG | BODY MASS INDEX: 42.7 KG/M2 | DIASTOLIC BLOOD PRESSURE: 92 MMHG | HEART RATE: 67 BPM | HEIGHT: 71 IN

## 2023-11-21 DIAGNOSIS — H69.93 DYSFUNCTION OF BOTH EUSTACHIAN TUBES: Primary | ICD-10-CM

## 2023-11-21 PROCEDURE — 92567 TYMPANOMETRY: CPT | Performed by: AUDIOLOGIST

## 2023-11-21 PROCEDURE — 99213 OFFICE O/P EST LOW 20 MIN: CPT | Performed by: OTOLARYNGOLOGY

## 2023-11-21 PROCEDURE — 3078F DIAST BP <80 MM HG: CPT | Performed by: OTOLARYNGOLOGY

## 2023-11-21 PROCEDURE — 3074F SYST BP LT 130 MM HG: CPT | Performed by: OTOLARYNGOLOGY

## 2023-11-21 ASSESSMENT — ENCOUNTER SYMPTOMS
VOICE CHANGE: 0
RHINORRHEA: 1
SORE THROAT: 0
COUGH: 0
VOMITING: 0
TROUBLE SWALLOWING: 0
SHORTNESS OF BREATH: 0

## 2023-11-21 NOTE — PROGRESS NOTES
This patient was referred for tympanometric testing by Dr. Lona Aldana due to eustachian tube dysfunction. Tympanometry revealed negative middle ear pressure (-225 daPa), in the left ear. The right ear revealed a normal tympanogram.     The results were reviewed with the patient. Recommendations for follow up will be made pending physician consult.     Electronically signed by Belem Becker on 11/21/2023 at 10:03 AM

## 2023-11-21 NOTE — PROGRESS NOTES
[facility] Otolaryngology  Dr. Alina Rao D.O. Ms.Ed. New Consult       Patient Name:  Prudence Kern  :  1985     CHIEF C/O:    Chief Complaint   Patient presents with    Follow-up     6 wk ETD w/TYMP       HISTORY OBTAINED FROM:  patient    HISTORY OF PRESENT ILLNESS:       Newman Goltz is a 41y.o. year old male, here today for:       Here for ED follow up for sinus issues and fluid in the ears. Mainly his left ear. He states that there is pressure and decreased hearing out of that ear. Was placed on abx and steroids and Flonase, which he states did not help. Patient is a current daily smoker smoking 1/2 pack per day. Patient has a know history of ear issues. Hx of ear tubes last tubes about 10 yerars ago.  He also has nasal congestion on the left side        Past Medical History:   Diagnosis Date    Allergic rhinitis     Chronic back pain     Depression     Diabetes mellitus ([address])      Past Surgical History:   Procedure Laterality Date    ABSCESS DRAINAGE         Current Outpatient Medications:     ondansetron (ZOFRAN-ODT) 4 MG disintegrating tablet, Take 1 tablet by mouth 3 times daily as needed for Nausea or Vomiting, Disp: 5 tablet, Rfl: 0    promethazine-dextromethorphan (PROMETHAZINE-DM) 6.25-15 MG/5ML syrup, Take 5 mLs by mouth 4 times daily as needed for Cough, Disp: 118 mL, Rfl: 0    fluticasone (FLONASE) 50 MCG/ACT nasal spray, 2 sprays by Nasal route daily 2 sprays each nostril once daily, Disp: 1 each, Rfl: 3    fluticasone (FLONASE) 50 MCG/ACT nasal spray, 2 sprays by Each Nostril route daily, Disp: 16 g, Rfl: 0    ondansetron (ZOFRAN) 4 MG tablet, Take 1 tablet by mouth every 4 hours, Disp: 5 tablet, Rfl: 0    insulin lispro (HUMALOG) 100 UNIT/ML SOLN injection vial, Inject 0-8 Units into the skin 3 times daily (with meals), Disp: 3 each, Rfl: 1    albuterol sulfate HFA (VENTOLIN HFA) 108 (90 Base) MCG/ACT inhaler, Inhale 2 puffs into the lungs 4 times daily as needed for Wheezing, Disp: 3 each,

## 2023-11-21 NOTE — PATIENT INSTRUCTIONS
Due to the volume of surgeries at our practice, please allow the surgery scheduler up to 2 weeks to call you to schedule surgery. If it has not been 14 business days after your office visit where surgery was discussed, please wait the appropriate time frame prior to calling office. SURGERY:_____/_____/_____    Nothing to eat or drink after midnight the night before surgery unless surgery is at [facility] or otherwise instructed by the hospital.    DO NOT TAKE ANY ASPIRIN PRODUCTS 7 days prior to surgery. Tylenol only. No Advil, Motrin, Aleve, or Ibuprofen. IF YOU ARE ON BLOOD THINNERS (PLAVIX, COUMADIN, ELIQUIS ETC) THESE WILL ALSO NEED TO BE HELD. Any illegal drugs in your system (including Marijuana even if legally prescribed) will result in your surgery being cancelled. Please be sure to check with our office or the hospital on time frame for the drugs to be out of your system. SHOULD YOUR INSURANCE CHANGE AT ANY TIME YOU MUST CONTACT OUR OFFICE. FAILURE TO DO SO MAY RESULT IN YOUR SURGERY BEING RESCHEDULED OR YOU MAY BE CHARGED AS SELF-PAY. Due to the high demand for surgery at our practice, if you cancel or reschedule your surgery two (2) times we may not reschedule you. If you need FMLA or Short Term Disability paperwork completed for your surgery, please complete your portion, ensure your name and date of birth are on them and fax them to [phone] asap. Paperwork can take up to 2 weeks to be completed. If you have any questions or concerns regarding your surgery, please contact the [facility] [phone]. If you need medical clearance, you are responsible to contact your physician(s) to schedule an appointment for clearance. If clearance is not completed within 30 days of your surgery it may be cancelled. Our office will fax the appropriate forms that need to be completed to your physician(s).     ** ALL TONSILLECTOMY PATIENTS**-- IF YOU EXPERIENCE A POST

## 2023-11-27 ENCOUNTER — TELEPHONE (OUTPATIENT)
Dept: ENT CLINIC | Age: 38
End: 2023-11-27

## 2023-11-27 ENCOUNTER — PREP FOR PROCEDURE (OUTPATIENT)
Dept: ENT CLINIC | Age: 38
End: 2023-11-27

## 2023-11-27 PROBLEM — H69.90 ETD (EUSTACHIAN TUBE DYSFUNCTION): Status: ACTIVE | Noted: 2023-11-27

## 2023-11-27 NOTE — TELEPHONE ENCOUNTER
Prior Authorization Form:      DEMOGRAPHICS:                     Patient Name:  Jonathan Cardona  Patient :  1985            Insurance:  Payor: Mika Lux / Plan: [address] [place] / Product Type: *No Product type* /   Insurance ID Number:    Payer/Plan Subscr  Sex Relation Sub.  Ins. ID Effective Group Num   1. [address] CLA[address] Billy A 1985 Male Self [id number]                                   [address]         DIAGNOSIS & PROCEDURE:                       Procedure/Operation: LEFT EUSTACHIAN TUBE BALLOON DILATION; LEFT MYRINGOTOMY WITH T TUBE           CPT Code: 74535 [id number]     Diagnosis:  EUSTACHIAN TUBE DYSFUNCTION    ICD10 Code: H69.93    Location:  [facility]    Surgeon:  Yamil Moraes INFORMATION:                          Date: 24    Time: N/A              Anesthesia:  General                                                       Status:  Outpatient        Special Comments:  T TUBE       Electronically signed by Negrita Porter MA on 2023 at 1:42 PM

## 2023-12-05 ENCOUNTER — HOSPITAL ENCOUNTER (EMERGENCY)
Age: 38
Discharge: HOME | End: 2023-12-05
Attending: EMERGENCY MEDICINE
Payer: COMMERCIAL

## 2023-12-05 VITALS
RESPIRATION RATE: 16 BRPM | SYSTOLIC BLOOD PRESSURE: 130 MMHG | HEART RATE: 90 BPM | OXYGEN SATURATION: 98 % | DIASTOLIC BLOOD PRESSURE: 90 MMHG | TEMPERATURE: 98.7 F

## 2023-12-05 DIAGNOSIS — J06.9 ACUTE UPPER RESPIRATORY INFECTION: Primary | ICD-10-CM

## 2023-12-05 LAB
SARS-COV-2 RDRP RESP QL NAA+PROBE: NOT DETECTED
SPECIMEN DESCRIPTION: NORMAL

## 2023-12-05 PROCEDURE — 87635 SARS-COV-2 COVID-19 AMP PRB: CPT

## 2023-12-05 PROCEDURE — 99283 EMERGENCY DEPT VISIT LOW MDM: CPT

## 2023-12-05 RX ORDER — BROMPHENIRAMINE MALEATE, PSEUDOEPHEDRINE HYDROCHLORIDE, AND DEXTROMETHORPHAN HYDROBROMIDE 2; 30; 10 MG/5ML; MG/5ML; MG/5ML
5 SYRUP ORAL 4 TIMES DAILY PRN
Qty: 120 ML | Refills: 0 | Status: SHIPPED | OUTPATIENT
Start: 2023-12-05

## 2023-12-05 RX ORDER — AMOXICILLIN AND CLAVULANATE POTASSIUM 875; 125 MG/1; MG/1
1 TABLET, FILM COATED ORAL 2 TIMES DAILY
Qty: 20 TABLET | Refills: 0 | Status: SHIPPED | OUTPATIENT
Start: 2023-12-05 | End: 2023-12-15

## 2023-12-05 ASSESSMENT — ENCOUNTER SYMPTOMS
DIARRHEA: 0
EYE REDNESS: 0
WHEEZING: 0
NAUSEA: 0
RHINORRHEA: 1
SORE THROAT: 0
ABDOMINAL PAIN: 0
BACK PAIN: 0
SINUS PRESSURE: 0
COUGH: 1
VOMITING: 0
EYE DISCHARGE: 0
SHORTNESS OF BREATH: 0
EYE PAIN: 0

## 2023-12-05 ASSESSMENT — PAIN - FUNCTIONAL ASSESSMENT: PAIN_FUNCTIONAL_ASSESSMENT: NONE - DENIES PAIN

## 2023-12-05 NOTE — ED PROVIDER NOTES
The history is provided by the patient. URI  Presenting symptoms: congestion, cough, fatigue and rhinorrhea    Presenting symptoms: no ear pain, no fever and no sore throat    Severity:  Moderate  Onset quality:  Sudden  Duration:  1 day  Chronicity:  New  Associated symptoms: no arthralgias, no headaches and no wheezing         Review of Systems   Constitutional:  Positive for fatigue. Negative for chills and fever. HENT:  Positive for congestion and rhinorrhea. Negative for ear pain, sinus pressure and sore throat. Eyes:  Negative for pain, discharge and redness. Respiratory:  Positive for cough. Negative for shortness of breath and wheezing. Cardiovascular:  Negative for chest pain. Gastrointestinal:  Negative for abdominal pain, diarrhea, nausea and vomiting. Genitourinary:  Negative for dysuria and frequency. Musculoskeletal:  Negative for arthralgias and back pain. Skin:  Negative for rash and wound. Neurological:  Negative for weakness and headaches. Hematological:  Negative for adenopathy. All other systems reviewed and are negative. Physical Exam  Vitals and nursing note reviewed. Constitutional:       Appearance: He is well-developed. HENT:      Head: Normocephalic and atraumatic. Jaw: No trismus. Right Ear: Hearing and external ear normal. Tympanic membrane is retracted. Left Ear: Hearing and external ear normal. Tympanic membrane is retracted. Nose: Mucosal edema, congestion and rhinorrhea present. Right Sinus: No maxillary sinus tenderness or frontal sinus tenderness. Left Sinus: No maxillary sinus tenderness or frontal sinus tenderness. Mouth/Throat:      Pharynx: Uvula midline. No uvula swelling. Eyes:      General: Lids are normal.      Conjunctiva/sclera: Conjunctivae normal.      Pupils: Pupils are equal, round, and reactive to light. Cardiovascular:      Rate and Rhythm: Normal rate and regular rhythm.       Heart sounds:

## 2023-12-07 ENCOUNTER — TELEPHONE (OUTPATIENT)
Dept: ENT CLINIC | Age: 38
End: 2023-12-07

## 2023-12-07 NOTE — TELEPHONE ENCOUNTER
Called pt's girlfriend per HIPAA asked her to contact pt to have him call the office to discuss his surgery.

## 2023-12-07 NOTE — TELEPHONE ENCOUNTER
Pt called back and stated he will have new coverage starting in January. Requested pt send a [employer] message with a clear copy of the front and back of his new card asap so we can get that updated in his chart and start the auth process for his surgery and prevent a likely reschedule of his surgery. Pt stated he is currently at work but he will do so when he gets home.

## 2023-12-07 NOTE — TELEPHONE ENCOUNTER
Received the following email regarding patient's surgery \"I am working on patient Lisa Paul 1985 for his procedure on 1/2/2024 and the insurance on file [facility] Bronze/Silver plan is scheduled to term on 12/31/2023. We will not be able to verify if he has this coverage continuation until 1/2/2024 since [facility] will not provide information as to whether the patient will have the same coverage in 2024 until 1/1/2024. We will be closed on 1/1/2024 for the holiday so we will not be able to attempt an authorization until 1/2/2024 so the patient is going to have to be rescheduled in order for us to be able to obtain authorization and if the patient is going to have this same coverage in 2024 then per this insurance the patient has to have his PCP submit a referral to [facility] for patient to see Dr. Lona Aldana. Please let me know if you will be rescheduling the patient.        Thank you,   [address] [facility] Specialist \"

## 2023-12-09 ENCOUNTER — HOSPITAL ENCOUNTER (EMERGENCY)
Age: 38
Discharge: HOME | End: 2023-12-09
Payer: COMMERCIAL

## 2023-12-09 ENCOUNTER — APPOINTMENT (OUTPATIENT)
Dept: GENERAL RADIOLOGY | Age: 38
End: 2023-12-09
Payer: COMMERCIAL

## 2023-12-09 VITALS
SYSTOLIC BLOOD PRESSURE: 144 MMHG | TEMPERATURE: 98 F | DIASTOLIC BLOOD PRESSURE: 77 MMHG | HEART RATE: 72 BPM | OXYGEN SATURATION: 99 %

## 2023-12-09 DIAGNOSIS — J06.9 ACUTE UPPER RESPIRATORY INFECTION: Primary | ICD-10-CM

## 2023-12-09 LAB
ALBUMIN SERPL-MCNC: 4.2 G/DL (ref 3.5–5.2)
ALP SERPL-CCNC: 112 U/L (ref 40–129)
ALT SERPL-CCNC: 104 U/L (ref 0–40)
ANION GAP SERPL CALCULATED.3IONS-SCNC: 10 MMOL/L (ref 7–16)
AST SERPL-CCNC: 42 U/L (ref 0–39)
BASOPHILS # BLD: 0.02 K/UL (ref 0–0.2)
BASOPHILS NFR BLD: 0 % (ref 0–2)
BILIRUB SERPL-MCNC: 0.2 MG/DL (ref 0–1.2)
BUN SERPL-MCNC: 16 MG/DL (ref 6–20)
CALCIUM SERPL-MCNC: 9.4 MG/DL (ref 8.6–10.2)
CHLORIDE SERPL-SCNC: 99 MMOL/L (ref 98–107)
CO2 SERPL-SCNC: 30 MMOL/L (ref 22–29)
CREAT SERPL-MCNC: 0.8 MG/DL (ref 0.7–1.2)
EOSINOPHIL # BLD: 0.17 K/UL (ref 0.05–0.5)
EOSINOPHILS RELATIVE PERCENT: 2 % (ref 0–6)
ERYTHROCYTE [DISTWIDTH] IN BLOOD BY AUTOMATED COUNT: 12.5 % (ref 11.5–15)
GFR SERPL CREATININE-BSD FRML MDRD: >60 ML/MIN/1.73M2
GLUCOSE SERPL-MCNC: 267 MG/DL (ref 74–99)
HCT VFR BLD AUTO: 45.8 % (ref 37–54)
HGB BLD-MCNC: 15.3 G/DL (ref 12.5–16.5)
IMM GRANULOCYTES # BLD AUTO: <0.03 K/UL (ref 0–0.58)
IMM GRANULOCYTES NFR BLD: 0 % (ref 0–5)
INFLUENZA A BY PCR: NOT DETECTED
INFLUENZA B BY PCR: NOT DETECTED
LYMPHOCYTES NFR BLD: 4.13 K/UL (ref 1.5–4)
LYMPHOCYTES RELATIVE PERCENT: 51 % (ref 20–42)
MCH RBC QN AUTO: 28.9 PG (ref 26–35)
MCHC RBC AUTO-ENTMCNC: 33.4 G/DL (ref 32–34.5)
MCV RBC AUTO: 86.6 FL (ref 80–99.9)
MONOCYTES NFR BLD: 0.57 K/UL (ref 0.1–0.95)
MONOCYTES NFR BLD: 7 % (ref 2–12)
NEUTROPHILS NFR BLD: 39 % (ref 43–80)
NEUTS SEG NFR BLD: 3.15 K/UL (ref 1.8–7.3)
PLATELET # BLD AUTO: 246 K/UL (ref 130–450)
PMV BLD AUTO: 9.3 FL (ref 7–12)
POTASSIUM SERPL-SCNC: 4.3 MMOL/L (ref 3.5–5)
PROT SERPL-MCNC: 7.2 G/DL (ref 6.4–8.3)
RBC # BLD AUTO: 5.29 M/UL (ref 3.8–5.8)
SARS-COV-2 RDRP RESP QL NAA+PROBE: NOT DETECTED
SODIUM SERPL-SCNC: 139 MMOL/L (ref 132–146)
SPECIMEN DESCRIPTION: NORMAL
TROPONIN I SERPL HS-MCNC: 6 NG/L (ref 0–11)
WBC OTHER # BLD: 8.1 K/UL (ref 4.5–11.5)

## 2023-12-09 PROCEDURE — 85025 COMPLETE CBC W/AUTO DIFF WBC: CPT

## 2023-12-09 PROCEDURE — 87635 SARS-COV-2 COVID-19 AMP PRB: CPT

## 2023-12-09 PROCEDURE — 93005 ELECTROCARDIOGRAM TRACING: CPT | Performed by: PHYSICIAN ASSISTANT

## 2023-12-09 PROCEDURE — 84484 ASSAY OF TROPONIN QUANT: CPT

## 2023-12-09 PROCEDURE — 71046 X-RAY EXAM CHEST 2 VIEWS: CPT

## 2023-12-09 PROCEDURE — 87502 INFLUENZA DNA AMP PROBE: CPT

## 2023-12-09 PROCEDURE — 99285 EMERGENCY DEPT VISIT HI MDM: CPT

## 2023-12-09 PROCEDURE — 80053 COMPREHEN METABOLIC PANEL: CPT

## 2023-12-09 RX ORDER — METHYLPREDNISOLONE 4 MG/1
TABLET ORAL
Qty: 1 KIT | Refills: 0 | Status: SHIPPED | OUTPATIENT
Start: 2023-12-09 | End: 2023-12-15

## 2023-12-09 RX ORDER — AZITHROMYCIN 250 MG/1
TABLET, FILM COATED ORAL
Qty: 1 PACKET | Refills: 0 | Status: SHIPPED | OUTPATIENT
Start: 2023-12-09 | End: 2023-12-13

## 2023-12-09 ASSESSMENT — LIFESTYLE VARIABLES
HOW OFTEN DO YOU HAVE A DRINK CONTAINING ALCOHOL: NEVER
HOW MANY STANDARD DRINKS CONTAINING ALCOHOL DO YOU HAVE ON A TYPICAL DAY: PATIENT DOES NOT DRINK

## 2023-12-09 NOTE — ED PROVIDER NOTES
Independent HIRAL Visit. Department of Emergency Medicine   ED  Provider Note  Admit Date/RoomTime: 12/9/2023  4:38 PM  ED Room: LUIS ALFREDO/LUIS ALFREDO            Chief Complaint:  URI (Pt states that he's has chest congestion and tightness, cough, body aches for a week, diarrhea started today. OTC medications are not working. Seen at  a few days ago given ATB and cough medication.)      History of Present Illness:  Source of history provided by:  patient. History/Exam Limitations: none. Yuly Flanagan is a 41 y.o. old male presenting to the emergency department for cough, congestion, chest tightness, which occured 5 day(s) prior to arrival.  Since onset the symptoms have been persistent. Denies chest pain, shortness of breath, difficulty swallowing, difficulty breathing, neck pain, neck stiffness, or rash. Does report sick contacts as several other family members are ill with similar symptoms. Does not  report fever, chills and body aches. Patient denies recent travel. Patient denies all other symptoms at this time. Review of Systems:      Pertinent positives and negatives are stated within HPI, all other systems reviewed and are negative. Past Medical History:  has a past medical history of Allergic rhinitis, Chronic back pain, Depression, and Diabetes mellitus ([address] Past Surgical History:  has a past surgical history that includes Abscess Drainage. Social History:  reports that he has been smoking cigarettes. He started smoking about 26 years ago. He has a 7.50 pack-year smoking history. He has never used smokeless tobacco. He reports that he does not currently use alcohol. He reports current drug use. Drug: Marijuana Marizol Sella). Family History: family history is not on file. Allergies: Patient has no known allergies. Physical Exam:  Vital signs reviewed. Constitutional:  Alert, development consistent with age. Well appearing and non toxic and not distressed.   Ears:  TMs without perforation,

## 2023-12-10 LAB
EKG ATRIAL RATE: 67 BPM
EKG P AXIS: 43 DEGREES
EKG P-R INTERVAL: 168 MS
EKG Q-T INTERVAL: 388 MS
EKG QRS DURATION: 86 MS
EKG QTC CALCULATION (BAZETT): 409 MS
EKG R AXIS: 69 DEGREES
EKG T AXIS: 44 DEGREES
EKG VENTRICULAR RATE: 67 BPM

## 2023-12-10 PROCEDURE — 93010 ELECTROCARDIOGRAM REPORT: CPT | Performed by: INTERNAL MEDICINE

## 2023-12-14 NOTE — TELEPHONE ENCOUNTER
Called and notified pt per [facility] specialist sx is good to go as scheduled.  Pt expressed understanding

## 2023-12-21 RX ORDER — INSULIN GLARGINE 100 [IU]/ML
20 INJECTION, SOLUTION SUBCUTANEOUS NIGHTLY
COMMUNITY

## 2023-12-21 RX ORDER — GABAPENTIN 800 MG/1
800 TABLET ORAL 3 TIMES DAILY
COMMUNITY
Start: 2023-11-25

## 2023-12-21 NOTE — PROGRESS NOTES
[facility]   PRE-ADMISSION TESTING GENERAL INSTRUCTIONS  PAT Phone Number: [phone]      GENERAL INSTRUCTIONS:    [x] Antibacterial Soap Shower Night before and/or AM of surgery.  [x] Do not wear lotions, powders, deodorant.   [x] Nothing by mouth after midnight; including gum, candy, mints, or water.  [x] You may brush your teeth, gargle, but do NOT swallow water.   [x] No tobacco products, illegal drugs, or alcohol within 24 hours of your surgery.  [x] Jewelry or valuables should not be brought to the hospital. All body and/or tongue piercing's must be removed prior to arriving to hospital. No contact lens or hair pins.   [x] Arrange transportation with a responsible adult  to and from the hospital. Arrange for someone to be with you for the remainder of the day and for 24 hours after your procedure due to having had anesthesia.          -Who will be your  for transportation? Anila- Girlfriend        -Who will be staying with you for 24 hrs after your procedure? Anila- Girlfriend  [x] Bring insurance card and photo ID.     PARKING INSTRUCTIONS:     [x] ARRIVAL DATE & TIME: 1/2/23 @ 12:30PM  [x] Times are subject to change. We will contact you the business day before surgery if that were to occur.  [x] Enter into the [facility] Entrance. Two people may accompany you. Masks are not required.  [x] Parking Lot \"I\" is where you will park. It is located on the corner of [address] and [facility] [address] [facility]. The entrance is on [address].   Only one vehicle - per patient, is permitted in parking lot.   Upon entering the parking lot, a voucher ticket will print.    MEDICATION INSTRUCTIONS:    [x] Bring a complete list of your medications, please write the last time you took the medicine, give this list to the nurse in Pre-Op.  [x] Take only the following medications the morning of surgery with 1-2 ounces of water: clonazepam (Klonopin) and gabapentin (Neurontin).  [x]

## 2023-12-29 ENCOUNTER — TELEPHONE (OUTPATIENT)
Dept: ENT CLINIC | Age: 38
End: 2023-12-29

## 2023-12-29 ENCOUNTER — HOSPITAL ENCOUNTER (EMERGENCY)
Age: 38
Discharge: HOME | End: 2023-12-29
Attending: STUDENT IN AN ORGANIZED HEALTH CARE EDUCATION/TRAINING PROGRAM
Payer: COMMERCIAL

## 2023-12-29 ENCOUNTER — ANESTHESIA EVENT (OUTPATIENT)
Dept: OPERATING ROOM | Age: 38
End: 2023-12-29
Payer: COMMERCIAL

## 2023-12-29 VITALS
OXYGEN SATURATION: 98 % | TEMPERATURE: 98.7 F | HEART RATE: 76 BPM | SYSTOLIC BLOOD PRESSURE: 140 MMHG | DIASTOLIC BLOOD PRESSURE: 90 MMHG | RESPIRATION RATE: 16 BRPM

## 2023-12-29 DIAGNOSIS — Z02.89 ENCOUNTER TO OBTAIN EXCUSE FROM WORK: ICD-10-CM

## 2023-12-29 DIAGNOSIS — R11.2 NAUSEA AND VOMITING, UNSPECIFIED VOMITING TYPE: Primary | ICD-10-CM

## 2023-12-29 PROCEDURE — 6370000000 HC RX 637 (ALT 250 FOR IP): Performed by: STUDENT IN AN ORGANIZED HEALTH CARE EDUCATION/TRAINING PROGRAM

## 2023-12-29 PROCEDURE — 99283 EMERGENCY DEPT VISIT LOW MDM: CPT

## 2023-12-29 RX ORDER — ONDANSETRON 4 MG/1
4 TABLET, ORALLY DISINTEGRATING ORAL ONCE
Status: COMPLETED | OUTPATIENT
Start: 2023-12-29 | End: 2023-12-29

## 2023-12-29 RX ORDER — ONDANSETRON 4 MG/1
4 TABLET, ORALLY DISINTEGRATING ORAL 3 TIMES DAILY PRN
Qty: 10 TABLET | Refills: 0 | Status: SHIPPED | OUTPATIENT
Start: 2023-12-29

## 2023-12-29 RX ADMIN — ONDANSETRON 4 MG: 4 TABLET, ORALLY DISINTEGRATING ORAL at 16:50

## 2023-12-29 ASSESSMENT — PAIN - FUNCTIONAL ASSESSMENT: PAIN_FUNCTIONAL_ASSESSMENT: NONE - DENIES PAIN

## 2023-12-29 NOTE — PROGRESS NOTES
Received call from Ave at Dr Rao's office stating Dr Rao will do surgery as scheduled  with or without medical clearance.

## 2023-12-29 NOTE — DISCHARGE INSTRUCTIONS
If symptoms are persisting if you develop fevers if blood sugars become uncontrolled please go to emergency department for evaluation

## 2023-12-29 NOTE — TELEPHONE ENCOUNTER
Called office to get clearance update from [place] [address] [place] [address]  no answer and no clearance faxed to office yet. Surgery date is 1/2/23.

## 2023-12-29 NOTE — ED PROVIDER NOTES
HPI   78-year-old male patient presenting to emergency department for evaluation of nausea vomiting. States intermittent for the last 2 days. He states saw primary doctor today and was feeling bad nausea and started again. He notes abdominal soreness. He is a diabetic. He states blood glucose prior to arrival was 187. He denies any chest pain or shortness of breath. He is requesting work excuse as he did not go to work earlier today. No blood in vomit. On chart review he does have history of intractable vomiting as well as cyclic vomiting. Review of Systems   Constitutional:  Negative for chills and fever. HENT:  Negative for ear pain, sinus pressure and sore throat. Eyes:  Negative for pain, discharge and redness. Respiratory:  Negative for cough, shortness of breath and wheezing. Cardiovascular:  Negative for chest pain. Gastrointestinal:  Positive for nausea and vomiting. Negative for abdominal pain and diarrhea. Musculoskeletal:  Negative for arthralgias and back pain. Skin:  Negative for rash and wound. Neurological:  Negative for weakness and headaches. Hematological:  Negative for adenopathy. All other systems reviewed and are negative. Physical Exam  Vitals and nursing note reviewed. Constitutional:       Appearance: He is well-developed. HENT:      Head: Normocephalic and atraumatic. Mouth/Throat:      Mouth: Mucous membranes are moist.      Pharynx: Oropharynx is clear. Eyes:      Conjunctiva/sclera: Conjunctivae normal.   Cardiovascular:      Rate and Rhythm: Normal rate and regular rhythm. Heart sounds: Normal heart sounds. No murmur heard. Pulmonary:      Effort: Pulmonary effort is normal. No respiratory distress. Breath sounds: Normal breath sounds. Abdominal:      General: Bowel sounds are normal.      Palpations: Abdomen is soft. Tenderness: There is no abdominal tenderness. There is no guarding or rebound.    Musculoskeletal:      General: No tenderness or deformity.      Cervical back: Normal range of motion and neck supple.   Skin:     General: Skin is warm and dry.   Neurological:      Mental Status: He is alert and oriented to person, place, and time.      Cranial Nerves: No cranial nerve deficit.      Coordination: Coordination normal.          Procedures     MDM  Patient is well-appearing, nontoxic, no distress here for nausea and vomiting as well as encounter to obtain excuse for work.  He is diabetic blood glucose 187 earlier today.  I discussed with patient obtaining basic labs as well as IV fluids versus trial of ODT Zofran.  At this time patient would like to trial Zofran and be discharged.  I discussed with patient that if symptoms persist or worsen he must go to emergency department for evaluation, he was agreeable.           --------------------------------------------- PAST HISTORY ---------------------------------------------  Past Medical History:  has a past medical history of Allergic rhinitis, Chronic back pain, Depression, and Diabetes mellitus (HCC).    Past Surgical History:  has a past surgical history that includes Abscess Drainage.    Social History:  reports that he has been smoking cigarettes. He started smoking about 27 years ago. He has a 13.5 pack-year smoking history. He has never used smokeless tobacco. He reports that he does not currently use alcohol. He reports current drug use. Drug: Marijuana (Weed).    Family History: family history is not on file.     The patient’s home medications have been reviewed.    Allergies: Patient has no known allergies.    -------------------------------------------------- RESULTS -------------------------------------------------  Labs:  No results found for this visit on 12/29/23.    Radiology:  No orders to display       ------------------------- NURSING NOTES AND VITALS REVIEWED ---------------------------  Date / Time Roomed:  12/29/2023  4:12 PM  ED Bed Assignment:

## 2023-12-29 NOTE — TELEPHONE ENCOUNTER
Pre admission testing called and stated pt is getting clearance today through primary care office. Asked to keep eye on due to pt surgery is on 1/2/24. [phone] Kalia Waters pre admission). Primary care for pt. [phone].

## 2023-12-29 NOTE — PROGRESS NOTES
Patient scheduled for surgery on 1/2 patient needs medical clearance left number pre admission testing for PCP, Elle Chang APRN-CNP to return the call.    Spoke with Ave at Dr. Rao's office regarding medical clearance, appears patient scheduled today with PCP.  She states they have not received the medical clearance at this time.  She will follow up with the PCP and will call back with an update.      2 pm Spoke with Ave at Dr. Rao's office she states she has not been able to reach PCP's office regarding if patient cleared medically.

## 2023-12-29 NOTE — PROGRESS NOTES
Spoke with patient he states he went to his PCP's office today for medical clearance he states they pete blood work and that the office is aware the are to fax if is medically cleared.  Discussed with patient have not received medical clearance at this time and Dr. Duval office has not been able to speak with anyone at the PCP's office regarding if patient is medically cleared.   Patient verbalized understanding.

## 2023-12-29 NOTE — TELEPHONE ENCOUNTER
Called pt and he states he went and received his clearance this morning at 8am. Pt states we should have received fax by now. I advised pt that I will fax over request for clearance so he will be set for Tuesday. Pt understood. Sending Dr. Lona Aldana a message in perfect serve as well.

## 2023-12-29 NOTE — TELEPHONE ENCOUNTER
Called pre admissions and LM for [facility],  per Dr. RIVERA that he would like to resume surgery for pt. Clearance or not. Pt can be called and advised on when to arrive for surgery.

## 2024-01-02 ENCOUNTER — HOSPITAL ENCOUNTER (OUTPATIENT)
Age: 39
Setting detail: OUTPATIENT SURGERY
Discharge: HOME | End: 2024-01-02
Attending: OTOLARYNGOLOGY | Admitting: OTOLARYNGOLOGY
Payer: COMMERCIAL

## 2024-01-02 ENCOUNTER — ANESTHESIA (OUTPATIENT)
Dept: OPERATING ROOM | Age: 39
End: 2024-01-02
Payer: COMMERCIAL

## 2024-01-02 VITALS
OXYGEN SATURATION: 97 % | DIASTOLIC BLOOD PRESSURE: 99 MMHG | TEMPERATURE: 98.5 F | SYSTOLIC BLOOD PRESSURE: 150 MMHG | WEIGHT: 304 LBS | BODY MASS INDEX: 42.56 KG/M2 | HEIGHT: 71 IN | RESPIRATION RATE: 18 BRPM | HEART RATE: 69 BPM

## 2024-01-02 DIAGNOSIS — Z96.22 S/P MYRINGOTOMY WITH INSERTION OF TUBE: ICD-10-CM

## 2024-01-02 DIAGNOSIS — Z01.812 PRE-OPERATIVE LABORATORY EXAMINATION: Primary | ICD-10-CM

## 2024-01-02 LAB
ANION GAP SERPL CALCULATED.3IONS-SCNC: 9 MMOL/L (ref 7–16)
BUN SERPL-MCNC: 14 MG/DL (ref 6–20)
CALCIUM SERPL-MCNC: 9.3 MG/DL (ref 8.6–10.2)
CHLORIDE SERPL-SCNC: 100 MMOL/L (ref 98–107)
CO2 SERPL-SCNC: 26 MMOL/L (ref 22–29)
CREAT SERPL-MCNC: 0.8 MG/DL (ref 0.7–1.2)
ERYTHROCYTE [DISTWIDTH] IN BLOOD BY AUTOMATED COUNT: 12.6 % (ref 11.5–15)
GFR SERPL CREATININE-BSD FRML MDRD: >60 ML/MIN/1.73M2
GLUCOSE BLD-MCNC: 169 MG/DL (ref 74–99)
GLUCOSE SERPL-MCNC: 158 MG/DL (ref 74–99)
HCT VFR BLD AUTO: 49.5 % (ref 37–54)
HGB BLD-MCNC: 16.7 G/DL (ref 12.5–16.5)
MCH RBC QN AUTO: 28.9 PG (ref 26–35)
MCHC RBC AUTO-ENTMCNC: 33.7 G/DL (ref 32–34.5)
MCV RBC AUTO: 85.8 FL (ref 80–99.9)
PLATELET # BLD AUTO: 257 K/UL (ref 130–450)
PMV BLD AUTO: 8.8 FL (ref 7–12)
POTASSIUM SERPL-SCNC: 4.6 MMOL/L (ref 3.5–5)
RBC # BLD AUTO: 5.77 M/UL (ref 3.8–5.8)
SODIUM SERPL-SCNC: 135 MMOL/L (ref 132–146)
WBC OTHER # BLD: 8.4 K/UL (ref 4.5–11.5)

## 2024-01-02 PROCEDURE — 3700000000 HC ANESTHESIA ATTENDED CARE: Performed by: OTOLARYNGOLOGY

## 2024-01-02 PROCEDURE — 3700000001 HC ADD 15 MINUTES (ANESTHESIA): Performed by: OTOLARYNGOLOGY

## 2024-01-02 PROCEDURE — 6370000000 HC RX 637 (ALT 250 FOR IP): Performed by: OTOLARYNGOLOGY

## 2024-01-02 PROCEDURE — 7100000010 HC PHASE II RECOVERY - FIRST 15 MIN: Performed by: OTOLARYNGOLOGY

## 2024-01-02 PROCEDURE — 7100000000 HC PACU RECOVERY - FIRST 15 MIN: Performed by: OTOLARYNGOLOGY

## 2024-01-02 PROCEDURE — 69436 CREATE EARDRUM OPENING: CPT | Performed by: OTOLARYNGOLOGY

## 2024-01-02 PROCEDURE — 3600000002 HC SURGERY LEVEL 2 BASE: Performed by: OTOLARYNGOLOGY

## 2024-01-02 PROCEDURE — 3600000012 HC SURGERY LEVEL 2 ADDTL 15MIN: Performed by: OTOLARYNGOLOGY

## 2024-01-02 PROCEDURE — 2500000003 HC RX 250 WO HCPCS

## 2024-01-02 PROCEDURE — 6360000002 HC RX W HCPCS

## 2024-01-02 PROCEDURE — 2580000003 HC RX 258

## 2024-01-02 PROCEDURE — 82962 GLUCOSE BLOOD TEST: CPT

## 2024-01-02 PROCEDURE — 2580000003 HC RX 258: Performed by: OTOLARYNGOLOGY

## 2024-01-02 PROCEDURE — 2709999900 HC NON-CHARGEABLE SUPPLY: Performed by: OTOLARYNGOLOGY

## 2024-01-02 PROCEDURE — 2500000003 HC RX 250 WO HCPCS: Performed by: STUDENT IN AN ORGANIZED HEALTH CARE EDUCATION/TRAINING PROGRAM

## 2024-01-02 PROCEDURE — 85027 COMPLETE CBC AUTOMATED: CPT

## 2024-01-02 PROCEDURE — 7100000011 HC PHASE II RECOVERY - ADDTL 15 MIN: Performed by: OTOLARYNGOLOGY

## 2024-01-02 PROCEDURE — C1726 CATH, BAL DIL, NON-VASCULAR: HCPCS | Performed by: OTOLARYNGOLOGY

## 2024-01-02 PROCEDURE — 69705 NPS SURG DILAT EUST TUBE UNI: CPT | Performed by: OTOLARYNGOLOGY

## 2024-01-02 PROCEDURE — 7100000001 HC PACU RECOVERY - ADDTL 15 MIN: Performed by: OTOLARYNGOLOGY

## 2024-01-02 PROCEDURE — 80048 BASIC METABOLIC PNL TOTAL CA: CPT

## 2024-01-02 RX ORDER — FENTANYL CITRATE 50 UG/ML
INJECTION, SOLUTION INTRAMUSCULAR; INTRAVENOUS PRN
Status: DISCONTINUED | OUTPATIENT
Start: 2024-01-02 | End: 2024-01-02 | Stop reason: SDUPTHER

## 2024-01-02 RX ORDER — SODIUM CHLORIDE 9 MG/ML
INJECTION, SOLUTION INTRAVENOUS CONTINUOUS PRN
Status: DISCONTINUED | OUTPATIENT
Start: 2024-01-02 | End: 2024-01-02 | Stop reason: SDUPTHER

## 2024-01-02 RX ORDER — OXYMETAZOLINE HYDROCHLORIDE 0.05 G/100ML
2 SPRAY NASAL ONCE
Status: COMPLETED | OUTPATIENT
Start: 2024-01-02 | End: 2024-01-02

## 2024-01-02 RX ORDER — CIPROFLOXACIN AND DEXAMETHASONE 3; 1 MG/ML; MG/ML
4 SUSPENSION/ DROPS AURICULAR (OTIC) 2 TIMES DAILY
Qty: 1 EACH | Refills: 0 | Status: SHIPPED | OUTPATIENT
Start: 2024-01-02 | End: 2024-01-05

## 2024-01-02 RX ORDER — SODIUM CHLORIDE 9 MG/ML
INJECTION, SOLUTION INTRAVENOUS PRN
Status: DISCONTINUED | OUTPATIENT
Start: 2024-01-02 | End: 2024-01-02 | Stop reason: HOSPADM

## 2024-01-02 RX ORDER — HYDROMORPHONE HYDROCHLORIDE 1 MG/ML
0.25 INJECTION, SOLUTION INTRAMUSCULAR; INTRAVENOUS; SUBCUTANEOUS EVERY 5 MIN PRN
Status: DISCONTINUED | OUTPATIENT
Start: 2024-01-02 | End: 2024-01-02 | Stop reason: HOSPADM

## 2024-01-02 RX ORDER — ONDANSETRON 2 MG/ML
INJECTION INTRAMUSCULAR; INTRAVENOUS PRN
Status: DISCONTINUED | OUTPATIENT
Start: 2024-01-02 | End: 2024-01-02 | Stop reason: SDUPTHER

## 2024-01-02 RX ORDER — TRAMADOL HYDROCHLORIDE 50 MG/1
50 TABLET ORAL
Status: COMPLETED | OUTPATIENT
Start: 2024-01-02 | End: 2024-01-02

## 2024-01-02 RX ORDER — DEXAMETHASONE SODIUM PHOSPHATE 10 MG/ML
INJECTION INTRAMUSCULAR; INTRAVENOUS PRN
Status: DISCONTINUED | OUTPATIENT
Start: 2024-01-02 | End: 2024-01-02 | Stop reason: SDUPTHER

## 2024-01-02 RX ORDER — TRAMADOL HYDROCHLORIDE 50 MG/1
50 TABLET ORAL EVERY 6 HOURS PRN
Qty: 8 TABLET | Refills: 0 | Status: SHIPPED | OUTPATIENT
Start: 2024-01-02 | End: 2024-01-05

## 2024-01-02 RX ORDER — HYDROMORPHONE HYDROCHLORIDE 1 MG/ML
0.5 INJECTION, SOLUTION INTRAMUSCULAR; INTRAVENOUS; SUBCUTANEOUS EVERY 5 MIN PRN
Status: COMPLETED | OUTPATIENT
Start: 2024-01-02 | End: 2024-01-02

## 2024-01-02 RX ORDER — SUCCINYLCHOLINE/SOD CL,ISO/PF 200MG/10ML
SYRINGE (ML) INTRAVENOUS PRN
Status: DISCONTINUED | OUTPATIENT
Start: 2024-01-02 | End: 2024-01-02 | Stop reason: SDUPTHER

## 2024-01-02 RX ORDER — PROPOFOL 10 MG/ML
INJECTION, EMULSION INTRAVENOUS PRN
Status: DISCONTINUED | OUTPATIENT
Start: 2024-01-02 | End: 2024-01-02 | Stop reason: SDUPTHER

## 2024-01-02 RX ORDER — SODIUM CHLORIDE 0.9 % (FLUSH) 0.9 %
5-40 SYRINGE (ML) INJECTION EVERY 12 HOURS SCHEDULED
Status: DISCONTINUED | OUTPATIENT
Start: 2024-01-02 | End: 2024-01-02 | Stop reason: HOSPADM

## 2024-01-02 RX ORDER — CIPROFLOXACIN HYDROCHLORIDE 3.5 MG/ML
SOLUTION/ DROPS TOPICAL PRN
Status: DISCONTINUED | OUTPATIENT
Start: 2024-01-02 | End: 2024-01-02 | Stop reason: ALTCHOICE

## 2024-01-02 RX ORDER — LIDOCAINE HYDROCHLORIDE 20 MG/ML
INJECTION, SOLUTION INTRAVENOUS PRN
Status: DISCONTINUED | OUTPATIENT
Start: 2024-01-02 | End: 2024-01-02 | Stop reason: SDUPTHER

## 2024-01-02 RX ORDER — SODIUM CHLORIDE 0.9 % (FLUSH) 0.9 %
5-40 SYRINGE (ML) INJECTION PRN
Status: DISCONTINUED | OUTPATIENT
Start: 2024-01-02 | End: 2024-01-02 | Stop reason: HOSPADM

## 2024-01-02 RX ORDER — OXYMETAZOLINE HYDROCHLORIDE 0.05 G/100ML
SPRAY NASAL PRN
Status: DISCONTINUED | OUTPATIENT
Start: 2024-01-02 | End: 2024-01-02 | Stop reason: ALTCHOICE

## 2024-01-02 RX ORDER — MIDAZOLAM HYDROCHLORIDE 1 MG/ML
INJECTION INTRAMUSCULAR; INTRAVENOUS PRN
Status: DISCONTINUED | OUTPATIENT
Start: 2024-01-02 | End: 2024-01-02 | Stop reason: SDUPTHER

## 2024-01-02 RX ORDER — CIPROFLOXACIN HYDROCHLORIDE 3.5 MG/ML
1 SOLUTION/ DROPS TOPICAL
Status: DISCONTINUED | OUTPATIENT
Start: 2024-01-02 | End: 2024-01-02 | Stop reason: HOSPADM

## 2024-01-02 RX ORDER — EPINEPHRINE NASAL SOLUTION 1 MG/ML
SOLUTION NASAL PRN
Status: DISCONTINUED | OUTPATIENT
Start: 2024-01-02 | End: 2024-01-02 | Stop reason: ALTCHOICE

## 2024-01-02 RX ORDER — PROCHLORPERAZINE EDISYLATE 5 MG/ML
5 INJECTION INTRAMUSCULAR; INTRAVENOUS
Status: DISCONTINUED | OUTPATIENT
Start: 2024-01-02 | End: 2024-01-02 | Stop reason: HOSPADM

## 2024-01-02 RX ADMIN — DEXAMETHASONE SODIUM PHOSPHATE 10 MG: 10 INJECTION INTRAMUSCULAR; INTRAVENOUS at 14:37

## 2024-01-02 RX ADMIN — ONDANSETRON 4 MG: 2 INJECTION INTRAMUSCULAR; INTRAVENOUS at 14:43

## 2024-01-02 RX ADMIN — HYDROMORPHONE HYDROCHLORIDE 0.5 MG: 1 INJECTION, SOLUTION INTRAMUSCULAR; INTRAVENOUS; SUBCUTANEOUS at 16:02

## 2024-01-02 RX ADMIN — SODIUM CHLORIDE: 9 INJECTION, SOLUTION INTRAVENOUS at 12:22

## 2024-01-02 RX ADMIN — Medication 2 SPRAY: at 12:21

## 2024-01-02 RX ADMIN — FENTANYL CITRATE 100 MCG: 50 INJECTION, SOLUTION INTRAMUSCULAR; INTRAVENOUS at 14:33

## 2024-01-02 RX ADMIN — LIDOCAINE HYDROCHLORIDE 100 MG: 20 INJECTION, SOLUTION INTRAVENOUS at 14:33

## 2024-01-02 RX ADMIN — Medication 100 MG: at 14:33

## 2024-01-02 RX ADMIN — PROPOFOL 50 MG: 10 INJECTION, EMULSION INTRAVENOUS at 14:38

## 2024-01-02 RX ADMIN — SODIUM CHLORIDE: 9 INJECTION, SOLUTION INTRAVENOUS at 14:25

## 2024-01-02 RX ADMIN — HYDROMORPHONE HYDROCHLORIDE 0.5 MG: 1 INJECTION, SOLUTION INTRAMUSCULAR; INTRAVENOUS; SUBCUTANEOUS at 15:57

## 2024-01-02 RX ADMIN — HYDROMORPHONE HYDROCHLORIDE 0.5 MG: 1 INJECTION, SOLUTION INTRAMUSCULAR; INTRAVENOUS; SUBCUTANEOUS at 15:22

## 2024-01-02 RX ADMIN — MIDAZOLAM 2 MG: 1 INJECTION INTRAMUSCULAR; INTRAVENOUS at 14:28

## 2024-01-02 RX ADMIN — HYDROMORPHONE HYDROCHLORIDE 0.5 MG: 1 INJECTION, SOLUTION INTRAMUSCULAR; INTRAVENOUS; SUBCUTANEOUS at 15:27

## 2024-01-02 RX ADMIN — PROPOFOL 250 MG: 10 INJECTION, EMULSION INTRAVENOUS at 14:33

## 2024-01-02 RX ADMIN — TRAMADOL HYDROCHLORIDE 50 MG: 50 TABLET ORAL at 16:23

## 2024-01-02 RX ADMIN — HYDROMORPHONE HYDROCHLORIDE 0.5 MG: 1 INJECTION, SOLUTION INTRAMUSCULAR; INTRAVENOUS; SUBCUTANEOUS at 15:45

## 2024-01-02 RX ADMIN — HYDROMORPHONE HYDROCHLORIDE 0.5 MG: 1 INJECTION, SOLUTION INTRAMUSCULAR; INTRAVENOUS; SUBCUTANEOUS at 15:35

## 2024-01-02 ASSESSMENT — PAIN DESCRIPTION - ORIENTATION
ORIENTATION: LEFT
ORIENTATION: LEFT

## 2024-01-02 ASSESSMENT — COPD QUESTIONNAIRES: CAT_SEVERITY: MODERATE

## 2024-01-02 ASSESSMENT — PAIN DESCRIPTION - LOCATION
LOCATION: EAR
LOCATION: EAR

## 2024-01-02 ASSESSMENT — PAIN DESCRIPTION - DESCRIPTORS
DESCRIPTORS: BURNING;ACHING;CRAMPING
DESCRIPTORS: ACHING;BURNING;CRAMPING
DESCRIPTORS: ACHING;BURNING;CRAMPING
DESCRIPTORS: BURNING;ACHING;CRAMPING

## 2024-01-02 ASSESSMENT — PAIN DESCRIPTION - PAIN TYPE: TYPE: SURGICAL PAIN

## 2024-01-02 ASSESSMENT — PAIN - FUNCTIONAL ASSESSMENT
PAIN_FUNCTIONAL_ASSESSMENT: 0-10
PAIN_FUNCTIONAL_ASSESSMENT: NONE - DENIES PAIN
PAIN_FUNCTIONAL_ASSESSMENT: 0-10

## 2024-01-02 ASSESSMENT — PAIN SCALES - GENERAL
PAINLEVEL_OUTOF10: 3
PAINLEVEL_OUTOF10: 6
PAINLEVEL_OUTOF10: 10

## 2024-01-02 ASSESSMENT — LIFESTYLE VARIABLES: SMOKING_STATUS: 1

## 2024-01-02 NOTE — PROGRESS NOTES
1555-dr zaldivar aware of patient having 2 mg of dilaudid in pacu for left ear pain with no improvement, 10 out of 10 pain, no new orders.

## 2024-01-02 NOTE — H&P
[facility]  SINCERE GoldsmithO. Ms.Ed.  New Consult         Patient Name:  Bob Concepcion  :  1985      CHIEF C/O:         Chief Complaint   Patient presents with    Follow-up       6 wk ETD w/TYMP         HISTORY OBTAINED FROM:  patient     HISTORY OF PRESENT ILLNESS:       Bob is a 38 y.o. year old male, here today for:       Here for ED follow up for sinus issues and fluid in the ears. Mainly his left ear. He states that there is pressure and decreased hearing out of that ear. Was placed on abx and steroids and Flonase, which he states did not help. Patient is a current daily smoker smoking 1/2 pack per day. Patient has a know history of ear issues. Hx of ear tubes last tubes about 10 yerars ago. He also has nasal congestion on the left side               Past Medical History:   Diagnosis Date    Allergic rhinitis      Chronic back pain      Depression      Diabetes mellitus (HCC)              Past Surgical History:   Procedure Laterality Date    ABSCESS DRAINAGE             Current Outpatient Medications:     ondansetron (ZOFRAN-ODT) 4 MG disintegrating tablet, Take 1 tablet by mouth 3 times daily as needed for Nausea or Vomiting, Disp: 5 tablet, Rfl: 0    promethazine-dextromethorphan (PROMETHAZINE-DM) 6.25-15 MG/5ML syrup, Take 5 mLs by mouth 4 times daily as needed for Cough, Disp: 118 mL, Rfl: 0    fluticasone (FLONASE) 50 MCG/ACT nasal spray, 2 sprays by Nasal route daily 2 sprays each nostril once daily, Disp: 1 each, Rfl: 3    fluticasone (FLONASE) 50 MCG/ACT nasal spray, 2 sprays by Each Nostril route daily, Disp: 16 g, Rfl: 0    ondansetron (ZOFRAN) 4 MG tablet, Take 1 tablet by mouth every 4 hours, Disp: 5 tablet, Rfl: 0    insulin lispro (HUMALOG) 100 UNIT/ML SOLN injection vial, Inject 0-8 Units into the skin 3 times daily (with meals), Disp: 3 each, Rfl: 1    albuterol sulfate HFA (VENTOLIN HFA) 108 (90 Base) MCG/ACT inhaler, Inhale 2 puffs into the lungs 4 times daily as

## 2024-01-02 NOTE — H&P
H&P reviewed, no changes. No issues. Questions and concerns were answered to the patient's satisfaction. Will proceed with procedure    Will discuss with attending     Electronically signed by Conor Andujar DO on 1/2/24 at 11:49 AM EST

## 2024-01-02 NOTE — PROGRESS NOTES
Girlfriend bedside with patient. Discharge instructions verbalized to patient with understanding

## 2024-01-02 NOTE — DISCHARGE INSTRUCTIONS
Place 4 drops in both ears two times a day for 3 days     Pt may go into water without using plugs.  If patient is diving below 6 ft then plugs should be used due to water pressure through the tubes.    Infection occurs when you see crusting or fluid coming out of the ear canal.   If you see ear drainage, start the prescribed ear drops 4 drops 2 times a day.     After 3-4 days if the drainage continues and is not slowing down, bring patient to office for evaluation.      If the drainage is improving after 3-4 days, then continue drops for 7 days.    Return to office as scheduled for tube evaluation every 3-4 months.    Alternate between tylenol and motrin for pain control     Resume home medications as prescribed by physician

## 2024-01-02 NOTE — ANESTHESIA PRE PROCEDURE
Tobacco abuse Z72.0    Midline low back pain without sciatica M54.50    Encounter for long-term (current) use of medications Z79.899    Chronic fatigue R53.82    Anxiety F41.9    Acute nasopharyngitis J00    Contact dermatitis and eczema due to detergents L24.0    Peptic disease K30    Biliary colic K80.50    Abdominal pain R10.9    Gastroesophageal reflux disease without esophagitis K21.9    Essential hypertension I10    Class 3 severe obesity due to excess calories with body mass index (BMI) of 45.0 to 49.9 in adult ([facility]) E66.01, Z68.42    Prediabetes R73.03    Nausea and vomiting R11.2    COVID-19 U07.1    Intractable nausea and vomiting R11.2    Intractable cyclical vomiting with nausea R11.15    Gastroparesis due to DM ([facility]) E11.43, K31.84    ETD (eustachian tube dysfunction) H69.90       Past Medical History:        Diagnosis Date    Allergic rhinitis     Chronic back pain     Depression     Diabetes mellitus ([facility])        Past Surgical History:        Procedure Laterality Date    ABSCESS DRAINAGE         Social History:    Social History     Tobacco Use    Smoking status: Every Day     Current packs/day: 0.50     Average packs/day: 0.5 packs/day for 27.0 years (13.5 ttl pk-yrs)     Types: Cigarettes     Start date: 1997    Smokeless tobacco: Never   Substance Use Topics    Alcohol use: Not Currently     Comment: occ                                Ready to quit: Not Answered  Counseling given: Not Answered      Vital Signs (Current):   Vitals:    12/21/23 0743 01/02/24 1203   BP:  132/88   Pulse:  83   Resp:  20   Temp:  97.3 °F (36.3 °C)   TempSrc:  Temporal   SpO2:  94%   Weight: (!) 140.6 kg (310 lb) (!) 137.9 kg (304 lb)   Height: 1.803 m (5' 11\") 1.803 m (5' 11\")                                              BP Readings from Last 3 Encounters:   01/02/24 132/88   12/29/23 (!) 140/90   12/09/23 (!) 144/77       NPO Status: Time of last liquid consumption: 2200                        Time of last solid

## 2024-01-02 NOTE — ANESTHESIA POSTPROCEDURE EVALUATION
Department of Anesthesiology  Postprocedure Note    Patient: Bob Concepcion  MRN: [id number]  YOB: 1985  Date of evaluation: 1/2/2024    Procedure Summary       Date: 01/02/24 Room / Location: [address]    Anesthesia Start: 1425 Anesthesia Stop: 1516    Procedures:       MYRINGOTOMY TUBE INSERTION, T-TUBE (Left: Ear)      EUSTACHIAN BALLOON TUBOPLASTY NASAL INSERTION (Left: Nose) Diagnosis:       ETD (eustachian tube dysfunction)      (ETD (eustachian tube dysfunction) [H69.90])    Surgeons: Des Rao DO Responsible Provider: Palmira Alvarez MD    Anesthesia Type: general ASA Status: 3            Anesthesia Type: No value filed.    Nazario Phase I: Nazario Score: 10    Nazario Phase II:      Anesthesia Post Evaluation    Patient location during evaluation: PACU  Patient participation: complete - patient participated  Level of consciousness: awake  Airway patency: patent  Nausea & Vomiting: no nausea and no vomiting  Cardiovascular status: hemodynamically stable  Respiratory status: acceptable  Hydration status: euvolemic  Pain management: adequate    No notable events documented.

## 2024-01-02 NOTE — PROGRESS NOTES
Patient admitted to pacu, placed on all appropriate monitors, cart locked and in low position,siderails up.

## 2024-01-02 NOTE — OP NOTE
Operative Note      Bob Concepcion  [id number]  1985  1/2/2024      PREOPERATIVE DIAGNOSES:  Chronic middle ear effusion and eustachian tube  dysfunction.     POSTOPERATIVE DIAGNOSES:  Chronic middle ear effusion and eustachian tube dysfunction.     PROCEDURE PERFORMED:  left eustachian tube balloon dilation; right myringotomy with tube placement     SURGEON:  Des Rao DO.     ASSISTANT:  Conor Andujar DO PGY5    ANESTHESIA:  General.     ESTIMATED BLOOD LOSS:  0 mL.     SPECIMENS:  Not obtained.     COMPLICATIONS:  None.     INDICATIONS FOR PROCEDURE:  The patient is a 38 y.o. male with a  history of eustachian tube and middle ear effusion requiring tympanostomy  tube placement in the left ears.   Eustachian tube balloon dilation was recommended.  The risks,benefits, and alternatives of procedure were discussed with the patient who expressed understanding and agreed to proceed.     DESCRIPTION OF PROCEDURE:  The patient was brought into the OR and placed  supine on the operative table.   Anesthesia was then obtained  without complication per the anesthesia record.  The patient was then  prepped and draped in usual fashion.  The procedure went forth under strict  aseptic techniques.     First, two afrin adrenaline-soaked pledgets were inserted into each nasal cavity.  These were allowed to work for 2 minutes and then removed.      Tube placement      Left  A microscope was brought in and a speculum was placed in the left ear and the external auditory canal was cleared of cerumen with a curette.  With the tympanic membrane visualized, there was not infection/ was mucoid effusion present.  A myringotomy knife was used to make an incision in the anterior-inferior portion of the TM.  Effusion was removed with a #7 Lee tip suction until the middle ear space was cleared.  A  T tube was place in the incision.    Left ETD  A 30-degree endoscope was inserted into the left nasal cavity.  It was  advanced with the

## 2024-01-03 ENCOUNTER — TELEPHONE (OUTPATIENT)
Dept: ENT CLINIC | Age: 39
End: 2024-01-03

## 2024-01-03 RX ORDER — CIPROFLOXACIN HYDROCHLORIDE 3.5 MG/ML
4 SOLUTION/ DROPS TOPICAL 2 TIMES DAILY
Qty: 1 EACH | Refills: 1 | Status: SHIPPED | OUTPATIENT
Start: 2024-01-03 | End: 2024-01-06

## 2024-01-03 NOTE — TELEPHONE ENCOUNTER
Spoke to [employer]olin[employer] and prior auth is being faxed to office.  Does not give alternatives or reason why needs prior auth

## 2024-04-23 ENCOUNTER — HOSPITAL ENCOUNTER (EMERGENCY)
Age: 39
Discharge: HOME | End: 2024-04-23
Attending: FAMILY MEDICINE
Payer: COMMERCIAL

## 2024-04-23 VITALS
HEART RATE: 94 BPM | RESPIRATION RATE: 16 BRPM | WEIGHT: 310 LBS | SYSTOLIC BLOOD PRESSURE: 110 MMHG | DIASTOLIC BLOOD PRESSURE: 78 MMHG | BODY MASS INDEX: 43.4 KG/M2 | TEMPERATURE: 98.6 F | HEIGHT: 71 IN | OXYGEN SATURATION: 98 %

## 2024-04-23 DIAGNOSIS — H00.15 CHALAZION OF LEFT LOWER EYELID: Primary | ICD-10-CM

## 2024-04-23 PROCEDURE — 99283 EMERGENCY DEPT VISIT LOW MDM: CPT

## 2024-04-23 RX ORDER — KETOROLAC TROMETHAMINE 5 MG/ML
SOLUTION OPHTHALMIC
Qty: 5 ML | Refills: 0 | Status: SHIPPED | OUTPATIENT
Start: 2024-04-23

## 2024-04-23 RX ORDER — TRAZODONE HYDROCHLORIDE 100 MG/1
200 TABLET ORAL NIGHTLY
COMMUNITY

## 2024-04-23 ASSESSMENT — PAIN DESCRIPTION - DESCRIPTORS: DESCRIPTORS: JABBING

## 2024-04-23 ASSESSMENT — PAIN SCALES - GENERAL: PAINLEVEL_OUTOF10: 8

## 2024-04-23 ASSESSMENT — PAIN - FUNCTIONAL ASSESSMENT: PAIN_FUNCTIONAL_ASSESSMENT: 0-10

## 2024-04-23 ASSESSMENT — PAIN DESCRIPTION - LOCATION: LOCATION: EYE

## 2024-04-23 ASSESSMENT — PAIN DESCRIPTION - ORIENTATION: ORIENTATION: LEFT

## 2024-04-23 ASSESSMENT — PAIN DESCRIPTION - FREQUENCY: FREQUENCY: CONTINUOUS

## 2024-04-23 ASSESSMENT — PAIN DESCRIPTION - PAIN TYPE: TYPE: ACUTE PAIN

## 2024-04-23 NOTE — ED PROVIDER NOTES
HPI:  4/23/24,   Time: 6:50 PM EDT         Bob Concepcion is a 39 y.o. male presenting to the ED for acute onset left eye pain that started 2 days ago, associated with a very watery secretion.  There is photosensitivity left eye.  He also complains of redness and irritation in the right eye as well.  He wears a 30-day contact lenses.  His last eye exam was about 11 months ago.  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  He denies injury to the eye.  He denies itchiness or sneezing.  He wears the contacts 24 hours daily for 30 days.  Description for the left eye is very watery and runny.  He denies any thick secretion from either eye.        ROS:   Pertinent positives and negatives are stated within HPI, all other systems reviewed and are negative.  --------------------------------------------- PAST HISTORY ---------------------------------------------  Past Medical History:  has a past medical history of Allergic rhinitis, Chronic back pain, Depression, and Diabetes mellitus (HCC).    Past Surgical History:  has a past surgical history that includes Abscess Drainage; myringotomy (Left, 1/2/2024); and Tympanostomy tube placement (Left, 1/2/2024).    Social History:  reports that he has been smoking cigarettes. He started smoking about 27 years ago. He has a 13.7 pack-year smoking history. He has never used smokeless tobacco. He reports that he does not currently use alcohol. He reports current drug use. Drug: Marijuana (Weed).    Family History: family history is not on file.     The patient’s home medications have been reviewed.    Allergies: Patient has no known allergies.    -------------------------------------------------- RESULTS -------------------------------------------------  All laboratory and radiology results have been personally reviewed by myself   LABS:  No results found for this visit on 04/23/24.    RADIOLOGY:  Interpreted by Radiologist.  No orders to display       -------------------------

## 2024-06-23 ENCOUNTER — HOSPITAL ENCOUNTER (EMERGENCY)
Age: 39
Discharge: HOME | End: 2024-06-23
Payer: COMMERCIAL

## 2024-06-23 VITALS
BODY MASS INDEX: 39.28 KG/M2 | HEIGHT: 72 IN | DIASTOLIC BLOOD PRESSURE: 76 MMHG | HEART RATE: 74 BPM | WEIGHT: 290 LBS | RESPIRATION RATE: 16 BRPM | SYSTOLIC BLOOD PRESSURE: 116 MMHG | TEMPERATURE: 98.1 F | OXYGEN SATURATION: 96 %

## 2024-06-23 DIAGNOSIS — A08.4 VIRAL GASTROENTERITIS: ICD-10-CM

## 2024-06-23 DIAGNOSIS — R11.0 NAUSEA: Primary | ICD-10-CM

## 2024-06-23 PROCEDURE — 99283 EMERGENCY DEPT VISIT LOW MDM: CPT

## 2024-06-23 RX ORDER — ONDANSETRON 4 MG/1
4 TABLET, ORALLY DISINTEGRATING ORAL 3 TIMES DAILY PRN
Qty: 21 TABLET | Refills: 0 | Status: SHIPPED | OUTPATIENT
Start: 2024-06-23

## 2024-06-23 ASSESSMENT — PAIN - FUNCTIONAL ASSESSMENT: PAIN_FUNCTIONAL_ASSESSMENT: NONE - DENIES PAIN

## 2024-06-23 NOTE — ED PROVIDER NOTES
needed for symptoms of irregular blood glucose. Dispense sufficient amount for indicated testing frequency plus additional to accommodate PRN testing needs.E11.9     brompheniramine-pseudoephedrine-DM 2-30-10 MG/5ML syrup  Take 5 mLs by mouth 4 times daily as needed for Congestion or Cough     clonazePAM 0.5 MG tablet  Commonly known as: KLONOPIN     DayVigo 5 MG Tabs  Generic drug: Lemborexant     escitalopram 20 MG tablet  Commonly known as: LEXAPRO     fluticasone 50 MCG/ACT nasal spray  Commonly known as: FLONASE  2 sprays by Each Nostril route daily     gabapentin 800 MG tablet  Commonly known as: NEURONTIN     hydrOXYzine pamoate 50 MG capsule  Commonly known as: VISTARIL     insulin glargine 100 UNIT/ML injection vial  Commonly known as: LANTUS     insulin lispro 100 UNIT/ML Soln injection vial  Commonly known as: HUMALOG,ADMELOG  Inject 0-8 Units into the skin 3 times daily (with meals)     ketorolac 0.5 % ophthalmic solution  Commonly known as: Acular  Apply 1 drop to both eyes 4 times daily as needed for pain and inflammation     naproxen 500 MG tablet  Commonly known as: Naprosyn  Take 1 tablet by mouth in the morning and 1 tablet before bedtime. Do all this for 10 days.     prochlorperazine 10 MG tablet  Commonly known as: COMPAZINE  Take 1 tablet by mouth every 6 hours as needed (nausea)     topiramate 25 MG tablet  Commonly known as: TOPAMAX     traZODone 100 MG tablet  Commonly known as: DESYREL               Where to Get Your Medications        These medications were sent to [facility]/pharmacy #[id number] - [place], [place] - [address] - P [phone] - F [phone] [address] [facility] [address]      Phone: [phone]   ondansetron 4 MG disintegrating tablet        Follow-up Information       Follow up With Specialties Details Why Contact Info    Elle Chang APRN - CNP Family Nurse Practitioner In 1 week  [address] [facility] [address] [phone]                 Electronically signed by

## 2024-08-02 NOTE — ED NOTES
Pt. Resting in bed, easy resp.      Swetha Carrero RN  08/16/22 [id number]
Pt. States they stepped on a nail 6 days ago with their R. great toe.      Reggie Lynn RN  08/16/22 7187
Walking boot applied to R. Foot.      Paul Adamson RN  08/16/22 7383
no

## 2024-08-04 ENCOUNTER — HOSPITAL ENCOUNTER (EMERGENCY)
Age: 39
Discharge: HOME | End: 2024-08-04
Attending: FAMILY MEDICINE
Payer: COMMERCIAL

## 2024-08-04 VITALS
HEIGHT: 72 IN | BODY MASS INDEX: 39.28 KG/M2 | OXYGEN SATURATION: 99 % | TEMPERATURE: 98.1 F | DIASTOLIC BLOOD PRESSURE: 86 MMHG | RESPIRATION RATE: 18 BRPM | HEART RATE: 69 BPM | SYSTOLIC BLOOD PRESSURE: 125 MMHG | WEIGHT: 290 LBS

## 2024-08-04 DIAGNOSIS — R11.2 NAUSEA VOMITING AND DIARRHEA: Primary | ICD-10-CM

## 2024-08-04 DIAGNOSIS — R19.7 NAUSEA VOMITING AND DIARRHEA: Primary | ICD-10-CM

## 2024-08-04 PROCEDURE — 99283 EMERGENCY DEPT VISIT LOW MDM: CPT

## 2024-08-04 RX ORDER — OMEPRAZOLE 20 MG/1
20 CAPSULE, DELAYED RELEASE ORAL
Qty: 30 CAPSULE | Refills: 0 | Status: SHIPPED | OUTPATIENT
Start: 2024-08-04

## 2024-08-04 RX ORDER — ONDANSETRON 4 MG/1
4 TABLET, ORALLY DISINTEGRATING ORAL 3 TIMES DAILY PRN
Qty: 21 TABLET | Refills: 0 | Status: SHIPPED | OUTPATIENT
Start: 2024-08-04

## 2024-08-04 ASSESSMENT — PAIN - FUNCTIONAL ASSESSMENT: PAIN_FUNCTIONAL_ASSESSMENT: NONE - DENIES PAIN

## 2024-08-04 NOTE — ED PROVIDER NOTES
at bedtime    FLUTICASONE (FLONASE) 50 MCG/ACT NASAL SPRAY    2 sprays by Each Nostril route daily    GABAPENTIN (NEURONTIN) 800 MG TABLET    Take 1 tablet by mouth 3 times daily.    HYDROXYZINE (VISTARIL) 50 MG CAPSULE    TAKE 1 CAPSULE BY MOUTH TWICE DAILY AS NEEDED    INSULIN GLARGINE (LANTUS) 100 UNIT/ML INJECTION VIAL    Inject 20 Units into the skin nightly    INSULIN LISPRO (HUMALOG) 100 UNIT/ML SOLN INJECTION VIAL    Inject 0-8 Units into the skin 3 times daily (with meals)    INSULIN PEN NEEDLE (PEN NEEDLES) 32G X 5 MM MISC    1 each by Does not apply route daily    KETOROLAC (ACULAR) 0.5 % OPHTHALMIC SOLUTION    Apply 1 drop to both eyes 4 times daily as needed for pain and inflammation    LANCETS (ONETOUCH DELICA PLUS [id number]) MISC    USE TO TEST FOUR TIMES DAILY AS DIRECTED    NEEDLES & SYRINGES MISC    3 each by Does not apply route daily Use one 3 times a day with sliding scale of insulin    SHARPS CONTAINER    1 each by Does not apply route as needed (needles)    TOPIRAMATE (TOPAMAX) 25 MG TABLET    TAKE 2 TABLETS BY MOUTH AT BEDTIME    TRAZODONE (DESYREL) 100 MG TABLET    Take 2 tablets by mouth nightly     No Known Allergies     Physical Exam       ED Triage Vitals [08/04/24 1150]   BP Temp Temp Source Pulse Respirations SpO2 Height Weight - Scale   125/86 98.1 °F (36.7 °C) Infrared 69 18 99 % 1.829 m (6') 131.5 kg (290 lb)     Physical Exam   GENERAL APPEARANCE: Awake and alert. Cooperative. No acute distress.   HEAD: Normocephalic. Atraumatic.   EYES: Sclera anicteric.   ENT: Tolerates saliva. No trismus.   NECK: Supple. Trachea midline.   CARDIO: RRR. Radial pulse 2+.   LUNGS: Respirations unlabored. CTAB.  ABDOMEN: Soft. Non-distended. Non-tender.    EXTREMITIES: No acute deformities.   SKIN: Warm and dry.   NEUROLOGICAL: No gross facial drooping. Moves all 4 extremities spontaneously.   PSYCHIATRIC: Normal mood.     Diagnostics   Labs:  No results found for this visit on

## 2024-08-08 ENCOUNTER — HOSPITAL ENCOUNTER (EMERGENCY)
Age: 39
Discharge: HOME | End: 2024-08-08
Attending: FAMILY MEDICINE
Payer: COMMERCIAL

## 2024-08-08 VITALS
BODY MASS INDEX: 40.6 KG/M2 | TEMPERATURE: 97.8 F | DIASTOLIC BLOOD PRESSURE: 84 MMHG | HEART RATE: 81 BPM | SYSTOLIC BLOOD PRESSURE: 153 MMHG | HEIGHT: 71 IN | WEIGHT: 290 LBS | RESPIRATION RATE: 18 BRPM | OXYGEN SATURATION: 99 %

## 2024-08-08 DIAGNOSIS — B34.9 VIRAL INFECTION: Primary | ICD-10-CM

## 2024-08-08 LAB
SARS-COV-2 RDRP RESP QL NAA+PROBE: NOT DETECTED
SPECIMEN DESCRIPTION: NORMAL

## 2024-08-08 PROCEDURE — 87635 SARS-COV-2 COVID-19 AMP PRB: CPT

## 2024-08-08 PROCEDURE — 99284 EMERGENCY DEPT VISIT MOD MDM: CPT

## 2024-08-08 RX ORDER — BROMPHENIRAMINE MALEATE, PSEUDOEPHEDRINE HYDROCHLORIDE, AND DEXTROMETHORPHAN HYDROBROMIDE 2; 30; 10 MG/5ML; MG/5ML; MG/5ML
5 SYRUP ORAL 4 TIMES DAILY PRN
Qty: 118 ML | Refills: 0 | Status: SHIPPED | OUTPATIENT
Start: 2024-08-08

## 2024-08-08 ASSESSMENT — PAIN DESCRIPTION - DESCRIPTORS: DESCRIPTORS: ACHING

## 2024-08-08 ASSESSMENT — PAIN DESCRIPTION - FREQUENCY: FREQUENCY: INTERMITTENT

## 2024-08-08 ASSESSMENT — PAIN SCALES - GENERAL: PAINLEVEL_OUTOF10: 7

## 2024-08-08 ASSESSMENT — PAIN DESCRIPTION - LOCATION: LOCATION: CHEST

## 2024-08-08 ASSESSMENT — PAIN DESCRIPTION - PAIN TYPE: TYPE: ACUTE PAIN

## 2024-08-08 ASSESSMENT — PAIN - FUNCTIONAL ASSESSMENT
PAIN_FUNCTIONAL_ASSESSMENT: PREVENTS OR INTERFERES SOME ACTIVE ACTIVITIES AND ADLS
PAIN_FUNCTIONAL_ASSESSMENT: 0-10

## 2024-08-08 NOTE — ED PROVIDER NOTES
HPI:  8/8/24,   Time: 12:13 PM EDT         Bob Concepcion is a 39 y.o. male presenting to the ED for a 1 week history of symptoms that include some bodyaches and some chills and some fever, some nausea but no vomiting, diarrhea, now nasal congestion and some sore throat and cough.  He was exposed to COVID through a household member.  He also works in a fast food environment.        ROS:   Pertinent positives and negatives are stated within HPI, all other systems reviewed and are negative.  --------------------------------------------- PAST HISTORY ---------------------------------------------  Past Medical History:  has a past medical history of Allergic rhinitis, Chronic back pain, Depression, and Diabetes mellitus (HCC).    Past Surgical History:  has a past surgical history that includes Abscess Drainage; myringotomy (Left, 1/2/2024); and Tympanostomy tube placement (Left, 1/2/2024).    Social History:  reports that he has been smoking cigarettes. He started smoking about 27 years ago. He has a 13.8 pack-year smoking history. He has never used smokeless tobacco. He reports that he does not currently use alcohol. He reports current drug use. Drug: Marijuana (Weed).    Family History: family history is not on file.     The patient’s home medications have been reviewed.    Allergies: Patient has no known allergies.    -------------------------------------------------- RESULTS -------------------------------------------------  All laboratory and radiology results have been personally reviewed by myself   LABS:  Results for orders placed or performed during the hospital encounter of 08/08/24   COVID-19, Rapid    Specimen: Nasopharyngeal Swab   Result Value Ref Range    Specimen Description .NASOPHARYNGEAL SWAB     SARS-CoV-2, Rapid Not Detected Not Detected       RADIOLOGY:  Interpreted by Radiologist.  No orders to display       ------------------------- NURSING NOTES AND VITALS REVIEWED

## 2024-09-18 ENCOUNTER — HOSPITAL ENCOUNTER (EMERGENCY)
Age: 39
Discharge: HOME | End: 2024-09-18
Attending: EMERGENCY MEDICINE
Payer: COMMERCIAL

## 2024-09-18 VITALS
TEMPERATURE: 98.3 F | OXYGEN SATURATION: 98 % | SYSTOLIC BLOOD PRESSURE: 123 MMHG | HEART RATE: 86 BPM | DIASTOLIC BLOOD PRESSURE: 86 MMHG | RESPIRATION RATE: 16 BRPM

## 2024-09-18 DIAGNOSIS — T63.441A BEE STING REACTION, ACCIDENTAL OR UNINTENTIONAL, INITIAL ENCOUNTER: Primary | ICD-10-CM

## 2024-09-18 PROCEDURE — 99283 EMERGENCY DEPT VISIT LOW MDM: CPT

## 2024-09-18 RX ORDER — METHYLPREDNISOLONE 4 MG
TABLET, DOSE PACK ORAL
Qty: 1 KIT | Refills: 0 | Status: SHIPPED | OUTPATIENT
Start: 2024-09-18 | End: 2024-09-24

## 2024-09-18 ASSESSMENT — PAIN DESCRIPTION - ORIENTATION: ORIENTATION: LEFT

## 2024-09-18 ASSESSMENT — PAIN DESCRIPTION - LOCATION: LOCATION: LEG

## 2024-09-18 ASSESSMENT — PAIN SCALES - GENERAL: PAINLEVEL_OUTOF10: 8

## 2024-09-18 ASSESSMENT — PAIN - FUNCTIONAL ASSESSMENT: PAIN_FUNCTIONAL_ASSESSMENT: 0-10

## 2024-09-18 ASSESSMENT — PAIN DESCRIPTION - DESCRIPTORS: DESCRIPTORS: ACHING;THROBBING

## 2024-11-11 ENCOUNTER — APPOINTMENT (OUTPATIENT)
Dept: ULTRASOUND IMAGING | Age: 39
End: 2024-11-11
Payer: COMMERCIAL

## 2024-11-11 ENCOUNTER — APPOINTMENT (OUTPATIENT)
Dept: CT IMAGING | Age: 39
End: 2024-11-11
Payer: COMMERCIAL

## 2024-11-11 ENCOUNTER — HOSPITAL ENCOUNTER (EMERGENCY)
Age: 39
Discharge: HOME | End: 2024-11-11
Attending: EMERGENCY MEDICINE
Payer: COMMERCIAL

## 2024-11-11 VITALS
HEART RATE: 81 BPM | SYSTOLIC BLOOD PRESSURE: 135 MMHG | BODY MASS INDEX: 42 KG/M2 | HEIGHT: 71 IN | TEMPERATURE: 97.9 F | OXYGEN SATURATION: 95 % | WEIGHT: 300 LBS | DIASTOLIC BLOOD PRESSURE: 83 MMHG | RESPIRATION RATE: 20 BRPM

## 2024-11-11 DIAGNOSIS — R10.9 FLANK PAIN: Primary | ICD-10-CM

## 2024-11-11 LAB
ALBUMIN SERPL-MCNC: 4.3 G/DL (ref 3.5–5.2)
ALP SERPL-CCNC: 133 U/L (ref 40–129)
ALT SERPL-CCNC: 177 U/L (ref 0–40)
ANION GAP SERPL CALCULATED.3IONS-SCNC: 13 MMOL/L (ref 7–16)
AST SERPL-CCNC: 71 U/L (ref 0–39)
ATYPICAL LYMPHOCYTE ABSOLUTE COUNT: 0.37 K/UL (ref 0–0.46)
ATYPICAL LYMPHOCYTES: 3 % (ref 0–4)
BASOPHILS # BLD: 0.12 K/UL (ref 0–0.2)
BASOPHILS NFR BLD: 1 % (ref 0–2)
BILIRUB SERPL-MCNC: 0.3 MG/DL (ref 0–1.2)
BILIRUB UR QL STRIP: NEGATIVE
BUN SERPL-MCNC: 17 MG/DL (ref 6–20)
CALCIUM SERPL-MCNC: 9.4 MG/DL (ref 8.6–10.2)
CHLORIDE SERPL-SCNC: 100 MMOL/L (ref 98–107)
CLARITY UR: CLEAR
CO2 SERPL-SCNC: 24 MMOL/L (ref 22–29)
COLOR UR: YELLOW
CREAT SERPL-MCNC: 0.7 MG/DL (ref 0.7–1.2)
EOSINOPHIL # BLD: 0.62 K/UL (ref 0.05–0.5)
EOSINOPHILS RELATIVE PERCENT: 4 % (ref 0–6)
ERYTHROCYTE [DISTWIDTH] IN BLOOD BY AUTOMATED COUNT: 12.2 % (ref 11.5–15)
GFR, ESTIMATED: >90 ML/MIN/1.73M2
GLUCOSE SERPL-MCNC: 327 MG/DL (ref 74–99)
GLUCOSE UR STRIP-MCNC: >=1000 MG/DL
HCT VFR BLD AUTO: 45.1 % (ref 37–54)
HGB BLD-MCNC: 15.5 G/DL (ref 12.5–16.5)
HGB UR QL STRIP.AUTO: NEGATIVE
KETONES UR STRIP-MCNC: NEGATIVE MG/DL
LEUKOCYTE ESTERASE UR QL STRIP: NEGATIVE
LYMPHOCYTES NFR BLD: 4.36 K/UL (ref 1.5–4)
LYMPHOCYTES RELATIVE PERCENT: 31 % (ref 20–42)
MCH RBC QN AUTO: 30.3 PG (ref 26–35)
MCHC RBC AUTO-ENTMCNC: 34.4 G/DL (ref 32–34.5)
MCV RBC AUTO: 88.3 FL (ref 80–99.9)
MONOCYTES NFR BLD: 1.12 K/UL (ref 0.1–0.95)
MONOCYTES NFR BLD: 8 % (ref 2–12)
NEUTROPHILS NFR BLD: 54 % (ref 43–80)
NEUTS SEG NFR BLD: 7.6 K/UL (ref 1.8–7.3)
NITRITE UR QL STRIP: NEGATIVE
PH UR STRIP: 5.5 [PH] (ref 5–9)
PLATELET CONFIRMATION: NORMAL
PLATELET, FLUORESCENCE: 152 K/UL (ref 130–450)
PMV BLD AUTO: 10.1 FL (ref 7–12)
POTASSIUM SERPL-SCNC: 4.8 MMOL/L (ref 3.5–5)
PROT SERPL-MCNC: 7.5 G/DL (ref 6.4–8.3)
PROT UR STRIP-MCNC: NEGATIVE MG/DL
RBC # BLD AUTO: 5.11 M/UL (ref 3.8–5.8)
RBC # BLD: ABNORMAL 10*6/UL
RBC #/AREA URNS HPF: ABNORMAL /HPF
SODIUM SERPL-SCNC: 137 MMOL/L (ref 132–146)
SP GR UR STRIP: >1.03 (ref 1–1.03)
UROBILINOGEN UR STRIP-ACNC: 0.2 EU/DL (ref 0–1)
WBC #/AREA URNS HPF: ABNORMAL /HPF
WBC OTHER # BLD: 14.2 K/UL (ref 4.5–11.5)

## 2024-11-11 PROCEDURE — 99284 EMERGENCY DEPT VISIT MOD MDM: CPT

## 2024-11-11 PROCEDURE — 80053 COMPREHEN METABOLIC PANEL: CPT

## 2024-11-11 PROCEDURE — 81001 URINALYSIS AUTO W/SCOPE: CPT

## 2024-11-11 PROCEDURE — 76705 ECHO EXAM OF ABDOMEN: CPT

## 2024-11-11 PROCEDURE — 85025 COMPLETE CBC W/AUTO DIFF WBC: CPT

## 2024-11-11 PROCEDURE — 6360000002 HC RX W HCPCS

## 2024-11-11 PROCEDURE — 96374 THER/PROPH/DIAG INJ IV PUSH: CPT

## 2024-11-11 PROCEDURE — 6370000000 HC RX 637 (ALT 250 FOR IP)

## 2024-11-11 PROCEDURE — 96375 TX/PRO/DX INJ NEW DRUG ADDON: CPT

## 2024-11-11 PROCEDURE — 87086 URINE CULTURE/COLONY COUNT: CPT

## 2024-11-11 PROCEDURE — 74176 CT ABD & PELVIS W/O CONTRAST: CPT

## 2024-11-11 RX ORDER — HYDROCODONE BITARTRATE AND ACETAMINOPHEN 5; 325 MG/1; MG/1
1 TABLET ORAL ONCE
Status: COMPLETED | OUTPATIENT
Start: 2024-11-11 | End: 2024-11-11

## 2024-11-11 RX ORDER — KETOROLAC TROMETHAMINE 15 MG/ML
15 INJECTION, SOLUTION INTRAMUSCULAR; INTRAVENOUS ONCE
Status: COMPLETED | OUTPATIENT
Start: 2024-11-11 | End: 2024-11-11

## 2024-11-11 RX ORDER — ONDANSETRON 4 MG/1
4 TABLET, FILM COATED ORAL EVERY 8 HOURS PRN
Qty: 20 TABLET | Refills: 0 | Status: SHIPPED | OUTPATIENT
Start: 2024-11-11

## 2024-11-11 RX ORDER — ONDANSETRON 2 MG/ML
4 INJECTION INTRAMUSCULAR; INTRAVENOUS ONCE
Status: COMPLETED | OUTPATIENT
Start: 2024-11-11 | End: 2024-11-11

## 2024-11-11 RX ADMIN — KETOROLAC TROMETHAMINE 15 MG: 15 INJECTION, SOLUTION INTRAMUSCULAR; INTRAVENOUS at 02:37

## 2024-11-11 RX ADMIN — ONDANSETRON 4 MG: 2 INJECTION, SOLUTION INTRAMUSCULAR; INTRAVENOUS at 02:29

## 2024-11-11 RX ADMIN — HYDROCODONE BITARTRATE AND ACETAMINOPHEN 1 TABLET: 5; 325 TABLET ORAL at 04:23

## 2024-11-11 ASSESSMENT — PAIN SCALES - GENERAL
PAINLEVEL_OUTOF10: 8
PAINLEVEL_OUTOF10: 8
PAINLEVEL_OUTOF10: 9

## 2024-11-11 ASSESSMENT — PAIN DESCRIPTION - ORIENTATION
ORIENTATION: RIGHT
ORIENTATION: RIGHT
ORIENTATION: LOWER;RIGHT

## 2024-11-11 ASSESSMENT — PAIN - FUNCTIONAL ASSESSMENT
PAIN_FUNCTIONAL_ASSESSMENT: ACTIVITIES ARE NOT PREVENTED
PAIN_FUNCTIONAL_ASSESSMENT: PREVENTS OR INTERFERES SOME ACTIVE ACTIVITIES AND ADLS
PAIN_FUNCTIONAL_ASSESSMENT: ACTIVITIES ARE NOT PREVENTED
PAIN_FUNCTIONAL_ASSESSMENT: 0-10
PAIN_FUNCTIONAL_ASSESSMENT: 0-10

## 2024-11-11 ASSESSMENT — PAIN DESCRIPTION - PAIN TYPE
TYPE: ACUTE PAIN
TYPE: ACUTE PAIN

## 2024-11-11 ASSESSMENT — PAIN DESCRIPTION - DESCRIPTORS
DESCRIPTORS: SHARP;SHOOTING
DESCRIPTORS: SPASM;SHARP

## 2024-11-11 ASSESSMENT — LIFESTYLE VARIABLES
HOW MANY STANDARD DRINKS CONTAINING ALCOHOL DO YOU HAVE ON A TYPICAL DAY: PATIENT DOES NOT DRINK
HOW OFTEN DO YOU HAVE A DRINK CONTAINING ALCOHOL: NEVER

## 2024-11-11 ASSESSMENT — PAIN DESCRIPTION - FREQUENCY: FREQUENCY: CONTINUOUS

## 2024-11-11 ASSESSMENT — PAIN DESCRIPTION - LOCATION
LOCATION: FLANK
LOCATION: FLANK
LOCATION: ABDOMEN

## 2024-11-11 ASSESSMENT — PAIN DESCRIPTION - ONSET: ONSET: ON-GOING

## 2024-11-11 NOTE — DISCHARGE INSTRUCTIONS
Call or return if your flank pain does not improve or you develop uncontrollable nausea/vomiting/fevers/chills that can't be controlled by the zofran and tylenol

## 2024-11-11 NOTE — ED PROVIDER NOTES
[facility] EMERGENCY DEPARTMENT  EMERGENCY DEPARTMENT ENCOUNTER        Pt Name: Bob Concepcion  MRN: [id number]  Birthdate 1985  Date of evaluation: 11/11/2024  Provider: Keyon Griffith DO  PCP: Elle Chang APRN - CNP  Note Started: 1:51 AM EST 11/11/24    CHIEF COMPLAINT       Chief Complaint   Patient presents with    Flank Pain     Right sided flank pain that radiates to the lower back, and down the right lower extremity. Started at eleven pm tonight. Hurts when voiding.       HISTORY OF PRESENT ILLNESS: 1 or more Elements   History received from: Patient    Bob Concepcion is a 39 y.o. male who presents to the emergency department with chief complaint of abdominal pain.  Patient states this started prior to arrival in emergency department where he was having pain in the right flank region with radiation to the right side of his abdomen.  States that it is mostly in the upper region of the abdomen and does not have lower abdominal pain.  States that he had a little bit of nausea without vomiting with this.  Otherwise has no other symptoms but did mention that when he was trying to have a bowel movement that he had some tenderness in that same region.  Otherwise patient states he is not having any other symptoms and denies any fever, chills, chest pain, shortness of breath, hematuria or dysuria, constipation or diarrhea.    Nursing Notes were all reviewed and agreed with or any disagreements were addressed in the HPI.    REVIEW OF SYSTEMS :    Positives and Pertinent negatives as per HPI.    PAST MEDICAL HISTORY/Chronic Conditions Affecting Care    has a past medical history of Allergic rhinitis, Chronic back pain, Depression, and Diabetes mellitus (HCC).     SURGICAL HISTORY     Past Surgical History:   Procedure Laterality Date    ABSCESS DRAINAGE      MYRINGOTOMY Left 1/2/2024    MYRINGOTOMY TUBE INSERTION, T-TUBE performed by Des Rao DO [place] [facility]    TYMPANOSTOMY TUBE

## 2024-11-12 LAB
MICROORGANISM SPEC CULT: NO GROWTH
SERVICE CMNT-IMP: NORMAL
SPECIMEN DESCRIPTION: NORMAL

## 2024-11-17 ENCOUNTER — HOSPITAL ENCOUNTER (EMERGENCY)
Age: 39
Discharge: HOME | End: 2024-11-17
Attending: FAMILY MEDICINE
Payer: COMMERCIAL

## 2024-11-17 ENCOUNTER — APPOINTMENT (OUTPATIENT)
Dept: GENERAL RADIOLOGY | Age: 39
End: 2024-11-17
Payer: COMMERCIAL

## 2024-11-17 VITALS
RESPIRATION RATE: 16 BRPM | TEMPERATURE: 98.4 F | OXYGEN SATURATION: 98 % | SYSTOLIC BLOOD PRESSURE: 136 MMHG | DIASTOLIC BLOOD PRESSURE: 81 MMHG | HEART RATE: 74 BPM

## 2024-11-17 DIAGNOSIS — M17.11 OSTEOARTHRITIS OF RIGHT KNEE, UNSPECIFIED OSTEOARTHRITIS TYPE: ICD-10-CM

## 2024-11-17 DIAGNOSIS — S80.01XA CONTUSION OF RIGHT KNEE, INITIAL ENCOUNTER: Primary | ICD-10-CM

## 2024-11-17 PROCEDURE — 99283 EMERGENCY DEPT VISIT LOW MDM: CPT

## 2024-11-17 PROCEDURE — 73560 X-RAY EXAM OF KNEE 1 OR 2: CPT

## 2024-11-17 RX ORDER — IBUPROFEN 800 MG/1
800 TABLET, FILM COATED ORAL EVERY 8 HOURS PRN
Qty: 30 TABLET | Refills: 0 | Status: SHIPPED | OUTPATIENT
Start: 2024-11-17

## 2024-11-17 ASSESSMENT — PAIN - FUNCTIONAL ASSESSMENT
PAIN_FUNCTIONAL_ASSESSMENT: 0-10
PAIN_FUNCTIONAL_ASSESSMENT: ACTIVITIES ARE NOT PREVENTED

## 2024-11-17 ASSESSMENT — PAIN SCALES - GENERAL: PAINLEVEL_OUTOF10: 8

## 2024-11-17 ASSESSMENT — PAIN DESCRIPTION - DESCRIPTORS: DESCRIPTORS: ACHING;SHARP

## 2024-11-17 ASSESSMENT — PAIN DESCRIPTION - LOCATION: LOCATION: KNEE

## 2024-11-17 ASSESSMENT — PAIN DESCRIPTION - PAIN TYPE: TYPE: ACUTE PAIN

## 2024-11-17 ASSESSMENT — PAIN DESCRIPTION - ORIENTATION: ORIENTATION: RIGHT

## 2024-11-17 NOTE — ED PROVIDER NOTES
HPI:  11/17/24,   Time: 2:44 PM EST         Bob Concepcion is a 39 y.o. male presenting to the ED for right knee pain that started about 4 days ago, he suffered a fall, going directly down onto his knee and complains of an aching type pain of moderate intensity, worse with straightening the knee, worse with ambulation and weightbearing.      ROS:   Pertinent positives and negatives are stated within HPI, all other systems reviewed and are negative.  --------------------------------------------- PAST HISTORY ---------------------------------------------  Past Medical History:  has a past medical history of Allergic rhinitis, Chronic back pain, Depression, and Diabetes mellitus (HCC).    Past Surgical History:  has a past surgical history that includes Abscess Drainage; myringotomy (Left, 1/2/2024); and Tympanostomy tube placement (Left, 1/2/2024).    Social History:  reports that he has been smoking cigarettes. He started smoking about 27 years ago. He has a 13.9 pack-year smoking history. He has never used smokeless tobacco. He reports that he does not currently use alcohol. He reports current drug use. Drug: Marijuana (Weed).    Family History: family history is not on file.     The patient’s home medications have been reviewed.    Allergies: Patient has no known allergies.    -------------------------------------------------- RESULTS -------------------------------------------------  All laboratory and radiology results have been personally reviewed by myself   LABS:  No results found for this visit on 11/17/24.    RADIOLOGY:  Interpreted by Radiologist.  XR KNEE RIGHT (1-2 VIEWS)   Final Result   Mild patellofemoral compartment degenerative changes.      No acute abnormality of the knee.             ------------------------- NURSING NOTES AND VITALS REVIEWED ---------------------------   The nursing notes within the ED encounter and vital signs as below have been reviewed.   /81   Pulse 74   Temp 98.4 °F

## 2024-11-25 ENCOUNTER — HOSPITAL ENCOUNTER (OUTPATIENT)
Age: 39
Discharge: HOME | End: 2024-11-25
Payer: COMMERCIAL

## 2024-11-25 LAB
ALBUMIN SERPL-MCNC: 4.6 G/DL (ref 3.5–5.2)
ALP SERPL-CCNC: 161 U/L (ref 40–129)
ALT SERPL-CCNC: 160 U/L (ref 0–40)
ANION GAP SERPL CALCULATED.3IONS-SCNC: 11 MMOL/L (ref 7–16)
AST SERPL-CCNC: 52 U/L (ref 0–39)
BILIRUB SERPL-MCNC: 0.3 MG/DL (ref 0–1.2)
BUN SERPL-MCNC: 19 MG/DL (ref 6–20)
CALCIUM SERPL-MCNC: 9.9 MG/DL (ref 8.6–10.2)
CHLORIDE SERPL-SCNC: 100 MMOL/L (ref 98–107)
CO2 SERPL-SCNC: 26 MMOL/L (ref 22–29)
CREAT SERPL-MCNC: 0.7 MG/DL (ref 0.7–1.2)
ERYTHROCYTE [DISTWIDTH] IN BLOOD BY AUTOMATED COUNT: 12.3 % (ref 11.5–15)
GFR, ESTIMATED: >90 ML/MIN/1.73M2
GLUCOSE SERPL-MCNC: 412 MG/DL (ref 74–99)
HCT VFR BLD AUTO: 51.1 % (ref 37–54)
HCV GENTYP SERPL NAA+PROBE: NORMAL
HGB BLD-MCNC: 17.2 G/DL (ref 12.5–16.5)
MCH RBC QN AUTO: 29.8 PG (ref 26–35)
MCHC RBC AUTO-ENTMCNC: 33.7 G/DL (ref 32–34.5)
MCV RBC AUTO: 88.6 FL (ref 80–99.9)
PLATELET # BLD AUTO: 277 K/UL (ref 130–450)
PMV BLD AUTO: 9.1 FL (ref 7–12)
POTASSIUM SERPL-SCNC: 4.7 MMOL/L (ref 3.5–5)
PROT SERPL-MCNC: 8.1 G/DL (ref 6.4–8.3)
RBC # BLD AUTO: 5.77 M/UL (ref 3.8–5.8)
SODIUM SERPL-SCNC: 137 MMOL/L (ref 132–146)
WBC OTHER # BLD: 9.5 K/UL (ref 4.5–11.5)

## 2024-11-25 PROCEDURE — 85027 COMPLETE CBC AUTOMATED: CPT

## 2024-11-25 PROCEDURE — 87389 HIV-1 AG W/HIV-1&-2 AB AG IA: CPT

## 2024-11-25 PROCEDURE — 87902 NFCT AGT GNTYP ALYS HEP C: CPT

## 2024-11-25 PROCEDURE — 80053 COMPREHEN METABOLIC PANEL: CPT

## 2024-11-25 PROCEDURE — 36415 COLL VENOUS BLD VENIPUNCTURE: CPT

## 2024-11-26 LAB
HIV 1+2 AB+HIV1 P24 AG SERPL QL IA: NONREACTIVE
SEND OUT REPORT: NORMAL
TEST NAME: NORMAL

## 2024-11-29 LAB
SEND OUT REPORT: NORMAL
TEST NAME: NORMAL

## 2024-12-05 LAB — HCV GENTYP SERPL NAA+PROBE: NORMAL

## 2025-03-05 ENCOUNTER — TELEPHONE (OUTPATIENT)
Dept: ADMINISTRATIVE | Age: 40
End: 2025-03-05

## 2025-03-05 NOTE — TELEPHONE ENCOUNTER
Pt states pcp told him he had a hole in ear has an infection with fluid drainage. LOV 1121/23 would like to see Dr Rao.  [phone]

## 2025-03-12 ENCOUNTER — TELEPHONE (OUTPATIENT)
Dept: ENT CLINIC | Age: 40
End: 2025-03-12

## 2025-03-12 NOTE — TELEPHONE ENCOUNTER
Patient left voicemail stating his left ear is still in excruciating pain and now he cannot hear from his right ear. Would like your recommendation

## 2025-03-13 NOTE — TELEPHONE ENCOUNTER
Patient calling back to get scheduled.  States someone just called his house and he hung up on accident.  Requesting a call back.

## 2025-03-13 NOTE — TELEPHONE ENCOUNTER
Spoke with patient and advised he has an appointment on 3/19/25, no sooner appointments. Advised to be seen in the ER due to what he describes as excruciating pain.

## 2025-03-14 ENCOUNTER — HOSPITAL ENCOUNTER (EMERGENCY)
Age: 40
Discharge: HOME | End: 2025-03-14
Payer: COMMERCIAL

## 2025-03-14 VITALS
OXYGEN SATURATION: 98 % | TEMPERATURE: 98 F | DIASTOLIC BLOOD PRESSURE: 81 MMHG | SYSTOLIC BLOOD PRESSURE: 133 MMHG | RESPIRATION RATE: 18 BRPM | HEART RATE: 80 BPM

## 2025-03-14 DIAGNOSIS — H65.03 NON-RECURRENT ACUTE SEROUS OTITIS MEDIA OF BOTH EARS: Primary | ICD-10-CM

## 2025-03-14 PROCEDURE — 99283 EMERGENCY DEPT VISIT LOW MDM: CPT

## 2025-03-14 RX ORDER — FLUTICASONE PROPIONATE 50 MCG
2 SPRAY, SUSPENSION (ML) NASAL DAILY
Qty: 16 G | Refills: 0 | Status: SHIPPED | OUTPATIENT
Start: 2025-03-14

## 2025-03-14 RX ORDER — CETIRIZINE HYDROCHLORIDE, PSEUDOEPHEDRINE HYDROCHLORIDE 5; 120 MG/1; MG/1
1 TABLET, FILM COATED, EXTENDED RELEASE ORAL 2 TIMES DAILY
Qty: 60 TABLET | Refills: 0 | Status: SHIPPED | OUTPATIENT
Start: 2025-03-14 | End: 2025-04-13

## 2025-03-14 RX ORDER — METHYLPREDNISOLONE 4 MG/1
TABLET ORAL
Qty: 21 TABLET | Refills: 0 | Status: SHIPPED | OUTPATIENT
Start: 2025-03-14 | End: 2025-03-20

## 2025-03-14 NOTE — DISCHARGE INSTRUCTIONS
Medrol Dosepak as directed.  Flonase nasal spray 2 sprays each nostril daily.  Zyrtec-D.  Please try to quit smoking.  You do not have an infection in your ear.  Please follow-up with your ENT.

## 2025-03-14 NOTE — ED PROVIDER NOTES
Independent HIRAL Visit.        [facility]  Department of Emergency Medicine   ED  Encounter Note  Admit Date/RoomTime: 3/14/2025  1:06 PM  ED Room: [facility]/    NAME: Bob Concepcion  : 1985  MRN: [id number]     Chief Complaint:  Ear Pain (Left ear pain, was diagnosed with an ear infection and had a tube placed, pt states that his tube fell out of his left ear. Pt states he was started on ear drops but it has not helped. Pt states his right ear is now starting to hurt now and his hearing is muffled)    History of Present Illness        Bob Concepcion is a 39 y.o. old male who presenting to the emergency department with complaint of by private vehicle with complaints of ongoing left ear pain, patient states that he was recently diagnosed with an ear infection, is currently on Augmentin.  Patient states he is having increased pain in his left ear, has had a tube in this ear in the past, and has subsequently fallen out.  Cannot get into see ENT until April.  Also states that he has muffled hearing in his right ear.  No fevers, drainage from the ear, no mastoid pain, has worsening pain with left auricular movement.  Has not taking thing at home for pain.  He is a current every day smoker..     ROS   Pertinent positives and negatives are stated within HPI, all other systems reviewed and are negative.    Past Medical History:  has a past medical history of Allergic rhinitis, Chronic back pain, Depression, and Diabetes mellitus (HCC).    Surgical History:  has a past surgical history that includes Abscess Drainage; myringotomy (Left, 2024); and Tympanostomy tube placement (Left, 2024).    Social History:  reports that he has been smoking cigarettes. He started smoking about 28 years ago. He has a 14.1 pack-year smoking history. He has never used smokeless tobacco. He reports that he does not currently use alcohol. He reports current drug use. Drug: Marijuana (Weed).    Family History: family  history is not on file.     Allergies: Patient has no known allergies.    Physical Exam   Oxygen Saturation Interpretation: Normal.        ED Triage Vitals   BP Systolic BP Percentile Diastolic BP Percentile Temp Temp src Pulse Respirations SpO2   03/14/25 1203 -- -- 03/14/25 1146 -- 03/14/25 1146 03/14/25 1203 03/14/25 1146   133/81   98 °F (36.7 °C)  80 18 98 %      Height Weight         -- --                         Constitutional:  Alert, development consistent with age.  Ears:  External Ears: Bilateral normal.                 TM's & External Canals: Bilateral TMs are intact, no injection, moderate bilateral serous effusion, positive light reflex bilaterally.  Nose:   There is no abnormalities present  Throat:  Pharynx without injection, exudate, or tonsillar hypertrophy.  Airway patient.  Neck:  Normal ROM.  Supple.  Respiratory:  Clear to auscultation and breath sounds equal.    CV: Regular rate and rhythm, normal heart sounds, without pathological murmurs, ectopy, gallops, or rubs.  Skin:  No rashes, erythema present, unless noted elsewhere.  Lymphatic: No lymphangitis or adenopathy noted.  Neurological:  Oriented.  Motor functions intact.    Lab / Imaging Results   (All laboratory and radiology results have been personally reviewed by myself)  Labs:  No results found for this visit on 03/14/25.  Imaging:  All Radiology results interpreted by Radiologist unless otherwise noted.  No orders to display     ED Course / Medical Decision Making   Medications - No data to display       Consult(s):   None    Procedure(s):   none    MDM:   Briefly is a 39-year-old male patient is presenting with a several day history of bilateral ear pressure, left greater than right, is currently on antibiotics for an ear infection, pain is worsened.  No drainage.  Has had tubes in the past.  On exam patient is overall well-appearing, well-hydrated, his lungs are clear, he is in no respiratory distress.  His bilateral TMs are intact,

## (undated) DEVICE — BLADE SURG L3IN MYR SPEAR TIP UNPROTECTED W O HNDL S STL

## (undated) DEVICE — COTTON STRIP: Brand: DEROYAL

## (undated) DEVICE — SYSTEM BLLN DIL L16MM DIA6MM FOR EUSTACHIAN TB

## (undated) DEVICE — HEAD AND NECK: Brand: MEDLINE INDUSTRIES, INC.

## (undated) DEVICE — DRAPE,REIN 53X77,STERILE: Brand: MEDLINE

## (undated) DEVICE — GLOVE SURG 7.5 PF POLYMER WHT STRL SIGN LTX ESSENTIAL LTX

## (undated) DEVICE — DEVICE INFL PRSS G INDIC DISP (MUST BE PURC IN MULTIPLES OF 5)

## (undated) DEVICE — KIT,ANTI FOG,W/SPONGE & FLUID,SOFT PACK: Brand: MEDLINE

## (undated) DEVICE — GLOVE ORANGE PI 7   MSG9070

## (undated) DEVICE — TUBING, SUCTION, 3/16" X 12', STRAIGHT: Brand: MEDLINE

## (undated) DEVICE — CODMAN® SURGICAL PATTIES 1/2" X 3" (1.27CM X 7.62CM): Brand: CODMAN®